# Patient Record
Sex: FEMALE | Race: WHITE | NOT HISPANIC OR LATINO | Employment: FULL TIME | URBAN - METROPOLITAN AREA
[De-identification: names, ages, dates, MRNs, and addresses within clinical notes are randomized per-mention and may not be internally consistent; named-entity substitution may affect disease eponyms.]

---

## 2017-10-30 ENCOUNTER — OFFICE VISIT (OUTPATIENT)
Dept: OBSTETRICS AND GYNECOLOGY | Age: 22
End: 2017-10-30

## 2017-10-30 VITALS
HEIGHT: 63 IN | WEIGHT: 189 LBS | BODY MASS INDEX: 33.49 KG/M2 | SYSTOLIC BLOOD PRESSURE: 122 MMHG | DIASTOLIC BLOOD PRESSURE: 72 MMHG

## 2017-10-30 DIAGNOSIS — Z01.419 WELL WOMAN EXAM WITH ROUTINE GYNECOLOGICAL EXAM: Primary | ICD-10-CM

## 2017-10-30 DIAGNOSIS — D68.51 FACTOR V LEIDEN (HCC): ICD-10-CM

## 2017-10-30 PROCEDURE — 99395 PREV VISIT EST AGE 18-39: CPT | Performed by: PHYSICIAN ASSISTANT

## 2017-10-30 RX ORDER — CETIRIZINE HYDROCHLORIDE 10 MG/1
10 TABLET ORAL DAILY
COMMUNITY

## 2017-10-30 NOTE — PROGRESS NOTES
"Subjective     Chief Complaint   Patient presents with   • Gynecologic Exam     annual exam.       History of Present Illness    Ines Padron is a 22 y.o. No obstetric history on file. who presents for annual exam.      She is doing well.  Thinking about pregnancy and has questions about what to do regarding attempting pregnancy d/t her Factor V leiden deficiency. Discussed that it doesn't change how she attempts pregnancy, but would need to be treated with an anti coagulant d/t her clotting d/o.    She is a pt of Dr Mendez. Has a sister Cinthya Garcia that is a pt of Dr Garber (just delivered her 4th child)    Her menses are regular every 28-30 days, lasting 4-7 days, dysmenorrhea none   Obstetric History:  OB History     No data available         Menstrual History:     Patient's last menstrual period was 10/02/2017 (exact date).         Current contraception: condoms  History of abnormal Pap smear: no  Received Gardasil immunization: yes - x3  Perform regular self breast exam: no  Family history of uterine or ovarian cancer: no  Family History of colon cancer: no  Family history of breast cancer: no    Mammogram: not indicated.  Colonoscopy: not indicated.  DEXA: not indicated.    Exercise: moderately active  Calcium/Vitamin D: adequate intake    The following portions of the patient's history were reviewed and updated as appropriate: allergies, current medications, past family history, past medical history, past social history, past surgical history and problem list.    Review of Systems   All other systems reviewed and are negative.      Review of Systems   Constitutional: Negative for fatigue.   Respiratory: Negative for shortness of breath.    Gastrointestinal: Negative for abdominal pain.   Genitourinary: Negative for dysuria.   Neurological: Negative for headaches.   Psychiatric/Behavioral: Negative for dysphoric mood.         Objective   Physical Exam    /72  Ht 63\" (160 cm)  Wt 189 lb (85.7 kg) "  LMP 10/02/2017 (Exact Date)  Breastfeeding? No  BMI 33.48 kg/m2    General:   alert, appears stated age and cooperative   Neck: no adenopathy and thyroid normal to palpation   Heart: regular rate and rhythm   Lungs: clear to auscultation bilaterally   Abdomen: soft and nontender   Breast: inspection negative, no nipple discharge or bleeding, no masses or nodularity palpable   Vulva: normal   Vagina: normal mucosa, normal discharge   Cervix: no lesions   Uterus: normal size, non-tender   Adnexa: normal adnexa and no mass, fullness, tenderness   Rectal: not indicated     Assessment/Plan   Ines was seen today for gynecologic exam.    Diagnoses and all orders for this visit:    Well woman exam with routine gynecological exam        All questions answered.  Breast self exam technique reviewed and patient encouraged to perform self-exam monthly.  Discussed healthy lifestyle modifications.  Recommended 30 minutes of aerobic exercise five times per week.  Discussed calcium needs to prevent osteoporosis.    Disc pap guidelines, she is utd. Done last year, wnl  Disc preconception, gave samples of PNV.  Advised to call when pregnant as she would need to be on prophylactic anti coagulant  Declines STD tesing, same partner for 3 years

## 2019-01-28 ENCOUNTER — OFFICE VISIT (OUTPATIENT)
Dept: OBSTETRICS AND GYNECOLOGY | Age: 24
End: 2019-01-28

## 2019-01-28 VITALS
DIASTOLIC BLOOD PRESSURE: 68 MMHG | BODY MASS INDEX: 32.6 KG/M2 | WEIGHT: 184 LBS | HEIGHT: 63 IN | SYSTOLIC BLOOD PRESSURE: 104 MMHG

## 2019-01-28 DIAGNOSIS — Z01.419 ENCOUNTER FOR GYNECOLOGICAL EXAMINATION: Primary | ICD-10-CM

## 2019-01-28 DIAGNOSIS — Z86.718 HISTORY OF DVT (DEEP VEIN THROMBOSIS): ICD-10-CM

## 2019-01-28 DIAGNOSIS — Z31.69 ENCOUNTER FOR PRECONCEPTION CONSULTATION: ICD-10-CM

## 2019-01-28 DIAGNOSIS — Z78.9 RUBELLA IMMUNE STATUS NOT KNOWN: ICD-10-CM

## 2019-01-28 DIAGNOSIS — D68.51 FACTOR V LEIDEN (HCC): ICD-10-CM

## 2019-01-28 PROCEDURE — 99395 PREV VISIT EST AGE 18-39: CPT | Performed by: OBSTETRICS & GYNECOLOGY

## 2019-01-28 RX ORDER — ASCORBIC ACID, CALCIUM CITRATE, IRON, VITAMIN D, DL- ALPHA- TOCOPHEROL ACETATE, THIAMINE, RIBOFLAVIN, NIACINAMIDE, PYRIDOXINE HYDROCHLORIDE, FOLIC ACID, IODINE, ZINC, COPPER, DOCUSATE SODIUM, DOCONEXENT AND ICOSAPENT
1 KIT DAILY
Qty: 30 TABLET | Refills: 12 | Status: SHIPPED | OUTPATIENT
Start: 2019-01-28 | End: 2019-06-11

## 2019-01-28 NOTE — PROGRESS NOTES
"Subjective     Chief Complaint   Patient presents with   • Gynecologic Exam     Last Annual exam 10/30/17,  Last pap 14-       History of Present Illness    Ines Padron is a 23 y.o.  who presents for annual exam.  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         Current contraception: coitus interruptus  History of abnormal Pap smear: no  Received Gardasil immunization: yes - all 3  Perform regular self breast exam: yes - occ  Family history of uterine or ovarian cancer: no  Family History of colon cancer: yes - both mat adn pat grandfathers; youngest 50's  Family history of breast cancer: no    Mammogram: not indicated.  Colonoscopy: not indicated.  DEXA: not indicated.    Exercise: moderately active  Calcium/Vitamin D: adequate intake    The following portions of the patient's history were reviewed and updated as appropriate: allergies, current medications, past family history, past medical history, past social history, past surgical history and problem list.    Review of Systems    Review of Systems   Constitutional: Negative for fatigue.   Respiratory: Negative for shortness of breath.    Gastrointestinal: Negative for abdominal pain.   Genitourinary: Negative for dysuria. neg for abnormal bleeding or pelvic pain  Neurological: Negative for headaches.   Psychiatric/Behavioral: Negative for dysphoric mood.         Objective   Physical Exam    /68   Ht 160 cm (63\")   Wt 83.5 kg (184 lb)   LMP 01/10/2019 (Exact Date)   BMI 32.59 kg/m²     General:   alert, appears stated age, cooperative and no distress   Neck: no " asymmetry, masses, or scars and thyroid normal to palpation   Heart: regular rate and rhythm, S1, S2 normal, no murmur, click, rub or gallop   Lungs: clear to auscultation bilaterally   Abdomen: soft, non-tender, without masses or organomegaly   Breast: inspection negative, no nipple discharge or bleeding, no masses or nodularity palpable and no axillary adenopathy   Vulva: normal, Bartholin's, Urethra, Corralitos's normal   Vagina: normal mucosa, normal discharge   Cervix: no lesions   Uterus: normal size, mobile, non-tender, normal shape and consistency   Adnexa: normal adnexa and no mass, fullness, tenderness   Rectal: not indicated     Assessment/Plan   Ines was seen today for gynecologic exam.    Diagnoses and all orders for this visit:    Encounter for gynecological examination  -     IGP, Rfx Aptima HPV ASCU    Factor V Leiden (CMS/HCC)  -     Ambulatory Referral to Hematology    Encounter for preconception consultation  -     Prenat w/o [id number] (CITRANATAL ASSURE) 35-1 & 300 MG tablet; Take 1 tablet by mouth Daily.    Rubella immune status not known  -     Rubella Antibody, IgG    History of DVT (deep vein thrombosis)        All questions answered.  Breast self exam technique reviewed and patient encouraged to perform self-exam monthly.  Discussed healthy lifestyle modifications.  Recommended 30 minutes of aerobic exercise five times per week.    Discussed folic acid prior to pregnancy and recommended to prevent spina bifida, pt with + history of varicella and recommended rubella titer; discussed zika warnings  Plan f/u appt with Dr. Ayoub with [facility] as he did initial testing and diagnosed with factor V leiden: discussed she will likely need prophylactic dose of lovenox but will confirm with Scott REES appt

## 2019-01-29 LAB
CONV .: NORMAL
CYTOLOGIST CVX/VAG CYTO: NORMAL
CYTOLOGY CVX/VAG DOC THIN PREP: NORMAL
DX ICD CODE: NORMAL
HIV 1 & 2 AB SER-IMP: NORMAL
OTHER STN SPEC: NORMAL
PATH REPORT.FINAL DX SPEC: NORMAL
STAT OF ADQ CVX/VAG CYTO-IMP: NORMAL

## 2019-02-11 ENCOUNTER — TELEPHONE (OUTPATIENT)
Dept: OBSTETRICS AND GYNECOLOGY | Age: 24
End: 2019-02-11

## 2019-02-11 DIAGNOSIS — N92.6 MISSED MENSES: Primary | ICD-10-CM

## 2019-02-11 NOTE — TELEPHONE ENCOUNTER
Placed order, have pt come in and advise her to call if she does not receive call with results within day

## 2019-02-11 NOTE — TELEPHONE ENCOUNTER
Pt is 1 week late for period. She took home pregnancy test and it was negative. She would like to have blood pregnancy test.

## 2019-02-12 ENCOUNTER — TELEPHONE (OUTPATIENT)
Dept: OBSTETRICS AND GYNECOLOGY | Age: 24
End: 2019-02-12

## 2019-02-12 LAB — HCG INTACT+B SERPL-ACNC: <1 MIU/ML

## 2019-02-12 NOTE — TELEPHONE ENCOUNTER
----- Message from Becky Mendez MD sent at 2/12/2019  8:14 AM EST -----  Please call Ines, her hcg is negative

## 2019-02-12 NOTE — TELEPHONE ENCOUNTER
Pt was notified that her HCG is negative. She said she should have started last week. She wants to know if she should do anything besides wait and see if her cycle starts.

## 2019-02-13 NOTE — TELEPHONE ENCOUNTER
Please call pt, advise she take urine preg test atleast weekly if she does not get normal menses and recommend she schedule f/u appt if still no menses after 1 month

## 2019-03-05 ENCOUNTER — LAB (OUTPATIENT)
Dept: LAB | Facility: HOSPITAL | Age: 24
End: 2019-03-05

## 2019-03-05 ENCOUNTER — CONSULT (OUTPATIENT)
Dept: ONCOLOGY | Facility: CLINIC | Age: 24
End: 2019-03-05

## 2019-03-05 VITALS
HEART RATE: 89 BPM | TEMPERATURE: 98.1 F | DIASTOLIC BLOOD PRESSURE: 83 MMHG | HEIGHT: 65 IN | BODY MASS INDEX: 31.5 KG/M2 | RESPIRATION RATE: 16 BRPM | OXYGEN SATURATION: 99 % | WEIGHT: 189.1 LBS | SYSTOLIC BLOOD PRESSURE: 131 MMHG

## 2019-03-05 DIAGNOSIS — D68.51 FACTOR V LEIDEN (HCC): Primary | ICD-10-CM

## 2019-03-05 LAB
BASOPHILS # BLD AUTO: 0.04 10*3/MM3 (ref 0–0.2)
BASOPHILS NFR BLD AUTO: 0.6 % (ref 0–1.5)
DEPRECATED RDW RBC AUTO: 37.1 FL (ref 37–54)
EOSINOPHIL # BLD AUTO: 0.23 10*3/MM3 (ref 0–0.4)
EOSINOPHIL NFR BLD AUTO: 3.6 % (ref 0.3–6.2)
ERYTHROCYTE [DISTWIDTH] IN BLOOD BY AUTOMATED COUNT: 11.7 % (ref 12.3–15.4)
HCT VFR BLD AUTO: 41 % (ref 34–46.6)
HGB BLD-MCNC: 14.6 G/DL (ref 12–15.9)
IMM GRANULOCYTES # BLD AUTO: 0.03 10*3/MM3 (ref 0–0.05)
IMM GRANULOCYTES NFR BLD AUTO: 0.5 % (ref 0–0.5)
LYMPHOCYTES # BLD AUTO: 1.38 10*3/MM3 (ref 0.7–3.1)
LYMPHOCYTES NFR BLD AUTO: 21.5 % (ref 19.6–45.3)
MCH RBC QN AUTO: 31.7 PG (ref 26.6–33)
MCHC RBC AUTO-ENTMCNC: 35.6 G/DL (ref 31.5–35.7)
MCV RBC AUTO: 88.9 FL (ref 79–97)
MONOCYTES # BLD AUTO: 0.56 10*3/MM3 (ref 0.1–0.9)
MONOCYTES NFR BLD AUTO: 8.7 % (ref 5–12)
NEUTROPHILS # BLD AUTO: 4.19 10*3/MM3 (ref 1.4–7)
NEUTROPHILS NFR BLD AUTO: 65.1 % (ref 42.7–76)
NRBC BLD AUTO-RTO: 0 /100 WBC (ref 0–0)
PLATELET # BLD AUTO: 267 10*3/MM3 (ref 140–450)
PMV BLD AUTO: 10.1 FL (ref 6–12)
RBC # BLD AUTO: 4.61 10*6/MM3 (ref 3.77–5.28)
WBC NRBC COR # BLD: 6.43 10*3/MM3 (ref 3.4–10.8)

## 2019-03-05 PROCEDURE — 85025 COMPLETE CBC W/AUTO DIFF WBC: CPT

## 2019-03-05 PROCEDURE — 99244 OFF/OP CNSLTJ NEW/EST MOD 40: CPT | Performed by: INTERNAL MEDICINE

## 2019-03-05 PROCEDURE — 36415 COLL VENOUS BLD VENIPUNCTURE: CPT

## 2019-03-05 NOTE — PROGRESS NOTES
REFERRING PROVIDER:    Becky Mendez MD  [address] [place] [address]    REASON FOR CONSULTATION:    1.  History of postoperative left lower extremity DVT following arthroscopic left knee surgery.  Also provoked by oral estrogen contraceptive use.  2.  She is a heterozygote for the factor V Leiden R506Q mutation    HISTORY OF PRESENT ILLNESS:  Ines Padron is a 23 y.o. female who is referred today for further evaluation of the above issues.    She has done well since she was last seen here back in 2014.  No further episodes of venous thromboembolism.  No further orthopedic procedures.    She desires pregnancy and is referred back to discuss the possibility of anticoagulation during pregnancy.      HEMATOLOGIC HISTORY:  She was initially seen back in June 2014.  At that time she was a 19-year-old female.  She was referred with a history of left lower extremity DVT following knee surgery.  She had an arthroscopic left knee procedure in May 2012 and had a subsequent surgery on 9/27/2012 by Dr. Dom Garcia with a cartilage implant in the left knee.  On 10/16/2012 she was diagnosed with a left popliteal and calf vein DVT.  This was an incompletely occlusive DVT.  She was on an estrogen containing oral contraceptive at the time.  She was started on warfarin which was discontinued as of 2/1/2013.  A venous duplex on 2/6/2013 showed an old calf vein thrombosis in the left leg.    She subsequently developed right knee pain.  She had a right knee arthroplasty with excision of a ganglion cyst and repair of the lateral meniscus on 5/12/2014.    She was initially seen here in the office on 6/2/2014 18.  She was prescribed prophylactic Xarelto at 10 mg daily.    She was found to be a heterozygote for the factor V Leiden mutation.  There was no evidence for a prothrombin gene mutation.  She stopped the Xarelto when her leg came out of the brace.           Past Medical History:   Diagnosis Date   •  Asthma     exercised induced   • DVT (deep venous thrombosis) (CMS/[facility])     post op dvt    • Factor V Leiden (CMS/[facility])    • IBS (irritable bowel syndrome)        Past Surgical History:   Procedure Laterality Date   • KNEE ARTHROSCOPY  05/2012   • KNEE SURGERY  09/2012       SOCIAL HISTORY:   reports that  has never smoked. she has never used smokeless tobacco. She reports that she drinks alcohol. Her drug history is not on file.    FAMILY HISTORY:  family history includes Colon cancer in her maternal grandfather and paternal grandfather.  No other family history of venous thromboembolism      ALLERGIES:  No Known Allergies    MEDICATIONS:  The medication list has been reviewed with the patient by the medical assistant, and the list has been updated in the electronic medical record, which I reviewed.  Medication dosages and frequencies were confirmed to be accurate.    Review of Systems   Constitutional: Negative for appetite change, chills, fatigue, fever and unexpected weight change.   HENT: Negative for congestion, dental problem, facial swelling, hearing loss, mouth sores, nosebleeds, rhinorrhea, sore throat, tinnitus, trouble swallowing and voice change.    Eyes: Negative.    Respiratory: Negative for cough, chest tightness, shortness of breath, wheezing and stridor.    Cardiovascular: Negative for chest pain, palpitations and leg swelling.   Gastrointestinal: Negative for abdominal distention, abdominal pain, blood in stool, constipation, diarrhea, nausea and vomiting.   Endocrine: Negative.    Genitourinary: Negative for difficulty urinating, dysuria, flank pain, frequency, hematuria, menstrual problem, pelvic pain, vaginal bleeding and vaginal discharge.   Musculoskeletal: Negative for arthralgias, back pain, joint swelling, myalgias, neck pain and neck stiffness.   Skin: Negative for color change, rash and wound.   Allergic/Immunologic: Negative for environmental allergies.   Neurological: Negative for  dizziness, seizures, syncope, weakness, numbness and headaches.   Hematological: Negative for adenopathy. Does not bruise/bleed easily.   Psychiatric/Behavioral: Negative for agitation, confusion and sleep disturbance. The patient is not nervous/anxious.    All other systems reviewed and are negative.      There were no vitals filed for this visit.    Physical Exam   Constitutional: She is oriented to person, place, and time. She appears well-developed and well-nourished.   HENT:   Head: Normocephalic and atraumatic.   Nose: Nose normal.   Eyes: Conjunctivae and EOM are normal. Pupils are equal, round, and reactive to light.   Neck: Neck supple.   Cardiovascular: Normal rate, regular rhythm, S1 normal, S2 normal and normal heart sounds. Exam reveals no gallop and no friction rub.   No murmur heard.  Pulmonary/Chest: Effort normal and breath sounds normal. No stridor. No respiratory distress. She has no wheezes. She has no rhonchi. She has no rales. She exhibits no tenderness.   Abdominal: Soft. Bowel sounds are normal. She exhibits no distension and no mass. There is no tenderness. There is no rebound and no guarding.   Musculoskeletal: Normal range of motion. She exhibits no edema.   Lymphadenopathy:     She has no cervical adenopathy.     She has no axillary adenopathy.        Right: No inguinal and no supraclavicular adenopathy present.        Left: No inguinal and no supraclavicular adenopathy present.   Neurological: She is alert and oriented to person, place, and time. No cranial nerve deficit or sensory deficit.   Skin: Skin is warm and dry. No rash noted. No erythema.   Psychiatric: She has a normal mood and affect. Her behavior is normal. Judgment and thought content normal.   Vitals reviewed.      DIAGNOSTIC DATA:  Results for orders placed or performed in visit on 02/11/19   HCG, B-subunit, Quantitative   Result Value Ref Range    HCG Quantitative <1 mIU/mL       IMAGING:    None  reviewed    ASSESSMENT:  This is a 23 y.o. female with:  1.  She has a history of left lower extremity DVT in the popliteal and calf vein that was provoked following a left knee arthroscopic procedure as well as concomitant oral estrogen contraceptive use.  In addition, she is a heterozygote for the factor V Leiden mutation.  We discussed the implication of this today.  We did discuss that because her prior episode of DVT was at least partially provoked by estrogen containing oral contraceptives that I believe prophylactic anticoagulation is indicated throughout pregnancy and for 6 weeks thereafter.  She does not need to be on an anticoagulant as she is trying to become pregnant, but I do recommend that once she does confirm pregnancy she should start Lovenox 40 mg once daily with consideration of increasing the dose at some point as her weight increases during pregnancy.  If her OB/GYN is comfortable prescribing this medicine then that is okay with me, and I can see her back a few months into pregnancy.  Otherwise, she can contact my office and we can prescribe the Lovenox.        RECOMMENDATIONS:   1.  Once pregnancy is confirmed, I recommend that she start Lovenox 40 mg once daily.  As pregnancy progresses, we will consider increasing the dose as her weight increases.  We can prescribe this or her OB/GYN can prescribe this.  2.  She does not need an anticoagulant as she is trying to become pregnant.  3.  I gave her some written information regarding the factor V Leiden mutation today.  I did explain that there is a 50% chance that she can pass this mutation on to any offspring.  4.  I have not scheduled a routine follow-up appointment for her at this point but I can see her back at any point if needed.  I would like to at least see her back a few months into pregnancy.

## 2019-03-06 ENCOUNTER — TELEPHONE (OUTPATIENT)
Dept: OBSTETRICS AND GYNECOLOGY | Age: 24
End: 2019-03-06

## 2019-06-10 ENCOUNTER — TELEPHONE (OUTPATIENT)
Dept: OBSTETRICS AND GYNECOLOGY | Age: 24
End: 2019-06-10

## 2019-06-11 ENCOUNTER — OFFICE VISIT (OUTPATIENT)
Dept: OBSTETRICS AND GYNECOLOGY | Age: 24
End: 2019-06-11

## 2019-06-11 VITALS
HEIGHT: 64 IN | BODY MASS INDEX: 32.78 KG/M2 | WEIGHT: 192 LBS | DIASTOLIC BLOOD PRESSURE: 70 MMHG | SYSTOLIC BLOOD PRESSURE: 120 MMHG

## 2019-06-11 DIAGNOSIS — Z01.84 IMMUNITY TO RUBELLA DETERMINED BY SEROLOGIC TEST: ICD-10-CM

## 2019-06-11 DIAGNOSIS — N92.6 IRREGULAR MENSES: Primary | ICD-10-CM

## 2019-06-11 PROCEDURE — 99213 OFFICE O/P EST LOW 20 MIN: CPT | Performed by: OBSTETRICS & GYNECOLOGY

## 2019-06-11 NOTE — PROGRESS NOTES
"Chief complaint:irregular menses    HPI  Ines Padron is a 24 y.o. female comes in for evaluation of irregular menses. She notes her cycles have been 28 days consistently. Starting in Feb, she was 5 days late, then back to normal until May then 7 days late in June. Urine HCG was negative. They are still using condoms for contraception. Weight is stable. She denies change in exercise.         The following portions of the patient's history were reviewed and updated as appropriate: allergies, current medications, past family history, past medical history, past social history, past surgical history and problem list.    Review of Systems  Constitutional: negative  Gastrointestinal: negative  Genitourinary:pos for recent irregular menses  Integument/breast: positive for acne    /70   Ht 162.6 cm (64\")   Wt 87.1 kg (192 lb)   LMP 06/10/2019 (Exact Date)   Breastfeeding? No   BMI 32.96 kg/m²         Physical Exam   Constitutional: She is oriented to person, place, and time. She appears well-developed and well-nourished.   Pulmonary/Chest: Effort normal.   Neurological: She is oriented to person, place, and time.   Psychiatric: Her behavior is normal.           Ines was seen today for gynecologic exam.    Diagnoses and all orders for this visit:    Irregular menses  -     TSH  -     HCG, B-subunit, Quantitative    Immunity to rubella determined by serologic test  -     Rubella Antibody, IgG    several late menses over the last 6 months. Recommend TSH and pelvic u/s. Will also check HCG as chemical preg possible. Pt planning pregnancy in near future so will check rubella titer. This was ordered at last visit but pt did not have lab. She agrees with this as well. Will have u/s at f/u visit          "

## 2019-06-12 LAB
HCG INTACT+B SERPL-ACNC: <0.5 MIU/ML
RUBV IGG SERPL IA-ACNC: <0.9 INDEX
TSH SERPL DL<=0.005 MIU/L-ACNC: 2.44 MIU/ML (ref 0.27–4.2)

## 2019-06-13 ENCOUNTER — TELEPHONE (OUTPATIENT)
Dept: OBSTETRICS AND GYNECOLOGY | Age: 24
End: 2019-06-13

## 2019-06-13 NOTE — TELEPHONE ENCOUNTER
----- Message from Becky Mendez MD sent at 6/12/2019 11:48 AM EDT -----  Please call Ines, her thyroid test is negative and preg test is negative. She is not immune to rubella so recommend she get MMR vaccine and she will need to delay pregnancy for over 3 months after vaccination

## 2019-06-24 ENCOUNTER — OFFICE VISIT (OUTPATIENT)
Dept: OBSTETRICS AND GYNECOLOGY | Age: 24
End: 2019-06-24

## 2019-06-24 ENCOUNTER — PROCEDURE VISIT (OUTPATIENT)
Dept: OBSTETRICS AND GYNECOLOGY | Age: 24
End: 2019-06-24

## 2019-06-24 VITALS
BODY MASS INDEX: 32.61 KG/M2 | HEIGHT: 64 IN | WEIGHT: 191 LBS | SYSTOLIC BLOOD PRESSURE: 100 MMHG | DIASTOLIC BLOOD PRESSURE: 60 MMHG

## 2019-06-24 DIAGNOSIS — R93.89 THICKENED ENDOMETRIUM: ICD-10-CM

## 2019-06-24 DIAGNOSIS — D68.51 FACTOR V LEIDEN (HCC): ICD-10-CM

## 2019-06-24 DIAGNOSIS — N92.6 IRREGULAR MENSES: Primary | ICD-10-CM

## 2019-06-24 PROCEDURE — 76830 TRANSVAGINAL US NON-OB: CPT | Performed by: OBSTETRICS & GYNECOLOGY

## 2019-06-24 PROCEDURE — 99212 OFFICE O/P EST SF 10 MIN: CPT | Performed by: OBSTETRICS & GYNECOLOGY

## 2019-06-24 NOTE — PROGRESS NOTES
"Chief complaint:irregular menses    HPI  Ines Padron is a 24 y.o. female presents for f/u visit. She has had late cycles over the past 6 months. She denies breakthrough bleeding or spotting.         The following portions of the patient's history were reviewed and updated as appropriate: allergies, current medications, past family history, past medical history, past social history, past surgical history and problem list.    Review of Systems  Pertinent items are noted in HPI.    /60   Ht 162.6 cm (64\")   Wt 86.6 kg (191 lb)   LMP 06/10/2019 (Exact Date)   Breastfeeding? No   BMI 32.79 kg/m²         Physical Exam   Constitutional: She is oriented to person, place, and time. She appears well-developed and well-nourished.   Neurological: She is alert and oriented to person, place, and time.     tv u/s: uterus with thickened endometrium at 17 mm; focal lesion noted 8 X 9 mm; ovaries with multiple follicles bilaterally        Ines was seen today for follow-up.    Diagnoses and all orders for this visit:    Irregular menses    suspect PCOS given multiple follicles and thickened endometrium. Focal lesion is suggestive of polyp but would recommend f/u u/s to evaluate further and embx due to thickened lining. Reviewed option of hysteroscopy but pt would prefer re-evaluation. She notes her menses are irregular but denies any breakthrough bleeding or spotting; she is late for menses would suggest oligoovulation. Advised pt abstain prior to u/s and biopsy and she agrees.             "

## 2019-07-19 ENCOUNTER — PROCEDURE VISIT (OUTPATIENT)
Dept: OBSTETRICS AND GYNECOLOGY | Age: 24
End: 2019-07-19

## 2019-07-19 ENCOUNTER — OFFICE VISIT (OUTPATIENT)
Dept: OBSTETRICS AND GYNECOLOGY | Age: 24
End: 2019-07-19

## 2019-07-19 VITALS
SYSTOLIC BLOOD PRESSURE: 116 MMHG | WEIGHT: 195.4 LBS | HEIGHT: 64 IN | DIASTOLIC BLOOD PRESSURE: 70 MMHG | BODY MASS INDEX: 33.36 KG/M2

## 2019-07-19 DIAGNOSIS — R93.89 THICKENED ENDOMETRIUM: Primary | ICD-10-CM

## 2019-07-19 DIAGNOSIS — N92.6 IRREGULAR MENSES: ICD-10-CM

## 2019-07-19 DIAGNOSIS — Z01.812 PRE-PROCEDURE LAB EXAM: Primary | ICD-10-CM

## 2019-07-19 LAB
B-HCG UR QL: NEGATIVE
INTERNAL NEGATIVE CONTROL: NEGATIVE
INTERNAL POSITIVE CONTROL: POSITIVE
Lab: NORMAL

## 2019-07-19 PROCEDURE — 81025 URINE PREGNANCY TEST: CPT | Performed by: OBSTETRICS & GYNECOLOGY

## 2019-07-19 PROCEDURE — 99212 OFFICE O/P EST SF 10 MIN: CPT | Performed by: OBSTETRICS & GYNECOLOGY

## 2019-07-19 PROCEDURE — 76830 TRANSVAGINAL US NON-OB: CPT | Performed by: OBSTETRICS & GYNECOLOGY

## 2019-07-19 NOTE — PROGRESS NOTES
"Chief complaint: irregular menses    HPI  Ines Padron is a 24 y.o. female presents for f/u u/s. She had a normal menses on schedule this past month.         The following portions of the patient's history were reviewed and updated as appropriate: allergies, current medications, past family history, past medical history, past social history, past surgical history and problem list.    Review of Systems  Pertinent items are noted in HPI.    /70   Ht 162.6 cm (64\")   Wt 88.6 kg (195 lb 6.4 oz)   LMP 07/08/2019 (Exact Date)   Breastfeeding? No   BMI 33.54 kg/m²         Physical Exam   Constitutional: She is oriented to person, place, and time. She appears well-developed and well-nourished.   Neurological: She is alert and oriented to person, place, and time.   Psychiatric: She has a normal mood and affect. Her behavior is normal.     tv u/s: normal uterus and endometrium now measuring 6 to 12 mm; no focal lesions noted and 3 layer appearance noted; ovaries are normal, left contains simple follicular cyst 1.8 X 1.8 cm.        Ines was seen today for procedure.    Diagnoses and all orders for this visit:    Pre-procedure lab exam  -     POC Pregnancy, Urine    Irregular menses      Current u/s with normal appearing endometrium. Will hold on embx since appearance appropriate and focal lesion not seen with u/s today and pt agrees. Pt also denies any excessive or breakthrough bleeding but reports she has a late menses a couple of times. Recent menses was regular. Pt considering pregnancy next year. Discussed ovulation tests. Suspect oligoovulation with later flow after failed ovulation. Pt understands, plan observe with f/u for annual or prn          "

## 2019-10-31 ENCOUNTER — TELEPHONE (OUTPATIENT)
Dept: OBSTETRICS AND GYNECOLOGY | Age: 24
End: 2019-10-31

## 2019-10-31 ENCOUNTER — OFFICE VISIT (OUTPATIENT)
Dept: OBSTETRICS AND GYNECOLOGY | Age: 24
End: 2019-10-31

## 2019-10-31 VITALS
BODY MASS INDEX: 34.31 KG/M2 | SYSTOLIC BLOOD PRESSURE: 132 MMHG | WEIGHT: 201 LBS | HEIGHT: 64 IN | DIASTOLIC BLOOD PRESSURE: 84 MMHG

## 2019-10-31 DIAGNOSIS — Z86.718 HISTORY OF DVT (DEEP VEIN THROMBOSIS): ICD-10-CM

## 2019-10-31 DIAGNOSIS — D68.51 FACTOR V LEIDEN (HCC): ICD-10-CM

## 2019-10-31 DIAGNOSIS — Z32.01 POSITIVE PREGNANCY TEST: ICD-10-CM

## 2019-10-31 DIAGNOSIS — N92.6 MISSED MENSES: Primary | ICD-10-CM

## 2019-10-31 LAB
B-HCG UR QL: POSITIVE
INTERNAL NEGATIVE CONTROL: NEGATIVE
INTERNAL POSITIVE CONTROL: POSITIVE
Lab: ABNORMAL

## 2019-10-31 PROCEDURE — 99213 OFFICE O/P EST LOW 20 MIN: CPT | Performed by: PHYSICIAN ASSISTANT

## 2019-10-31 PROCEDURE — 81025 URINE PREGNANCY TEST: CPT | Performed by: PHYSICIAN ASSISTANT

## 2019-10-31 NOTE — PROGRESS NOTES
"Subjective     Chief Complaint   Patient presents with   • Consult     c/o positive pregnancy test, pt has factor v       Ines Padron is a 24 y.o.  whose LMP is Patient's last menstrual period was 10/02/2019 (exact date). presents with new pregnancy  She took a test this morning that was +  This was unplanned and BF doesn't know yet  Pt has h/o DVT s/p knee surgery and was found to have factor v leiden deficiency  She was told that when she was pregnant she needs to be on blood thinner, specifically lovenox  Pt is getting ready to go [facility]  Both flights are short but pt aware of risk of DVT    She has no other issues, no pain or bleeding    Pt of Dr Mendez, new to me with this concern       No Additional Complaints Reported    The following portions of the patient's history were reviewed and updated as appropriate:vital signs, allergies, current medications, past family history, past medical history, past social history, past surgical history and problem list      Review of Systems   Genitourinary:positive for positive pregnancy test       Objective      /84   Ht 162.6 cm (64\")   Wt 91.2 kg (201 lb)   LMP 10/02/2019 (Exact Date)   Breastfeeding? No   BMI 34.50 kg/m²     Physical Exam    General:   alert, comfortable and no distress   Heart: Not performed today   Lungs: Not performed today.   Breast: Not performed today   Neck: na   Abdomen: {Not performed today   CVA: Not performed today   Pelvis: Not performed today   Extremities: Not performed today   Neurologic: negative   Psychiatric: Normal affect, judgement, and mood       Lab Review   Labs: Urine pregnancy test     Imaging   No data reviewed    Assessment/Plan     ASSESSMENT  1. Missed menses    2. Positive pregnancy test    3. Factor V Leiden (CMS/[facility])        PLAN  1.   Orders Placed This Encounter   Procedures   • Urine Culture - , Urine, Random Void   • POC Pregnancy, Urine       2. Medications prescribed this " encounter:        New Medications Ordered This Visit   Medications   • enoxaparin (LOVENOX) 40 MG/0.4ML solution syringe     Sig: Inject 0.4 mL under the skin into the appropriate area as directed Every 12 (Twelve) Hours.     Dispense:  11.2 mL     Refill:  2       3. Instructions given on how to administer lovenox. rx sent in for her.  Plan routine/new ob visit in approx 3 wks or sooner prn. Enc compression hose with flight, good hydration      Follow up: 3 week(s)    GIANNI Valdivia  10/31/2019

## 2019-11-01 ENCOUNTER — TELEPHONE (OUTPATIENT)
Dept: OBSTETRICS AND GYNECOLOGY | Age: 24
End: 2019-11-01

## 2019-11-01 NOTE — TELEPHONE ENCOUNTER
Called pt to advise pt to change lovenox to once daily dosing. Reviewed note from her visit and she was given lovenox Q 12 hours. Her hematologist recommended only once daily. Left message for pt to call back asap.

## 2019-11-02 LAB
BACTERIA UR CULT: ABNORMAL
BACTERIA UR CULT: ABNORMAL

## 2019-11-04 ENCOUNTER — TELEPHONE (OUTPATIENT)
Dept: OBSTETRICS AND GYNECOLOGY | Age: 24
End: 2019-11-04

## 2019-11-04 RX ORDER — AMOXICILLIN 500 MG/1
500 CAPSULE ORAL 2 TIMES DAILY
Qty: 14 CAPSULE | Refills: 0 | Status: SHIPPED | OUTPATIENT
Start: 2019-11-04 | End: 2019-11-11

## 2019-11-04 NOTE — TELEPHONE ENCOUNTER
----- Message from GIANNI Cui sent at 11/4/2019 10:15 AM EST -----  Let the pt know her urine is showing + for infection. I am sending in amoxicillin for the pt to take. It is safe to take in pregnancy. Also, confirm that she has been made aware that her lovenox is to be taken daily. I apologize for the confusion, I advised pt to use it once a day, but inadvertently sent in the rx with instructions to take bid (it was a pre populated instruction)

## 2019-11-05 ENCOUNTER — TELEPHONE (OUTPATIENT)
Dept: OBSTETRICS AND GYNECOLOGY | Age: 24
End: 2019-11-05

## 2019-11-07 NOTE — TELEPHONE ENCOUNTER
I sent patient a letter, asking her to cotact our office for her results.  Multiple phone calls (LMTC), no response from patient @ this time.

## 2019-11-22 ENCOUNTER — INITIAL PRENATAL (OUTPATIENT)
Dept: OBSTETRICS AND GYNECOLOGY | Age: 24
End: 2019-11-22

## 2019-11-22 ENCOUNTER — PROCEDURE VISIT (OUTPATIENT)
Dept: OBSTETRICS AND GYNECOLOGY | Age: 24
End: 2019-11-22

## 2019-11-22 VITALS — DIASTOLIC BLOOD PRESSURE: 66 MMHG | SYSTOLIC BLOOD PRESSURE: 104 MMHG | WEIGHT: 201.8 LBS | BODY MASS INDEX: 34.64 KG/M2

## 2019-11-22 DIAGNOSIS — Z13.29 THYROID DISORDER SCREEN: ICD-10-CM

## 2019-11-22 DIAGNOSIS — B95.1 GROUP B STREPTOCOCCUS URINARY TRACT INFECTION AFFECTING PREGNANCY, ANTEPARTUM: ICD-10-CM

## 2019-11-22 DIAGNOSIS — O36.80X0 ENCOUNTER TO DETERMINE FETAL VIABILITY OF PREGNANCY, SINGLE OR UNSPECIFIED FETUS: ICD-10-CM

## 2019-11-22 DIAGNOSIS — O23.40 GROUP B STREPTOCOCCUS URINARY TRACT INFECTION AFFECTING PREGNANCY, ANTEPARTUM: ICD-10-CM

## 2019-11-22 DIAGNOSIS — D68.51 FACTOR V LEIDEN (HCC): Primary | ICD-10-CM

## 2019-11-22 DIAGNOSIS — Z86.718 HISTORY OF DVT (DEEP VEIN THROMBOSIS): ICD-10-CM

## 2019-11-22 PROBLEM — R63.8 INCREASED BMI: Status: ACTIVE | Noted: 2019-11-22

## 2019-11-22 LAB
EXTERNAL CYSTIC FIBROSIS: NORMAL
EXTERNAL NIPT: NORMAL
GP B STREP RRNA SPEC QL PROBE: NORMAL
VZV IGG SER QL: NORMAL

## 2019-11-22 PROCEDURE — 0501F PRENATAL FLOW SHEET: CPT | Performed by: OBSTETRICS & GYNECOLOGY

## 2019-11-22 PROCEDURE — 76817 TRANSVAGINAL US OBSTETRIC: CPT | Performed by: OBSTETRICS & GYNECOLOGY

## 2019-11-22 RX ORDER — PRENATAL VIT NO.126/IRON/FOLIC 28MG-0.8MG
TABLET ORAL DAILY
COMMUNITY
End: 2021-05-27

## 2019-11-22 RX ORDER — AMOXICILLIN 500 MG/1
500 CAPSULE ORAL 2 TIMES DAILY
Qty: 14 CAPSULE | Refills: 0 | Status: SHIPPED | OUTPATIENT
Start: 2019-11-22 | End: 2019-11-29

## 2019-11-22 NOTE — PROGRESS NOTES
Chief complaint:early pregnancy    HPI  Ines Padron is a 24 y.o. female present for pregnancy u/s. She denies any issues today except that FOB is surprised about her pegnancy but she feels he will be supportive overall. She denies pain or bleeding. She did not take the antibiotics for UTI because she did not have symptoms. She requests medication be sent to new pharmacy.        The following portions of the patient's history were reviewed and updated as appropriate: allergies, current medications, past family history, past medical history, past social history, past surgical history and problem list.    Review of Systems  .Review of Systems - General ROS: negative for - fever  Breast ROS: positive for - tenderness  Respiratory ROS: no cough, shortness of breath, or wheezing  Cardiovascular ROS: no chest pain or dyspnea on exertion  Gastrointestinal ROS: negative for abd pain or n/v  Genito-Urinary ROS: negative for bleeding or pelvic pain  Neurological ROS: positive for one episode of numbness in left hand that resolved quickly  Dermatological ROS: negative for rash    LMP 10/02/2019 (Exact Date)         Physical Exam   General : pt in no distress  HEENT: nc/at  Neck: supple, no thyromegaly  Abd: soft, nontender  Lungs: clear bilaterally  Heart: regular rate and rhythm  Ext: no cords or tenderenss    U/s: viable IUP at 7 week size, normal ovaries          Diagnoses and all orders for this visit:    Factor V Leiden (CMS/[facility])    History of DVT (deep vein thrombosis)    Encounter to determine fetal viability of pregnancy, single or unspecified fetus  -     TSH  -     OB Panel With HIV    Group B Streptococcus urinary tract infection affecting pregnancy, antepartum    Thyroid disorder screen  -     TSH      Reviewed pnguidelines, plan exam at f/u visit. Questions answered.     Continue lovenox 40 mg SC daily, advised pt to schedule f/u with her hematologist as he planned f/u with pregnancy    rx sent for  amoxicillin 500 mg twice daily for 7 days. Counseled regarding GBS in pregnancy and need for prophylaxis at delivery    Pt declines aneuploidy screening or carrier screening    rtc 3 weeks with pelvic exam    Reviewed recommended wt gain and exercise in pregnancy

## 2019-11-23 LAB
ABO GROUP BLD: (no result)
BASOPHILS # BLD AUTO: 0 X10E3/UL (ref 0–0.2)
BASOPHILS NFR BLD AUTO: 0 %
BLD GP AB SCN SERPL QL: NEGATIVE
EOSINOPHIL # BLD AUTO: 0.1 X10E3/UL (ref 0–0.4)
EOSINOPHIL NFR BLD AUTO: 2 %
ERYTHROCYTE [DISTWIDTH] IN BLOOD BY AUTOMATED COUNT: 13.1 % (ref 12.3–15.4)
HBV SURFACE AG SERPL QL IA: NEGATIVE
HCT VFR BLD AUTO: 38.5 % (ref 34–46.6)
HCV AB S/CO SERPL IA: <0.1 S/CO RATIO (ref 0–0.9)
HGB BLD-MCNC: 13.2 G/DL (ref 11.1–15.9)
HIV 1+2 AB+HIV1 P24 AG SERPL QL IA: NON REACTIVE
IMM GRANULOCYTES # BLD AUTO: 0 X10E3/UL (ref 0–0.1)
IMM GRANULOCYTES NFR BLD AUTO: 0 %
LYMPHOCYTES # BLD AUTO: 1.1 X10E3/UL (ref 0.7–3.1)
LYMPHOCYTES NFR BLD AUTO: 17 %
MCH RBC QN AUTO: 31.9 PG (ref 26.6–33)
MCHC RBC AUTO-ENTMCNC: 34.3 G/DL (ref 31.5–35.7)
MCV RBC AUTO: 93 FL (ref 79–97)
MONOCYTES # BLD AUTO: 0.5 X10E3/UL (ref 0.1–0.9)
MONOCYTES NFR BLD AUTO: 7 %
NEUTROPHILS # BLD AUTO: 4.7 X10E3/UL (ref 1.4–7)
NEUTROPHILS NFR BLD AUTO: 74 %
PLATELET # BLD AUTO: 307 X10E3/UL (ref 150–450)
RBC # BLD AUTO: 4.14 X10E6/UL (ref 3.77–5.28)
RH BLD: POSITIVE
RPR SER QL: NON REACTIVE
RUBV IGG SERPL IA-ACNC: 2.23 INDEX
TSH SERPL DL<=0.005 MIU/L-ACNC: 2.02 UIU/ML (ref 0.27–4.2)
WBC # BLD AUTO: 6.4 X10E3/UL (ref 3.4–10.8)

## 2019-11-25 ENCOUNTER — TELEPHONE (OUTPATIENT)
Dept: OBSTETRICS AND GYNECOLOGY | Age: 24
End: 2019-11-25

## 2019-11-25 NOTE — TELEPHONE ENCOUNTER
----- Message from Becky Mendez MD sent at 11/23/2019  3:16 PM EST -----  Call Ines, her routine prenatal blood tests are normal.

## 2019-12-13 ENCOUNTER — APPOINTMENT (OUTPATIENT)
Dept: OTHER | Facility: HOSPITAL | Age: 24
End: 2019-12-13

## 2019-12-13 ENCOUNTER — ROUTINE PRENATAL (OUTPATIENT)
Dept: OBSTETRICS AND GYNECOLOGY | Age: 24
End: 2019-12-13

## 2019-12-13 ENCOUNTER — OFFICE VISIT (OUTPATIENT)
Dept: ONCOLOGY | Facility: CLINIC | Age: 24
End: 2019-12-13

## 2019-12-13 VITALS — WEIGHT: 200 LBS | DIASTOLIC BLOOD PRESSURE: 62 MMHG | SYSTOLIC BLOOD PRESSURE: 120 MMHG | BODY MASS INDEX: 34.33 KG/M2

## 2019-12-13 VITALS
TEMPERATURE: 97 F | SYSTOLIC BLOOD PRESSURE: 124 MMHG | DIASTOLIC BLOOD PRESSURE: 82 MMHG | HEART RATE: 87 BPM | BODY MASS INDEX: 33.82 KG/M2 | RESPIRATION RATE: 16 BRPM | HEIGHT: 65 IN | OXYGEN SATURATION: 100 % | WEIGHT: 203 LBS

## 2019-12-13 DIAGNOSIS — Z11.3 SCREEN FOR STD (SEXUALLY TRANSMITTED DISEASE): ICD-10-CM

## 2019-12-13 DIAGNOSIS — D68.51 FACTOR V LEIDEN (HCC): Primary | ICD-10-CM

## 2019-12-13 DIAGNOSIS — Z86.718 HISTORY OF DVT (DEEP VEIN THROMBOSIS): ICD-10-CM

## 2019-12-13 DIAGNOSIS — D68.51 FACTOR V LEIDEN (HCC): ICD-10-CM

## 2019-12-13 DIAGNOSIS — Z3A.10 10 WEEKS GESTATION OF PREGNANCY: Primary | ICD-10-CM

## 2019-12-13 LAB
BASOPHILS # BLD AUTO: 0.03 10*3/MM3 (ref 0–0.2)
BASOPHILS NFR BLD AUTO: 0.4 % (ref 0–1.5)
CLARITY, POC: CLEAR
COLOR UR: YELLOW
DEPRECATED RDW RBC AUTO: 37.4 FL (ref 37–54)
EOSINOPHIL # BLD AUTO: 0.11 10*3/MM3 (ref 0–0.4)
EOSINOPHIL NFR BLD AUTO: 1.3 % (ref 0.3–6.2)
ERYTHROCYTE [DISTWIDTH] IN BLOOD BY AUTOMATED COUNT: 11.9 % (ref 12.3–15.4)
GLUCOSE UR STRIP-MCNC: NEGATIVE MG/DL
HCT VFR BLD AUTO: 40.2 % (ref 34–46.6)
HGB BLD-MCNC: 14.3 G/DL (ref 12–15.9)
IMM GRANULOCYTES # BLD AUTO: 0.07 10*3/MM3 (ref 0–0.05)
IMM GRANULOCYTES NFR BLD AUTO: 0.8 % (ref 0–0.5)
LYMPHOCYTES # BLD AUTO: 1.45 10*3/MM3 (ref 0.7–3.1)
LYMPHOCYTES NFR BLD AUTO: 17.5 % (ref 19.6–45.3)
MCH RBC QN AUTO: 31.4 PG (ref 26.6–33)
MCHC RBC AUTO-ENTMCNC: 35.6 G/DL (ref 31.5–35.7)
MCV RBC AUTO: 88.2 FL (ref 79–97)
MONOCYTES # BLD AUTO: 0.57 10*3/MM3 (ref 0.1–0.9)
MONOCYTES NFR BLD AUTO: 6.9 % (ref 5–12)
NEUTROPHILS # BLD AUTO: 6.07 10*3/MM3 (ref 1.7–7)
NEUTROPHILS NFR BLD AUTO: 73.1 % (ref 42.7–76)
NRBC BLD AUTO-RTO: 0 /100 WBC (ref 0–0.2)
PLATELET # BLD AUTO: 282 10*3/MM3 (ref 140–450)
PMV BLD AUTO: 10.4 FL (ref 6–12)
PROT UR STRIP-MCNC: NEGATIVE MG/DL
RBC # BLD AUTO: 4.56 10*6/MM3 (ref 3.77–5.28)
WBC NRBC COR # BLD: 8.3 10*3/MM3 (ref 3.4–10.8)

## 2019-12-13 PROCEDURE — 85025 COMPLETE CBC W/AUTO DIFF WBC: CPT | Performed by: INTERNAL MEDICINE

## 2019-12-13 PROCEDURE — 36415 COLL VENOUS BLD VENIPUNCTURE: CPT

## 2019-12-13 PROCEDURE — 81002 URINALYSIS NONAUTO W/O SCOPE: CPT | Performed by: OBSTETRICS & GYNECOLOGY

## 2019-12-13 PROCEDURE — 99214 OFFICE O/P EST MOD 30 MIN: CPT | Performed by: INTERNAL MEDICINE

## 2019-12-13 PROCEDURE — 0502F SUBSEQUENT PRENATAL CARE: CPT | Performed by: OBSTETRICS & GYNECOLOGY

## 2019-12-13 NOTE — PROGRESS NOTES
[facility] GROUP OUTPATIENT FOLLOW UP CLINIC VISIT    REASON FOR FOLLOW-UP:    1.  History of postoperative left lower extremity DVT following arthroscopic left knee surgery.  Also provoked by oral estrogen contraceptive use.  2.  She is a heterozygote for the factor V Leiden [id number] mutation  3.  Intrauterine pregnancy currently at 10 weeks gestation, estimated due date 7/8/2020    HISTORY OF PRESENT ILLNESS:  Ines Padron is a 24 y.o. female who returns today for follow up of the above issue.      She is currently about 10 weeks pregnant.  She has nausea but otherwise is doing well.  She has been started on Lovenox 40 mg once daily by her OB/GYN.  She denies any bleeding and is otherwise doing well.        HEMATOLOGIC HISTORY:  She was initially seen back in June 2014.  At that time she was a 19-year-old female.  She was referred with a history of left lower extremity DVT following knee surgery.  She had an arthroscopic left knee procedure in May 2012 and had a subsequent surgery on 9/27/2012 by Dr. Dom Garcia with a cartilage implant in the left knee.  On 10/16/2012 she was diagnosed with a left popliteal and calf vein DVT.  This was an incompletely occlusive DVT.  She was on an estrogen containing oral contraceptive at the time.  She was started on warfarin which was discontinued as of 2/1/2013.  A venous duplex on 2/6/2013 showed an old calf vein thrombosis in the left leg.     She subsequently developed right knee pain.  She had a right knee arthroplasty with excision of a ganglion cyst and repair of the lateral meniscus on 5/12/2014.     She was initially seen here in the office on 6/2/2014 18.  She was prescribed prophylactic Xarelto at 10 mg daily.     She was found to be a heterozygote for the factor V Leiden mutation.  There was no evidence for a prothrombin gene mutation.  She stopped the Xarelto when her leg came out of the brace.    ALLERGIES:  No Known Allergies    MEDICATIONS:  The  "medication list has been reviewed with the patient by the medical assistant, and the list has been updated in the electronic medical record, which I reviewed.  Medication dosages and frequencies were confirmed to be accurate.    REVIEW OF SYSTEMS:  PAIN:  See Vital Signs below.  GENERAL:  No fevers, chills, night sweats, or unintended weight loss.  SKIN:  No rashes or non-healing lesions.  HEME/LYMPH:  No abnormal bleeding.  No palpable lymphadenopathy.  EYES:  No vision changes or diplopia.  ENT:  No sore throat or difficulty swallowing.  RESPIRATORY:  No cough, shortness of breath, hemoptysis, or wheezing.  CARDIOVASCULAR:  No chest pain, palpitations, orthopnea, or dyspnea on exertion.  GASTROINTESTINAL:    Nausea.  GENITOURINARY:  No dysuria or hematuria.  MUSCULOSKELETAL:  No joint pain, swelling, or erythema.  NEUROLOGIC:  No dizziness, loss of consciousness, or seizures.  PSYCHIATRIC:  No depression, anxiety, or mood changes.    Vitals:    12/13/19 1228   BP: 124/82   Pulse: 87   Resp: 16   Temp: 97 °F (36.1 °C)   TempSrc: Oral   SpO2: 100%   Weight: 92.1 kg (203 lb)   Height: 164 cm (64.57\")   PainSc: 0-No pain  Comment: factor V leiden       PHYSICAL EXAMINATION:  GENERAL:  Well-developed well-nourished female; awake, alert and oriented, in no acute distress.  SKIN:  Warm and dry, without rashes, purpura, or petechiae.  HEAD:  Normocephalic, atraumatic.  EARS:  Hearing intact.  NOSE:  Septum midline.  No excoriations or nasal discharge.  MOUTH:  No stomatitis or ulcers.  Lips are normal.  THROAT:  Oropharynx without lesions or exudates.  LYMPHATICS:  No cervical, supraclavicular, or axillary lymphadenopathy.  CHEST:  Lungs are clear to auscultation bilaterally.  No wheezes, rales, or rhonchi.  HEART:  Regular rate; normal rhythm.  No murmurs, gallops or rubs.  ABDOMEN:  Soft, non-tender, non-distended.  Normal active bowel sounds.  No organomegaly.  EXTREMITIES:  No clubbing, cyanosis, or " edema.  NEUROLOGICAL:  No focal neurologic deficits.    DIAGNOSTIC DATA:  Results for orders placed or performed in visit on 12/13/19   CBC Auto Differential   Result Value Ref Range    WBC 8.30 3.40 - 10.80 10*3/mm3    RBC 4.56 3.77 - 5.28 10*6/mm3    Hemoglobin 14.3 12.0 - 15.9 g/dL    Hematocrit 40.2 34.0 - 46.6 %    MCV 88.2 79.0 - 97.0 fL    MCH 31.4 26.6 - 33.0 pg    MCHC 35.6 31.5 - 35.7 g/dL    RDW 11.9 (L) 12.3 - 15.4 %    RDW-SD 37.4 37.0 - 54.0 fl    MPV 10.4 6.0 - 12.0 fL    Platelets 282 140 - 450 10*3/mm3    Neutrophil % 73.1 42.7 - 76.0 %    Lymphocyte % 17.5 (L) 19.6 - 45.3 %    Monocyte % 6.9 5.0 - 12.0 %    Eosinophil % 1.3 0.3 - 6.2 %    Basophil % 0.4 0.0 - 1.5 %    Immature Grans % 0.8 (H) 0.0 - 0.5 %    Neutrophils, Absolute 6.07 1.70 - 7.00 10*3/mm3    Lymphocytes, Absolute 1.45 0.70 - 3.10 10*3/mm3    Monocytes, Absolute 0.57 0.10 - 0.90 10*3/mm3    Eosinophils, Absolute 0.11 0.00 - 0.40 10*3/mm3    Basophils, Absolute 0.03 0.00 - 0.20 10*3/mm3    Immature Grans, Absolute 0.07 (H) 0.00 - 0.05 10*3/mm3    nRBC 0.0 0.0 - 0.2 /100 WBC       IMAGING:  None reviewed    ASSESSMENT:  This is a 24 y.o. female with:  1. She has a history of left lower extremity DVT in the popliteal and calf vein that was provoked following a left knee arthroscopic procedure as well as concomitant oral estrogen contraceptive use.  In addition, she is a heterozygote for the factor V Leiden mutation.    2.  Intrauterine pregnancy, currently at 10 weeks gestation, estimated due date 7/8/2020.    PLAN:  1.  I agree with prophylactic anticoagulation.  She is currently on Lovenox 40 mg once daily.  I think this is a good dose for her for now.  If her weight goes up during the pregnancy we will consider increasing the dose to more of an intermediate dose at 40 mg twice daily.  2.  I will see her back in the office in 10 to 12 weeks for follow-up.

## 2019-12-13 NOTE — PROGRESS NOTES
Pt presents for routine f/u. She has mild nausea but no other issues. She is on lovenox daily and has appt with Scott REES today.     Reviewed GBS+, will need PCN in labor    Pt declines aneuploidy/carrier screen; f/u 4 weeks    Recommended flu shot and pt declines

## 2019-12-17 LAB
C TRACH RRNA CVX QL NAA+PROBE: NEGATIVE
CONV .: NORMAL
CYTOLOGIST CVX/VAG CYTO: NORMAL
CYTOLOGY CVX/VAG DOC CYTO: NORMAL
CYTOLOGY CVX/VAG DOC THIN PREP: NORMAL
DX ICD CODE: NORMAL
HIV 1 & 2 AB SER-IMP: NORMAL
N GONORRHOEA RRNA CVX QL NAA+PROBE: NEGATIVE
OTHER STN SPEC: NORMAL
STAT OF ADQ CVX/VAG CYTO-IMP: NORMAL

## 2019-12-18 ENCOUNTER — TELEPHONE (OUTPATIENT)
Dept: OBSTETRICS AND GYNECOLOGY | Age: 24
End: 2019-12-18

## 2019-12-18 NOTE — TELEPHONE ENCOUNTER
----- Message from Becky Mendez MD sent at 12/17/2019  6:28 PM EST -----  Call Ines, her pap and cervical tests are negative/normal

## 2020-01-07 ENCOUNTER — APPOINTMENT (OUTPATIENT)
Dept: GENERAL RADIOLOGY | Facility: HOSPITAL | Age: 25
End: 2020-01-07

## 2020-01-07 ENCOUNTER — HOSPITAL ENCOUNTER (EMERGENCY)
Facility: HOSPITAL | Age: 25
Discharge: HOME | End: 2020-01-07
Attending: EMERGENCY MEDICINE | Admitting: EMERGENCY MEDICINE

## 2020-01-07 ENCOUNTER — TELEPHONE (OUTPATIENT)
Dept: OBSTETRICS AND GYNECOLOGY | Age: 25
End: 2020-01-07

## 2020-01-07 VITALS
HEIGHT: 63 IN | BODY MASS INDEX: 35.44 KG/M2 | RESPIRATION RATE: 20 BRPM | HEART RATE: 88 BPM | SYSTOLIC BLOOD PRESSURE: 130 MMHG | TEMPERATURE: 97.1 F | OXYGEN SATURATION: 100 % | DIASTOLIC BLOOD PRESSURE: 70 MMHG | WEIGHT: 200 LBS

## 2020-01-07 DIAGNOSIS — R07.89 CHEST WALL PAIN: Primary | ICD-10-CM

## 2020-01-07 LAB
ALBUMIN SERPL-MCNC: 3.9 G/DL (ref 3.5–5.2)
ALBUMIN/GLOB SERPL: 1.1 G/DL
ALP SERPL-CCNC: 43 U/L (ref 39–117)
ALT SERPL W P-5'-P-CCNC: 20 U/L (ref 1–33)
ANION GAP SERPL CALCULATED.3IONS-SCNC: 11.6 MMOL/L (ref 5–15)
AST SERPL-CCNC: 13 U/L (ref 1–32)
BASOPHILS # BLD AUTO: 0.04 10*3/MM3 (ref 0–0.2)
BASOPHILS NFR BLD AUTO: 0.4 % (ref 0–1.5)
BILIRUB SERPL-MCNC: <0.2 MG/DL (ref 0.2–1.2)
BUN BLD-MCNC: 7 MG/DL (ref 6–20)
BUN/CREAT SERPL: 11.5 (ref 7–25)
CALCIUM SPEC-SCNC: 9.4 MG/DL (ref 8.6–10.5)
CHLORIDE SERPL-SCNC: 100 MMOL/L (ref 98–107)
CO2 SERPL-SCNC: 24.4 MMOL/L (ref 22–29)
CREAT BLD-MCNC: 0.61 MG/DL (ref 0.57–1)
D DIMER PPP FEU-MCNC: 0.7 MCGFEU/ML (ref 0–0.49)
DEPRECATED RDW RBC AUTO: 40.1 FL (ref 37–54)
EOSINOPHIL # BLD AUTO: 0.19 10*3/MM3 (ref 0–0.4)
EOSINOPHIL NFR BLD AUTO: 2.1 % (ref 0.3–6.2)
ERYTHROCYTE [DISTWIDTH] IN BLOOD BY AUTOMATED COUNT: 12.5 % (ref 12.3–15.4)
GFR SERPL CREATININE-BSD FRML MDRD: 119 ML/MIN/1.73
GLOBULIN UR ELPH-MCNC: 3.6 GM/DL
GLUCOSE BLD-MCNC: 91 MG/DL (ref 65–99)
HCT VFR BLD AUTO: 37.1 % (ref 34–46.6)
HGB BLD-MCNC: 13 G/DL (ref 12–15.9)
HOLD SPECIMEN: NORMAL
HOLD SPECIMEN: NORMAL
IMM GRANULOCYTES # BLD AUTO: 0.05 10*3/MM3 (ref 0–0.05)
IMM GRANULOCYTES NFR BLD AUTO: 0.5 % (ref 0–0.5)
LYMPHOCYTES # BLD AUTO: 1.75 10*3/MM3 (ref 0.7–3.1)
LYMPHOCYTES NFR BLD AUTO: 19.2 % (ref 19.6–45.3)
MCH RBC QN AUTO: 31.6 PG (ref 26.6–33)
MCHC RBC AUTO-ENTMCNC: 35 G/DL (ref 31.5–35.7)
MCV RBC AUTO: 90 FL (ref 79–97)
MONOCYTES # BLD AUTO: 0.77 10*3/MM3 (ref 0.1–0.9)
MONOCYTES NFR BLD AUTO: 8.5 % (ref 5–12)
NEUTROPHILS # BLD AUTO: 6.3 10*3/MM3 (ref 1.7–7)
NEUTROPHILS NFR BLD AUTO: 69.3 % (ref 42.7–76)
NRBC BLD AUTO-RTO: 0 /100 WBC (ref 0–0.2)
PLATELET # BLD AUTO: 279 10*3/MM3 (ref 140–450)
PMV BLD AUTO: 10 FL (ref 6–12)
POTASSIUM BLD-SCNC: 3.5 MMOL/L (ref 3.5–5.2)
PROT SERPL-MCNC: 7.5 G/DL (ref 6–8.5)
RBC # BLD AUTO: 4.12 10*6/MM3 (ref 3.77–5.28)
SODIUM BLD-SCNC: 136 MMOL/L (ref 136–145)
WBC NRBC COR # BLD: 9.1 10*3/MM3 (ref 3.4–10.8)
WHOLE BLOOD HOLD SPECIMEN: NORMAL
WHOLE BLOOD HOLD SPECIMEN: NORMAL

## 2020-01-07 PROCEDURE — 93005 ELECTROCARDIOGRAM TRACING: CPT

## 2020-01-07 PROCEDURE — 80053 COMPREHEN METABOLIC PANEL: CPT | Performed by: EMERGENCY MEDICINE

## 2020-01-07 PROCEDURE — 85025 COMPLETE CBC W/AUTO DIFF WBC: CPT

## 2020-01-07 PROCEDURE — 93005 ELECTROCARDIOGRAM TRACING: CPT | Performed by: EMERGENCY MEDICINE

## 2020-01-07 PROCEDURE — 85379 FIBRIN DEGRADATION QUANT: CPT

## 2020-01-07 PROCEDURE — 71046 X-RAY EXAM CHEST 2 VIEWS: CPT

## 2020-01-07 PROCEDURE — 93010 ELECTROCARDIOGRAM REPORT: CPT | Performed by: INTERNAL MEDICINE

## 2020-01-07 PROCEDURE — 99283 EMERGENCY DEPT VISIT LOW MDM: CPT

## 2020-01-07 NOTE — TELEPHONE ENCOUNTER
The patient is 13 weeks 6 days pregnant.  She complains of some left-sided chest pain.  She reports that she did have some shoulder discomfort after sleeping.  At this discomfort has resolved but she now feels like the pain is moved into her chest and she has pain with a deep breath.  No fever chills or cough.  Patient does have factor V Leiden and is on Lovenox 40 mg subcu daily.  She has a history of left lower extremity DVT after a arthroscopic knee surgery.    Patient will go to the emergency room at [facility].

## 2020-01-08 ENCOUNTER — ROUTINE PRENATAL (OUTPATIENT)
Dept: OBSTETRICS AND GYNECOLOGY | Age: 25
End: 2020-01-08

## 2020-01-08 ENCOUNTER — TELEPHONE (OUTPATIENT)
Dept: OBSTETRICS AND GYNECOLOGY | Age: 25
End: 2020-01-08

## 2020-01-08 ENCOUNTER — PROCEDURE VISIT (OUTPATIENT)
Dept: OBSTETRICS AND GYNECOLOGY | Age: 25
End: 2020-01-08

## 2020-01-08 VITALS — SYSTOLIC BLOOD PRESSURE: 112 MMHG | BODY MASS INDEX: 36.31 KG/M2 | WEIGHT: 205 LBS | DIASTOLIC BLOOD PRESSURE: 66 MMHG

## 2020-01-08 DIAGNOSIS — D68.51 FACTOR V LEIDEN (HCC): Primary | ICD-10-CM

## 2020-01-08 DIAGNOSIS — Z3A.14 14 WEEKS GESTATION OF PREGNANCY: Primary | ICD-10-CM

## 2020-01-08 DIAGNOSIS — M54.9 BACK PAIN AFFECTING PREGNANCY, ANTEPARTUM: ICD-10-CM

## 2020-01-08 DIAGNOSIS — O99.891 BACK PAIN AFFECTING PREGNANCY, ANTEPARTUM: ICD-10-CM

## 2020-01-08 DIAGNOSIS — D68.51 FACTOR V LEIDEN (HCC): ICD-10-CM

## 2020-01-08 DIAGNOSIS — M54.9 BACK PAIN IN PREGNANCY: ICD-10-CM

## 2020-01-08 DIAGNOSIS — O99.891 BACK PAIN IN PREGNANCY: ICD-10-CM

## 2020-01-08 DIAGNOSIS — Z13.89 SCREENING FOR BLOOD OR PROTEIN IN URINE: ICD-10-CM

## 2020-01-08 DIAGNOSIS — Z86.718 HISTORY OF DVT (DEEP VEIN THROMBOSIS): ICD-10-CM

## 2020-01-08 LAB
BILIRUB BLD-MCNC: NEGATIVE MG/DL
CLARITY, POC: CLEAR
COLOR UR: YELLOW
GLUCOSE UR STRIP-MCNC: NEGATIVE MG/DL
KETONES UR QL: NEGATIVE
LEUKOCYTE EST, POC: NEGATIVE
NITRITE UR-MCNC: NEGATIVE MG/ML
PH UR: 6.5 [PH] (ref 5–8)
PROT UR STRIP-MCNC: NEGATIVE MG/DL
RBC # UR STRIP: NEGATIVE /UL
SP GR UR: 1.02 (ref 1–1.03)
UROBILINOGEN UR QL: NORMAL

## 2020-01-08 PROCEDURE — 76801 OB US < 14 WKS SINGLE FETUS: CPT | Performed by: OBSTETRICS & GYNECOLOGY

## 2020-01-08 PROCEDURE — 0502F SUBSEQUENT PRENATAL CARE: CPT | Performed by: PHYSICIAN ASSISTANT

## 2020-01-08 PROCEDURE — 81003 URINALYSIS AUTO W/O SCOPE: CPT | Performed by: PHYSICIAN ASSISTANT

## 2020-01-08 PROCEDURE — 76817 TRANSVAGINAL US OBSTETRIC: CPT | Performed by: OBSTETRICS & GYNECOLOGY

## 2020-01-08 NOTE — PROGRESS NOTES
Chief Complaint   Patient presents with   • Pregnancy Problem     c/o back pain       HPI: 24 y.o.  at 14w0d gestation  She was seen in ER last night with CP  Xray was wnl and blood clot/PE was r/o  She has factor v leiden deficiency. On lovenox  She called with back pain today and we scheduled her for a CL  CL is 4.01 cm  Pain is actually in scapula region  Suspect musculoskeletal and recommended tylenol 2 po q4-6 hours and massage/manipulation  Pregnancy is otherwise normal and pt and pt's Mom are both reassured    Vitals:    20 1517   BP: 112/66   Weight: 93 kg (205 lb)       ROS:  GI:  Negative  : NA  Pulmonary: Negative     A/P  1. Intrauterine pregnancy at 14w0d   2. Pregnancy Risk:  COMPLICATED    Ines was seen today for pregnancy problem.    Diagnoses and all orders for this visit:    14 weeks gestation of pregnancy  -     POC Urinalysis Dipstick, Multipro    Screening for blood or protein in urine  -     POC Urinalysis Dipstick, Multipro    Back pain in pregnancy    Factor V Leiden (CMS/[facility])      Will plan to keep f/u appt in 2 days with dr Mendez  -----------------------  PLAN:   Return in about 2 days (around 1/10/2020) for as scheduled with Dr Mendez.      GIANNI Valdivia  2020 3:28 PM

## 2020-01-08 NOTE — ED PROVIDER NOTES
"  EMERGENCY DEPARTMENT ENCOUNTER    CHIEF COMPLAINT  Chief Complaint: chest pain  History given by:patient  History limited by:none  Time Seen:   Room Number:   PMD: Dom Moreira MD  Hematologist: Dr. Ayoub  OB/GYN: Dr. Mendez    HPI:  Pt is a 24 y.o. female with a Hx of Factor V Leiden, who is 14 weeks pregnant ([id number]), who presents with progressively worsening L sided CP that began around 1530 today. The pain is described as \"sharp\" and will radiate directly to her back. She states that when she is laying down the pain is mostly in her back, but upon sitting up it will radiate more the to front of her chest. It is worsened with deep inspiration and with cough. Denies acute SOA. Patient also complains of nausea, but reports that this has been chronic for her due to her pregnancy. Pt confirms a Hx of DVT in her LLE and confirms that she is currently on Lovenox. Denies vomiting. Denies leg swelling and new urinary Sx. Denies fever, chills, and cough.   Past Medical History of DVT, asthma, and IBS.     Duration: about 5 hours  Timing:constant  Location:L sided chest  Radiation:to the back  Quality:\"sharp\"  Intensity/Severity:moderate  Progression:worsening  Associated Symptoms:chronic nausea  Aggravating Factors:movement and cough  Alleviating Factors:none  Previous Episodes:Hx of DVT  Treatment before arrival:none    PAST MEDICAL HISTORY  Active Ambulatory Problems     Diagnosis Date Noted   • Factor V Leiden (CMS/HCC) 10/30/2017   • History of DVT (deep vein thrombosis) 2019   • Group B Streptococcus urinary tract infection affecting pregnancy, antepartum 2019   • Increased BMI 2019     Resolved Ambulatory Problems     Diagnosis Date Noted   • No Resolved Ambulatory Problems     Past Medical History:   Diagnosis Date   • Asthma    • DVT (deep venous thrombosis) (CMS/HCC)    • IBS (irritable bowel syndrome)        PAST SURGICAL HISTORY  Past Surgical History:   Procedure Laterality " "Date   • KNEE ARTHROSCOPY  05/2012   • KNEE SURGERY  09/2012       FAMILY HISTORY  Family History   Problem Relation Age of Onset   • Colon cancer Paternal Grandfather    • Colon cancer Maternal Grandfather    • Hypertension Mother    • Hypertension Father    • Diabetes Father    • Breast cancer Neg Hx    • Ovarian cancer Neg Hx    • Uterine cancer Neg Hx        SOCIAL HISTORY  Social History     Socioeconomic History   • Marital status: Single     Spouse name: Not on file   • Number of children: 0   • Years of education: College   • Highest education level: Not on file   Occupational History   • Occupation:      Comment: [employer]   Tobacco Use   • Smoking status: Never Smoker   • Smokeless tobacco: Never Used   Substance and Sexual Activity   • Alcohol use: Not Currently     Comment: occasional   • Drug use: No   • Sexual activity: Yes     Partners: Male     Birth control/protection: Coitus interruptus         ALLERGIES  Patient has no known allergies.    REVIEW OF SYSTEMS  Review of Systems   Constitutional: Negative for chills and fever.   HENT: Negative for sore throat.    Respiratory: Negative for shortness of breath.    Cardiovascular: Positive for chest pain (L sided, \"sharp\").   Gastrointestinal: Positive for nausea (chronic). Negative for vomiting.   Genitourinary: Negative for dysuria.   Musculoskeletal: Negative for back pain.   Skin: Negative for rash.   Neurological: Negative for dizziness.   Psychiatric/Behavioral: The patient is not nervous/anxious.        PHYSICAL EXAM  ED Triage Vitals   Temp Heart Rate Resp BP SpO2   01/07/20 1909 01/07/20 1909 01/07/20 1909 01/07/20 1941 01/07/20 1909   97.1 °F (36.2 °C) 112 18 144/96 100 %       Physical Exam   Constitutional: She is well-developed, well-nourished, and in no distress.   HENT:   Head: Normocephalic.   Mouth/Throat: Mucous membranes are normal.   Eyes: No scleral icterus.   Neck: Normal range of motion.   Cardiovascular: " Normal rate, regular rhythm and normal heart sounds.   Pulmonary/Chest: Effort normal and breath sounds normal.   Mild reproducible pain under her L breast.    Musculoskeletal: Normal range of motion.   No calf pain or swelling.    Neurological: She is alert.   Skin: Skin is warm and dry.   Psychiatric: Mood and affect normal.   Nursing note and vitals reviewed.      LAB RESULTS  Recent Results (from the past 24 hour(s))   Comprehensive Metabolic Panel    Collection Time: 01/07/20  7:47 PM   Result Value Ref Range    Glucose 91 65 - 99 mg/dL    BUN 7 6 - 20 mg/dL    Creatinine 0.61 0.57 - 1.00 mg/dL    Sodium 136 136 - 145 mmol/L    Potassium 3.5 3.5 - 5.2 mmol/L    Chloride 100 98 - 107 mmol/L    CO2 24.4 22.0 - 29.0 mmol/L    Calcium 9.4 8.6 - 10.5 mg/dL    Total Protein 7.5 6.0 - 8.5 g/dL    Albumin 3.90 3.50 - 5.20 g/dL    ALT (SGPT) 20 1 - 33 U/L    AST (SGOT) 13 1 - 32 U/L    Alkaline Phosphatase 43 39 - 117 U/L    Total Bilirubin <0.2 (L) 0.2 - 1.2 mg/dL    eGFR Non African Amer 119 >60 mL/min/1.73    Globulin 3.6 gm/dL    A/G Ratio 1.1 g/dL    BUN/Creatinine Ratio 11.5 7.0 - 25.0    Anion Gap 11.6 5.0 - 15.0 mmol/L   D-dimer, Quantitative    Collection Time: 01/07/20  7:47 PM   Result Value Ref Range    D-Dimer, Quantitative 0.70 (H) 0.00 - 0.49 MCGFEU/mL   CBC Auto Differential    Collection Time: 01/07/20  7:47 PM   Result Value Ref Range    WBC 9.10 3.40 - 10.80 10*3/mm3    RBC 4.12 3.77 - 5.28 10*6/mm3    Hemoglobin 13.0 12.0 - 15.9 g/dL    Hematocrit 37.1 34.0 - 46.6 %    MCV 90.0 79.0 - 97.0 fL    MCH 31.6 26.6 - 33.0 pg    MCHC 35.0 31.5 - 35.7 g/dL    RDW 12.5 12.3 - 15.4 %    RDW-SD 40.1 37.0 - 54.0 fl    MPV 10.0 6.0 - 12.0 fL    Platelets 279 140 - 450 10*3/mm3    Neutrophil % 69.3 42.7 - 76.0 %    Lymphocyte % 19.2 (L) 19.6 - 45.3 %    Monocyte % 8.5 5.0 - 12.0 %    Eosinophil % 2.1 0.3 - 6.2 %    Basophil % 0.4 0.0 - 1.5 %    Immature Grans % 0.5 0.0 - 0.5 %    Neutrophils, Absolute 6.30 1.70 -  7.00 10*3/mm3    Lymphocytes, Absolute 1.75 0.70 - 3.10 10*3/mm3    Monocytes, Absolute 0.77 0.10 - 0.90 10*3/mm3    Eosinophils, Absolute 0.19 0.00 - 0.40 10*3/mm3    Basophils, Absolute 0.04 0.00 - 0.20 10*3/mm3    Immature Grans, Absolute 0.05 0.00 - 0.05 10*3/mm3    nRBC 0.0 0.0 - 0.2 /100 WBC   Light Blue Top    Collection Time: 01/07/20  7:47 PM   Result Value Ref Range    Extra Tube hold for add-on    Green Top (Gel)    Collection Time: 01/07/20  7:47 PM   Result Value Ref Range    Extra Tube Hold for add-ons.    Lavender Top    Collection Time: 01/07/20  7:47 PM   Result Value Ref Range    Extra Tube hold for add-on    Gold Top - SST    Collection Time: 01/07/20  7:47 PM   Result Value Ref Range    Extra Tube Hold for add-ons.        I ordered the above labs and reviewed the results    RADIOLOGY  Xr Chest 2 View    Result Date: 1/7/2020  TWO-VIEW CHEST  HISTORY: Chest pain.  FINDINGS: The lungs are well-expanded and clear and the heart and hilar structures are normal. There is no acute disease.  This report was finalized on 1/7/2020 8:52 PM by Dr. Randell Michaud M.D.        I ordered the above noted radiological studies. Reviewed by me. See dictation for official radiology interpretation.      EKG    ekg was interpreted by Dr. Irving, see Dr. Irving's note for interpretation.      MEDICAL RECORD REVIEW  Pt currently sees Dr. Ayoub for her Hx of Factor V Leiden. Dr. Mendez is the pt's OB/GYN.         PROGRESS AND CONSULTS     2112- Reviewed pt's history and workup with Dr. Irving.  At bedside evaluation, they agree with the plan of care.  Elevated D-dimer is likely s/t to pt pregnancy. Pt is on lovenox and her Hx is not concerning for PE.   2113- Rechecked pt. Pt is resting comfortably. Pain is improved. Notified pt of her CXR, EKG, and lab results. Discussed the plan to discharge the pt home. I offered the pt an Rx for Flexeril, but the pt declined this, stating that she will just use Tylenol and Ice/Heat for  "her pain. I instructed the pt to f/u with her OB/GYN. RTED instructions given. Pt understands and agrees with the plan, all questions answered.    Reviewed implications of results, diagnosis, meds, responsibility to follow up, warning signs and symptoms of possible worsening, potential complications and reasons to return to ER with patient.  Discussed all results and noted any abnormalities with patient.  Discussed absolute need to recheck abnormalities with their PCP and OB/GYN.     Discussed plan for discharge, as there is no emergent indication for admission.  Pt is agreeable and understands need for follow up and repeat testing.  Pt is aware that discharge does not mean that nothing is wrong but it indicates no emergency is present.  Pt is discharged with instructions to follow up with primary care doctor to have their blood pressure rechecked.       DIAGNOSIS  Final diagnoses:   Chest wall pain       FOLLOW UP   Dom Moreira MD  [address]  [facility] [id number] [phone]          Becky Mendez MD  [address] [facility] [address] [phone]      keep scheduled appointment on Thursday      COURSE & MEDICAL DECISION MAKING  Pertinent Labs and Imaging studies that were ordered and reviewed are noted above.  Results were reviewed/discussed with the patient and they were also made aware of online assess.   Pt also made aware that some labs, such as cultures, will not be resulted during ER visit and follow up with PMD is necessary.     MEDICATIONS GIVEN IN ER  Medications - No data to display    /80   Pulse 90   Temp 97.1 °F (36.2 °C) (Tympanic)   Resp 18   Ht 160 cm (63\")   Wt 90.7 kg (200 lb)   LMP 10/02/2019 (Exact Date)   SpO2 100%   BMI 35.43 kg/m²       I personally reviewed the past medical history, past surgical history, social history, family history, current medications and allergies as they appear in this chart.  The scribe's note accurately reflects " the work and decisions made by me.     Documentation assistance provided by fransisco Bond for GREGORY Branham on 1/7/2020 at 9:20 PM. Information recorded by the caryibe was done at my direction and has been verified and validated by me.     Erik Bond  01/07/20 2124       Chanell Fox, MARYAN  01/07/20 [id number]

## 2020-01-08 NOTE — ED PROVIDER NOTES
"Pt at 14 weeks gestation () presents to the ED complaining of intermittent \"stabbing\" L sided chest pain radiating to back that began at 1530. Pt states pain is exacerbated with movement, deep breathing, and laughing. Pt denies new SOA. Per pt, she has hx of Factor V Leiden's and hx of DVT, affirms she is currently on Lovenox.     On exam, pt is resting comfortably, in no distress, and without focal neurologic deficit. Pt's heart is RRR without murmur, lungs CTAB. Pt has reproducible tenderness to L sub-scapularis region, also reproducible with movement.     I agree with midlevel plan for CXR for further evaluation, with plan to shield the pt's abd.     The RASHEED and I have discussed this patient's history, physical exam, and treatment plan.  I have reviewed the documentation and personally had a face to face interaction with the patient. I affirm the documentation and agree with the treatment and plan.  The attached note describes my personal findings.    Documentation assistance provided by fransisco Mohr for Dr. Irving.  Information recorded by the scribe was done at my direction and has been verified and validated by me.       Chanell Mohr  20 [id number]       Olu Irving MD  20 1525    "

## 2020-01-08 NOTE — DISCHARGE INSTRUCTIONS
Continue current home medications  Tylenol as needed for pain  Heat or ice to chest wall as needed  Gentle stretching  Keep scheduled appointment with OB this week  Return to the ER for fever, chills, chest pain, shortness of breath or any new or worsening symptoms

## 2020-01-08 NOTE — TELEPHONE ENCOUNTER
Patient was seen at [facility] yesterday and was told to call our office today.  She has an appt with Dr Mendez this Friday.  She says that she is having a stabbing pain in her back today.  She wants to know if she can wait until her next appt, Please advise.

## 2020-01-08 NOTE — TELEPHONE ENCOUNTER
Looks like she is 14 wks pregnant and had chest pain and xray was wnl.  Didn't look like it was pregnancy related, but if she would like to be further evaluated, we can offer a problem visit withu/s today to check CL

## 2020-01-08 NOTE — ED TRIAGE NOTES
Pt reports that she is 14 weeks pregnant and is having chest pain in the left side of her chest that radiates into her back. Pt denies SOA. States that the pain is worse with movement and taking a deep breath. Pt states that she has a hx of DVT in her left leg and is on lovenox.

## 2020-01-10 ENCOUNTER — ROUTINE PRENATAL (OUTPATIENT)
Dept: OBSTETRICS AND GYNECOLOGY | Age: 25
End: 2020-01-10

## 2020-01-10 VITALS — BODY MASS INDEX: 36.49 KG/M2 | DIASTOLIC BLOOD PRESSURE: 66 MMHG | SYSTOLIC BLOOD PRESSURE: 110 MMHG | WEIGHT: 206 LBS

## 2020-01-10 DIAGNOSIS — Z3A.14 14 WEEKS GESTATION OF PREGNANCY: Primary | ICD-10-CM

## 2020-01-10 LAB
2ND TRIMESTER 4 SCREEN SERPL-IMP: NORMAL
AFP INTERP SERPL-IMP: NORMAL
CLARITY, POC: CLEAR
COLOR UR: YELLOW
GLUCOSE UR STRIP-MCNC: NEGATIVE MG/DL
PROT UR STRIP-MCNC: NEGATIVE MG/DL

## 2020-01-10 PROCEDURE — 0502F SUBSEQUENT PRENATAL CARE: CPT | Performed by: OBSTETRICS & GYNECOLOGY

## 2020-01-10 PROCEDURE — 81002 URINALYSIS NONAUTO W/O SCOPE: CPT | Performed by: OBSTETRICS & GYNECOLOGY

## 2020-01-10 NOTE — PROGRESS NOTES
Pt comes in for routine f/u appt. She went to ER for chest pain. They did not feel this was consistent with PE at [facility]. They felt she had muscle strain. She reports chest pain has resolved but she has some back pain now. No soa.   O: fh=15 cm, FHT's 148, ext: no cords, edema or tenderness  A/p: hx DVT/Factor V leiden: pt continues lovenox 40 mg SC daily    Chest pain: resolved, pt had ER evaluation that was normal.     rtc 4 weeks, pt declines aneuploidy or AFP screening    Pt declines flu vaccine

## 2020-02-12 ENCOUNTER — PROCEDURE VISIT (OUTPATIENT)
Dept: OBSTETRICS AND GYNECOLOGY | Age: 25
End: 2020-02-12

## 2020-02-12 ENCOUNTER — ROUTINE PRENATAL (OUTPATIENT)
Dept: OBSTETRICS AND GYNECOLOGY | Age: 25
End: 2020-02-12

## 2020-02-12 VITALS — BODY MASS INDEX: 38.05 KG/M2 | SYSTOLIC BLOOD PRESSURE: 108 MMHG | DIASTOLIC BLOOD PRESSURE: 60 MMHG | WEIGHT: 214.8 LBS

## 2020-02-12 DIAGNOSIS — Z36.86 ENCOUNTER FOR SCREENING FOR RISK OF PRE-TERM LABOR: ICD-10-CM

## 2020-02-12 DIAGNOSIS — D68.51 FACTOR V LEIDEN (HCC): ICD-10-CM

## 2020-02-12 DIAGNOSIS — R63.8 INCREASED BMI: ICD-10-CM

## 2020-02-12 DIAGNOSIS — Z36.89 ENCOUNTER FOR FETAL ANATOMIC SURVEY: Primary | ICD-10-CM

## 2020-02-12 DIAGNOSIS — Z13.1 SCREENING FOR DIABETES MELLITUS: ICD-10-CM

## 2020-02-12 DIAGNOSIS — O23.40 GROUP B STREPTOCOCCUS URINARY TRACT INFECTION AFFECTING PREGNANCY, ANTEPARTUM: ICD-10-CM

## 2020-02-12 DIAGNOSIS — Z3A.19 19 WEEKS GESTATION OF PREGNANCY: Primary | ICD-10-CM

## 2020-02-12 DIAGNOSIS — B95.1 GROUP B STREPTOCOCCUS URINARY TRACT INFECTION AFFECTING PREGNANCY, ANTEPARTUM: ICD-10-CM

## 2020-02-12 LAB
CLARITY, POC: CLEAR
COLOR UR: YELLOW
GLUCOSE UR STRIP-MCNC: NEGATIVE MG/DL
PROT UR STRIP-MCNC: NEGATIVE MG/DL

## 2020-02-12 PROCEDURE — 0502F SUBSEQUENT PRENATAL CARE: CPT | Performed by: OBSTETRICS & GYNECOLOGY

## 2020-02-12 PROCEDURE — 76817 TRANSVAGINAL US OBSTETRIC: CPT | Performed by: OBSTETRICS & GYNECOLOGY

## 2020-02-12 PROCEDURE — 76805 OB US >/= 14 WKS SNGL FETUS: CPT | Performed by: OBSTETRICS & GYNECOLOGY

## 2020-02-12 PROCEDURE — 81002 URINALYSIS NONAUTO W/O SCOPE: CPT | Performed by: OBSTETRICS & GYNECOLOGY

## 2020-02-12 NOTE — PROGRESS NOTES
Pt presents for u/s and routine visit. She denies any n/v, vag bleeding or pain. She has not felt fetal movement yet.   O: u/s: normal anatomic survey and cervical length    A/p: anatomy u/s completed    Factor V leiden with hx DVT: pt on lovenox 40 mg SC daily, will continue until approx 35 weeks and change to heparin; Scott REES following    GBS UTI in preg: pt aware she is carrier for preg, will repeat ucx today    Increased BMI: recommend early one hour prior to next visit. Pt cannot do today, order given and pt will return at her convenience    rtc 4 weeks

## 2020-02-14 LAB
BACTERIA UR CULT: NO GROWTH
BACTERIA UR CULT: NORMAL

## 2020-02-17 ENCOUNTER — TELEPHONE (OUTPATIENT)
Dept: OBSTETRICS AND GYNECOLOGY | Age: 25
End: 2020-02-17

## 2020-02-17 NOTE — TELEPHONE ENCOUNTER
----- Message from Becky Mendez MD sent at 2/16/2020 11:26 PM EST -----  Call pt, her urine culture is negative

## 2020-02-18 LAB — GLUCOSE 1H P 50 G GLC PO SERPL-MCNC: 112 MG/DL (ref 65–179)

## 2020-02-19 ENCOUNTER — TELEPHONE (OUTPATIENT)
Dept: OBSTETRICS AND GYNECOLOGY | Age: 25
End: 2020-02-19

## 2020-02-19 NOTE — TELEPHONE ENCOUNTER
----- Message from Becky Mendez MD sent at 2/19/2020  8:21 AM EST -----  Please call Ines, she passes her early glucose

## 2020-03-03 ENCOUNTER — OFFICE VISIT (OUTPATIENT)
Dept: ONCOLOGY | Facility: CLINIC | Age: 25
End: 2020-03-03

## 2020-03-03 ENCOUNTER — LAB (OUTPATIENT)
Dept: LAB | Facility: HOSPITAL | Age: 25
End: 2020-03-03

## 2020-03-03 VITALS
HEIGHT: 65 IN | DIASTOLIC BLOOD PRESSURE: 84 MMHG | SYSTOLIC BLOOD PRESSURE: 132 MMHG | WEIGHT: 218.5 LBS | HEART RATE: 97 BPM | TEMPERATURE: 97.4 F | OXYGEN SATURATION: 98 % | BODY MASS INDEX: 36.4 KG/M2 | RESPIRATION RATE: 16 BRPM

## 2020-03-03 DIAGNOSIS — Z86.718 HISTORY OF DVT (DEEP VEIN THROMBOSIS): Primary | ICD-10-CM

## 2020-03-03 DIAGNOSIS — D68.51 FACTOR V LEIDEN (HCC): Primary | ICD-10-CM

## 2020-03-03 DIAGNOSIS — D68.51 FACTOR V LEIDEN (HCC): ICD-10-CM

## 2020-03-03 LAB
BASOPHILS # BLD AUTO: 0.04 10*3/MM3 (ref 0–0.2)
BASOPHILS NFR BLD AUTO: 0.5 % (ref 0–1.5)
DEPRECATED RDW RBC AUTO: 42.5 FL (ref 37–54)
EOSINOPHIL # BLD AUTO: 0.18 10*3/MM3 (ref 0–0.4)
EOSINOPHIL NFR BLD AUTO: 2.2 % (ref 0.3–6.2)
ERYTHROCYTE [DISTWIDTH] IN BLOOD BY AUTOMATED COUNT: 12.8 % (ref 12.3–15.4)
HCT VFR BLD AUTO: 35.8 % (ref 34–46.6)
HGB BLD-MCNC: 12.3 G/DL (ref 12–15.9)
IMM GRANULOCYTES # BLD AUTO: 0.11 10*3/MM3 (ref 0–0.05)
IMM GRANULOCYTES NFR BLD AUTO: 1.3 % (ref 0–0.5)
LYMPHOCYTES # BLD AUTO: 1.29 10*3/MM3 (ref 0.7–3.1)
LYMPHOCYTES NFR BLD AUTO: 15.6 % (ref 19.6–45.3)
MCH RBC QN AUTO: 31.7 PG (ref 26.6–33)
MCHC RBC AUTO-ENTMCNC: 34.4 G/DL (ref 31.5–35.7)
MCV RBC AUTO: 92.3 FL (ref 79–97)
MONOCYTES # BLD AUTO: 0.53 10*3/MM3 (ref 0.1–0.9)
MONOCYTES NFR BLD AUTO: 6.4 % (ref 5–12)
NEUTROPHILS # BLD AUTO: 6.11 10*3/MM3 (ref 1.7–7)
NEUTROPHILS NFR BLD AUTO: 74 % (ref 42.7–76)
NRBC BLD AUTO-RTO: 0 /100 WBC (ref 0–0.2)
PLATELET # BLD AUTO: 253 10*3/MM3 (ref 140–450)
PMV BLD AUTO: 9.9 FL (ref 6–12)
RBC # BLD AUTO: 3.88 10*6/MM3 (ref 3.77–5.28)
WBC NRBC COR # BLD: 8.26 10*3/MM3 (ref 3.4–10.8)

## 2020-03-03 PROCEDURE — 36415 COLL VENOUS BLD VENIPUNCTURE: CPT

## 2020-03-03 PROCEDURE — 85025 COMPLETE CBC W/AUTO DIFF WBC: CPT

## 2020-03-03 PROCEDURE — 99213 OFFICE O/P EST LOW 20 MIN: CPT | Performed by: INTERNAL MEDICINE

## 2020-03-03 NOTE — PROGRESS NOTES
[facility] GROUP OUTPATIENT FOLLOW UP CLINIC VISIT    REASON FOR FOLLOW-UP:    1.  History of postoperative left lower extremity DVT following arthroscopic left knee surgery.  Also provoked by oral estrogen contraceptive use.  2.  She is a heterozygote for the factor V Leiden R506Q mutation  3.  Intrauterine pregnancy currently at 22 weeks gestation, estimated due date 7/8/2020    HISTORY OF PRESENT ILLNESS:  Ines Padron is a 24 y.o. female who returns today for follow up of the above issue.      She is currently about 22 weeks pregnant.  Continues Lovenox 40 mg daily.  Tolerates this well with mild bruising at the injection sites.  Nausea resolved.  She is having a boy.    HEMATOLOGIC HISTORY:  She was initially seen back in June 2014.  At that time she was a 19-year-old female.  She was referred with a history of left lower extremity DVT following knee surgery.  She had an arthroscopic left knee procedure in May 2012 and had a subsequent surgery on 9/27/2012 by Dr. Dom Garcia with a cartilage implant in the left knee.  On 10/16/2012 she was diagnosed with a left popliteal and calf vein DVT.  This was an incompletely occlusive DVT.  She was on an estrogen containing oral contraceptive at the time.  She was started on warfarin which was discontinued as of 2/1/2013.  A venous duplex on 2/6/2013 showed an old calf vein thrombosis in the left leg.     She subsequently developed right knee pain.  She had a right knee arthroplasty with excision of a ganglion cyst and repair of the lateral meniscus on 5/12/2014.     She was initially seen here in the office on 6/2/2014 18.  She was prescribed prophylactic Xarelto at 10 mg daily.     She was found to be a heterozygote for the factor V Leiden mutation.  There was no evidence for a prothrombin gene mutation.  She stopped the Xarelto when her leg came out of the brace.    ALLERGIES:  No Known Allergies    MEDICATIONS:  The medication list has been reviewed  "with the patient by the medical assistant, and the list has been updated in the electronic medical record, which I reviewed.  Medication dosages and frequencies were confirmed to be accurate.    REVIEW OF SYSTEMS:  PAIN:  See Vital Signs below.  GENERAL:  No fevers, chills, night sweats, or unintended weight loss.  SKIN:  No rashes or non-healing lesions.  HEME/LYMPH:  No abnormal bleeding.  No palpable lymphadenopathy.  EYES:  No vision changes or diplopia.  ENT:  No sore throat or difficulty swallowing.  RESPIRATORY:  No cough, shortness of breath, hemoptysis, or wheezing.  CARDIOVASCULAR:  No chest pain, palpitations, orthopnea, or dyspnea on exertion.  GASTROINTESTINAL:    Nausea has resolved.  GENITOURINARY:  No dysuria or hematuria.  MUSCULOSKELETAL:  No joint pain, swelling, or erythema.  NEUROLOGIC:  No dizziness, loss of consciousness, or seizures.  PSYCHIATRIC:  No depression, anxiety, or mood changes.    Vitals:    03/03/20 0836   BP: 132/84   Pulse: 97   Resp: 16   Temp: 97.4 °F (36.3 °C)   TempSrc: Oral   SpO2: 98%   Weight: 99.1 kg (218 lb 8 oz)   Height: 164 cm (64.57\")   PainSc: 0-No pain  Comment: Factor V Leiden       PHYSICAL EXAMINATION:  GENERAL:  Well-developed well-nourished female; awake, alert and oriented, in no acute distress.  SKIN:  Warm and dry, without rashes, purpura, or petechiae.  HEAD:  Normocephalic, atraumatic.  EARS:  Hearing intact.  NOSE:  Septum midline.  No excoriations or nasal discharge.  MOUTH:  No stomatitis or ulcers.  Lips are normal.  THROAT:  Oropharynx without lesions or exudates.  LYMPHATICS:  No cervical, supraclavicular, or axillary lymphadenopathy.  CHEST:  Lungs are clear to auscultation bilaterally.  No wheezes, rales, or rhonchi.  HEART:  Regular rate; normal rhythm.  No murmurs, gallops or rubs.  ABDOMEN: Mild bruising at the injection sites  EXTREMITIES:  No clubbing, cyanosis, or edema.  NEUROLOGICAL:  No focal neurologic deficits.    DIAGNOSTIC " DATA:  Results for orders placed or performed in visit on 03/03/20   CBC Auto Differential   Result Value Ref Range    WBC 8.26 3.40 - 10.80 10*3/mm3    RBC 3.88 3.77 - 5.28 10*6/mm3    Hemoglobin 12.3 12.0 - 15.9 g/dL    Hematocrit 35.8 34.0 - 46.6 %    MCV 92.3 79.0 - 97.0 fL    MCH 31.7 26.6 - 33.0 pg    MCHC 34.4 31.5 - 35.7 g/dL    RDW 12.8 12.3 - 15.4 %    RDW-SD 42.5 37.0 - 54.0 fl    MPV 9.9 6.0 - 12.0 fL    Platelets 253 140 - 450 10*3/mm3    Neutrophil % 74.0 42.7 - 76.0 %    Lymphocyte % 15.6 (L) 19.6 - 45.3 %    Monocyte % 6.4 5.0 - 12.0 %    Eosinophil % 2.2 0.3 - 6.2 %    Basophil % 0.5 0.0 - 1.5 %    Immature Grans % 1.3 (H) 0.0 - 0.5 %    Neutrophils, Absolute 6.11 1.70 - 7.00 10*3/mm3    Lymphocytes, Absolute 1.29 0.70 - 3.10 10*3/mm3    Monocytes, Absolute 0.53 0.10 - 0.90 10*3/mm3    Eosinophils, Absolute 0.18 0.00 - 0.40 10*3/mm3    Basophils, Absolute 0.04 0.00 - 0.20 10*3/mm3    Immature Grans, Absolute 0.11 (H) 0.00 - 0.05 10*3/mm3    nRBC 0.0 0.0 - 0.2 /100 WBC       IMAGING:  None reviewed    ASSESSMENT:  This is a 24 y.o. female with:  1. She has a history of left lower extremity DVT in the popliteal and calf vein that was provoked following a left knee arthroscopic procedure as well as concomitant oral estrogen contraceptive use.  In addition, she is a heterozygote for the factor V Leiden mutation.      2.  Intrauterine pregnancy, currently at 22 weeks gestation, estimated due date 7/8/2020.    PLAN:  1.  Continue prophylactic anticoagulation with Lovenox at 40 mg once daily for now.  At some point I think is going to be reasonable to increase the dose a little bit to 30 mg twice daily and I will see her back in 6 weeks to further discuss this and likely make that change at that time.  When she gets to about 36 weeks gestation she will be switched to subcutaneous heparin.

## 2020-03-13 ENCOUNTER — ROUTINE PRENATAL (OUTPATIENT)
Dept: OBSTETRICS AND GYNECOLOGY | Age: 25
End: 2020-03-13

## 2020-03-13 VITALS — BODY MASS INDEX: 37.1 KG/M2 | DIASTOLIC BLOOD PRESSURE: 60 MMHG | SYSTOLIC BLOOD PRESSURE: 108 MMHG | WEIGHT: 220 LBS

## 2020-03-13 DIAGNOSIS — D68.51 FACTOR V LEIDEN (HCC): ICD-10-CM

## 2020-03-13 DIAGNOSIS — O23.40 GROUP B STREPTOCOCCUS URINARY TRACT INFECTION AFFECTING PREGNANCY, ANTEPARTUM: ICD-10-CM

## 2020-03-13 DIAGNOSIS — B95.1 GROUP B STREPTOCOCCUS URINARY TRACT INFECTION AFFECTING PREGNANCY, ANTEPARTUM: ICD-10-CM

## 2020-03-13 DIAGNOSIS — Z86.718 HISTORY OF DVT (DEEP VEIN THROMBOSIS): ICD-10-CM

## 2020-03-13 DIAGNOSIS — Z3A.23 23 WEEKS GESTATION OF PREGNANCY: Primary | ICD-10-CM

## 2020-03-13 PROCEDURE — 81002 URINALYSIS NONAUTO W/O SCOPE: CPT | Performed by: OBSTETRICS & GYNECOLOGY

## 2020-03-13 PROCEDURE — 0502F SUBSEQUENT PRENATAL CARE: CPT | Performed by: OBSTETRICS & GYNECOLOGY

## 2020-03-13 NOTE — PROGRESS NOTES
Pt presents for routine visit today. She denies vag bleeding, leaking fluid or regular ctx. She notes active fetal movement    A/p: 23 weeks: reviewed PTL warnings    Increased BMI: encouraged ongoing exercise and watch wt gain, plan repeat one hour at f/u    Factor V leiden/hx DVT: pt on lovenox prophylaxis  Heme MD plans to increase her to 30 mg twice daily in 3rd trimester then change to heparin for the last month

## 2020-04-03 ENCOUNTER — TELEPHONE (OUTPATIENT)
Dept: ONCOLOGY | Facility: CLINIC | Age: 25
End: 2020-04-03

## 2020-04-03 NOTE — TELEPHONE ENCOUNTER
----- Message from Dajuan Ayoub MD sent at 4/1/2020  4:48 PM EDT -----  Regarding: RE: appt   Yes, for a telehealth visit please.  [facility]    ----- Message -----  From: Liu Yessi NORMA  Sent: 4/1/2020   9:22 AM EDT  To: Dajuan Ayoub MD  Subject: appt                                             Pt stated that her lovenox was being checked / her cbc and that you were moving her to two shots a day instead of one. Is it ok to add pt to 4/13/20. Please advise.    Thank you   Yessi

## 2020-04-06 DIAGNOSIS — Z86.718 HISTORY OF DVT (DEEP VEIN THROMBOSIS): Primary | ICD-10-CM

## 2020-04-14 ENCOUNTER — LAB (OUTPATIENT)
Dept: LAB | Facility: HOSPITAL | Age: 25
End: 2020-04-14

## 2020-04-14 ENCOUNTER — TELEMEDICINE (OUTPATIENT)
Dept: ONCOLOGY | Facility: CLINIC | Age: 25
End: 2020-04-14

## 2020-04-14 ENCOUNTER — APPOINTMENT (OUTPATIENT)
Dept: LAB | Facility: HOSPITAL | Age: 25
End: 2020-04-14

## 2020-04-14 ENCOUNTER — DOCUMENTATION (OUTPATIENT)
Dept: ONCOLOGY | Facility: CLINIC | Age: 25
End: 2020-04-14

## 2020-04-14 ENCOUNTER — ROUTINE PRENATAL (OUTPATIENT)
Dept: OBSTETRICS AND GYNECOLOGY | Age: 25
End: 2020-04-14

## 2020-04-14 ENCOUNTER — PROCEDURE VISIT (OUTPATIENT)
Dept: OBSTETRICS AND GYNECOLOGY | Age: 25
End: 2020-04-14

## 2020-04-14 VITALS
HEIGHT: 65 IN | SYSTOLIC BLOOD PRESSURE: 115 MMHG | TEMPERATURE: 98.3 F | BODY MASS INDEX: 38.37 KG/M2 | RESPIRATION RATE: 16 BRPM | HEART RATE: 105 BPM | DIASTOLIC BLOOD PRESSURE: 80 MMHG | WEIGHT: 230.3 LBS | OXYGEN SATURATION: 98 %

## 2020-04-14 VITALS — BODY MASS INDEX: 38.32 KG/M2 | SYSTOLIC BLOOD PRESSURE: 102 MMHG | WEIGHT: 227.2 LBS | DIASTOLIC BLOOD PRESSURE: 62 MMHG

## 2020-04-14 DIAGNOSIS — R63.8 INCREASED BMI: ICD-10-CM

## 2020-04-14 DIAGNOSIS — Z13.0 SCREENING FOR IRON DEFICIENCY ANEMIA: ICD-10-CM

## 2020-04-14 DIAGNOSIS — Z86.718 HISTORY OF DVT (DEEP VEIN THROMBOSIS): Primary | ICD-10-CM

## 2020-04-14 DIAGNOSIS — Z3A.28 28 WEEKS GESTATION OF PREGNANCY: ICD-10-CM

## 2020-04-14 DIAGNOSIS — D68.51 FACTOR V LEIDEN (HCC): Primary | ICD-10-CM

## 2020-04-14 DIAGNOSIS — Z86.718 HISTORY OF DVT (DEEP VEIN THROMBOSIS): ICD-10-CM

## 2020-04-14 DIAGNOSIS — D68.51 FACTOR V LEIDEN (HCC): ICD-10-CM

## 2020-04-14 DIAGNOSIS — O23.40 GROUP B STREPTOCOCCUS URINARY TRACT INFECTION AFFECTING PREGNANCY, ANTEPARTUM: ICD-10-CM

## 2020-04-14 DIAGNOSIS — Z3A.27 27 WEEKS GESTATION OF PREGNANCY: Primary | ICD-10-CM

## 2020-04-14 DIAGNOSIS — B95.1 GROUP B STREPTOCOCCUS URINARY TRACT INFECTION AFFECTING PREGNANCY, ANTEPARTUM: ICD-10-CM

## 2020-04-14 DIAGNOSIS — Z13.1 SCREENING FOR DIABETES MELLITUS: ICD-10-CM

## 2020-04-14 LAB
ALBUMIN SERPL-MCNC: 3.5 G/DL (ref 3.5–5.2)
ALBUMIN/GLOB SERPL: 1 G/DL (ref 1.1–2.4)
ALP SERPL-CCNC: 58 U/L (ref 38–116)
ALT SERPL W P-5'-P-CCNC: 20 U/L (ref 0–33)
ANION GAP SERPL CALCULATED.3IONS-SCNC: 15.8 MMOL/L (ref 5–15)
AST SERPL-CCNC: 16 U/L (ref 0–32)
BASOPHILS # BLD AUTO: 0.02 10*3/MM3 (ref 0–0.2)
BASOPHILS NFR BLD AUTO: 0.2 % (ref 0–1.5)
BILIRUB SERPL-MCNC: 0.2 MG/DL (ref 0.2–1.2)
BUN BLD-MCNC: 8 MG/DL (ref 6–20)
BUN/CREAT SERPL: 14.3 (ref 7.3–30)
CALCIUM SPEC-SCNC: 9.2 MG/DL (ref 8.5–10.2)
CHLORIDE SERPL-SCNC: 99 MMOL/L (ref 98–107)
CLARITY, POC: CLEAR
CO2 SERPL-SCNC: 21.2 MMOL/L (ref 22–29)
COLOR UR: YELLOW
CREAT BLD-MCNC: 0.56 MG/DL (ref 0.6–1.1)
DEPRECATED RDW RBC AUTO: 44.6 FL (ref 37–54)
EOSINOPHIL # BLD AUTO: 0.17 10*3/MM3 (ref 0–0.4)
EOSINOPHIL NFR BLD AUTO: 2.1 % (ref 0.3–6.2)
ERYTHROCYTE [DISTWIDTH] IN BLOOD BY AUTOMATED COUNT: 12.9 % (ref 12.3–15.4)
GFR SERPL CREATININE-BSD FRML MDRD: 131 ML/MIN/1.73
GLOBULIN UR ELPH-MCNC: 3.6 GM/DL (ref 1.8–3.5)
GLUCOSE BLD-MCNC: 97 MG/DL (ref 74–124)
GLUCOSE UR STRIP-MCNC: NEGATIVE MG/DL
HCT VFR BLD AUTO: 36.6 % (ref 34–46.6)
HGB BLD-MCNC: 12.3 G/DL (ref 12–15.9)
IMM GRANULOCYTES # BLD AUTO: 0.09 10*3/MM3 (ref 0–0.05)
IMM GRANULOCYTES NFR BLD AUTO: 1.1 % (ref 0–0.5)
LYMPHOCYTES # BLD AUTO: 1.24 10*3/MM3 (ref 0.7–3.1)
LYMPHOCYTES NFR BLD AUTO: 15 % (ref 19.6–45.3)
MCH RBC QN AUTO: 32.2 PG (ref 26.6–33)
MCHC RBC AUTO-ENTMCNC: 33.6 G/DL (ref 31.5–35.7)
MCV RBC AUTO: 95.8 FL (ref 79–97)
MONOCYTES # BLD AUTO: 0.57 10*3/MM3 (ref 0.1–0.9)
MONOCYTES NFR BLD AUTO: 6.9 % (ref 5–12)
NEUTROPHILS # BLD AUTO: 6.2 10*3/MM3 (ref 1.7–7)
NEUTROPHILS NFR BLD AUTO: 74.7 % (ref 42.7–76)
NRBC BLD AUTO-RTO: 0 /100 WBC (ref 0–0.2)
PLATELET # BLD AUTO: 222 10*3/MM3 (ref 140–450)
PMV BLD AUTO: 9.4 FL (ref 6–12)
POTASSIUM BLD-SCNC: 4 MMOL/L (ref 3.5–4.7)
PROT SERPL-MCNC: 7.1 G/DL (ref 6.3–8)
PROT UR STRIP-MCNC: NEGATIVE MG/DL
RBC # BLD AUTO: 3.82 10*6/MM3 (ref 3.77–5.28)
SODIUM BLD-SCNC: 136 MMOL/L (ref 134–145)
WBC NRBC COR # BLD: 8.29 10*3/MM3 (ref 3.4–10.8)

## 2020-04-14 PROCEDURE — 81002 URINALYSIS NONAUTO W/O SCOPE: CPT | Performed by: OBSTETRICS & GYNECOLOGY

## 2020-04-14 PROCEDURE — 76816 OB US FOLLOW-UP PER FETUS: CPT | Performed by: OBSTETRICS & GYNECOLOGY

## 2020-04-14 PROCEDURE — 85025 COMPLETE CBC W/AUTO DIFF WBC: CPT

## 2020-04-14 PROCEDURE — 99214 OFFICE O/P EST MOD 30 MIN: CPT | Performed by: INTERNAL MEDICINE

## 2020-04-14 PROCEDURE — 80053 COMPREHEN METABOLIC PANEL: CPT

## 2020-04-14 PROCEDURE — 36415 COLL VENOUS BLD VENIPUNCTURE: CPT

## 2020-04-14 PROCEDURE — 0502F SUBSEQUENT PRENATAL CARE: CPT | Performed by: OBSTETRICS & GYNECOLOGY

## 2020-04-14 NOTE — PROGRESS NOTES
[facility] GROUP OUTPATIENT FOLLOW UP CLINIC VISIT    REASON FOR FOLLOW-UP:    1.  History of postoperative left lower extremity DVT following arthroscopic left knee surgery.  Also provoked by oral estrogen contraceptive use.  2.  She is a heterozygote for the factor V Leiden R506Q mutation  3.  Intrauterine pregnancy currently at 28 weeks gestation, estimated due date 7/8/2020    HISTORY OF PRESENT ILLNESS:  Ines Padron is a 24 y.o. female who returns today for follow up of the above issue.      She is currently about 28 weeks pregnant.  Continues Lovenox 40 mg daily.  She tolerates this well with really no  bruising at the injection sites at this point.  No other bleeding.      HEMATOLOGIC HISTORY:  She was initially seen back in June 2014.  At that time she was a 19-year-old female.  She was referred with a history of left lower extremity DVT following knee surgery.  She had an arthroscopic left knee procedure in May 2012 and had a subsequent surgery on 9/27/2012 by Dr. Dom Garcia with a cartilage implant in the left knee.  On 10/16/2012 she was diagnosed with a left popliteal and calf vein DVT.  This was an incompletely occlusive DVT.  She was on an estrogen containing oral contraceptive at the time.  She was started on warfarin which was discontinued as of 2/1/2013.  A venous duplex on 2/6/2013 showed an old calf vein thrombosis in the left leg.     She subsequently developed right knee pain.  She had a right knee arthroplasty with excision of a ganglion cyst and repair of the lateral meniscus on 5/12/2014.     She was initially seen here in the office on 6/2/2014 18.  She was prescribed prophylactic Xarelto at 10 mg daily.     She was found to be a heterozygote for the factor V Leiden mutation.  There was no evidence for a prothrombin gene mutation.  She stopped the Xarelto when her leg came out of the brace.    ALLERGIES:  No Known Allergies    MEDICATIONS:  The medication list has been  "reviewed with the patient by the medical assistant, and the list has been updated in the electronic medical record, which I reviewed.  Medication dosages and frequencies were confirmed to be accurate.    REVIEW OF SYSTEMS:  PAIN:  See Vital Signs below.  GENERAL:  No fevers, chills, night sweats, or unintended weight loss.  SKIN:  No rashes or non-healing lesions.  HEME/LYMPH:  No abnormal bleeding.  No palpable lymphadenopathy.  No bruising at injection sites at this point.    EYES:  No vision changes or diplopia.  ENT:  No sore throat or difficulty swallowing.  RESPIRATORY:  No cough, shortness of breath, hemoptysis, or wheezing.  CARDIOVASCULAR:  No chest pain, palpitations, orthopnea, or dyspnea on exertion.  GASTROINTESTINAL:    No nausea/vomiting/diarrhea  GENITOURINARY:  No dysuria or hematuria.  MUSCULOSKELETAL:  No joint pain, swelling, or erythema.  NEUROLOGIC:  No dizziness, loss of consciousness, or seizures.  PSYCHIATRIC:  No depression, anxiety, or mood changes.    Vitals:    04/14/20 0836   BP: 115/80   Pulse: 105   Resp: 16   Temp: 98.3 °F (36.8 °C)   TempSrc: Oral   SpO2: 98%   Weight: 104 kg (230 lb 4.8 oz)   Height: 164 cm (64.57\")   PainSc: 0-No pain  Comment: Factor V Leiden       PHYSICAL EXAMINATION:  GENERAL:  Well-developed well-nourished female; awake, alert and oriented, in no acute distress.  She was seen with a video visit today and she was sitting in her are waiting for her OB appointment.      DIAGNOSTIC DATA:  Results for orders placed or performed in visit on 04/14/20   Comprehensive Metabolic Panel   Result Value Ref Range    Glucose 97 74 - 124 mg/dL    BUN 8 6 - 20 mg/dL    Creatinine 0.56 (L) 0.60 - 1.10 mg/dL    Sodium 136 134 - 145 mmol/L    Potassium 4.0 3.5 - 4.7 mmol/L    Chloride 99 98 - 107 mmol/L    CO2 21.2 (L) 22.0 - 29.0 mmol/L    Calcium 9.2 8.5 - 10.2 mg/dL    Total Protein 7.1 6.3 - 8.0 g/dL    Albumin 3.50 3.50 - 5.20 g/dL    ALT (SGPT) 20 0 - 33 U/L    AST (SGOT) " 16 0 - 32 U/L    Alkaline Phosphatase 58 38 - 116 U/L    Total Bilirubin 0.2 0.2 - 1.2 mg/dL    eGFR Non African Amer 131 >60 mL/min/1.73    Globulin 3.6 (H) 1.8 - 3.5 gm/dL    A/G Ratio 1.0 (L) 1.1 - 2.4 g/dL    BUN/Creatinine Ratio 14.3 7.3 - 30.0    Anion Gap 15.8 (H) 5.0 - 15.0 mmol/L   CBC Auto Differential   Result Value Ref Range    WBC 8.29 3.40 - 10.80 10*3/mm3    RBC 3.82 3.77 - 5.28 10*6/mm3    Hemoglobin 12.3 12.0 - 15.9 g/dL    Hematocrit 36.6 34.0 - 46.6 %    MCV 95.8 79.0 - 97.0 fL    MCH 32.2 26.6 - 33.0 pg    MCHC 33.6 31.5 - 35.7 g/dL    RDW 12.9 12.3 - 15.4 %    RDW-SD 44.6 37.0 - 54.0 fl    MPV 9.4 6.0 - 12.0 fL    Platelets 222 140 - 450 10*3/mm3    Neutrophil % 74.7 42.7 - 76.0 %    Lymphocyte % 15.0 (L) 19.6 - 45.3 %    Monocyte % 6.9 5.0 - 12.0 %    Eosinophil % 2.1 0.3 - 6.2 %    Basophil % 0.2 0.0 - 1.5 %    Immature Grans % 1.1 (H) 0.0 - 0.5 %    Neutrophils, Absolute 6.20 1.70 - 7.00 10*3/mm3    Lymphocytes, Absolute 1.24 0.70 - 3.10 10*3/mm3    Monocytes, Absolute 0.57 0.10 - 0.90 10*3/mm3    Eosinophils, Absolute 0.17 0.00 - 0.40 10*3/mm3    Basophils, Absolute 0.02 0.00 - 0.20 10*3/mm3    Immature Grans, Absolute 0.09 (H) 0.00 - 0.05 10*3/mm3    nRBC 0.0 0.0 - 0.2 /100 WBC       IMAGING:  None reviewed    ASSESSMENT:  This is a 24 y.o. female with:  1. She has a history of left lower extremity DVT in the popliteal and calf vein that was provoked following a left knee arthroscopic procedure as well as concomitant oral estrogen contraceptive use.  In addition, she is a heterozygote for the factor V Leiden mutation.      2.  Intrauterine pregnancy, currently at 28 weeks gestation, estimated due date 7/8/2020.    PLAN:  1.  Continue prophylactic anticoagulation with enoxaparin, but increase the dose at this point to 30 mg q12h.  Prescription electronically sent to her pharmacy.  I will see her back in early June with plans to transition to SQ heparin at that point in preparation for  delivery.  Dose of SQ heparin to be determined at that time.  I advised her to notify us with any problems prior to that.        You have chosen to receive care through a telehealth visit.  Do you consent to use a video/audio connection for your medical care today? Yes    I spent 25 minutes with this visit today, with 15 minutes of that time speaking with the patient explaining the rationale for increasing the enoxaparin dose and making a transition to SQ heparin at 36 weeks.

## 2020-04-14 NOTE — PROGRESS NOTES
Staff message rec from Dr Ayoub about Enoxaparin for pt. See below    Dajuan Ayoub MD sent to Sil[facility] Mark             Just FYI I sent a new rx for Lovenox 30 bid to her pharmacy.  You shouldn't have to do anything, just wanted you to know in case you hear something.  NHAN     Thanks!  NHAN      I contacted [facility] and spoke to Beulah. I inquired on the Lovenox and she states it is a $75 copay. They have it on order and will get it in tomorrow then can fill the rx.     I have notified Dr Ayoub.

## 2020-04-14 NOTE — PROGRESS NOTES
Pt presents for f/u OB visit. She reports active fetal movement. She denies regular ctx, vag bleeding or leaking fluid. She reports she is exercising daily. She just increased her lovenox to 30 mg twice daily per [facility] recommendations  O: u/s: efw 3 lb 5 oz, ( 83 %), AC at 98 %, natasha 20.75    A/p: LGA noted, pt repeating one hour today and passed early screen. Discussed wt gain. Pt notes she is exercising daily and careful with diet. Will add hgbA1c to lab today and refer to [facility] for further evaluation    Hx DVT/factor V leiden: pt just increased lovenox to 30 mg twice daily per heme MD    Hx GBS UTI so pt will need PCN with labor and counseled    Advised daily fmc's, tdap indicated but office out today.

## 2020-04-15 ENCOUNTER — TELEPHONE (OUTPATIENT)
Dept: OBSTETRICS AND GYNECOLOGY | Age: 25
End: 2020-04-15

## 2020-04-15 DIAGNOSIS — R73.09 ELEVATED GLUCOSE TOLERANCE TEST: Primary | ICD-10-CM

## 2020-04-15 LAB
BASOPHILS # BLD AUTO: 0.02 10*3/MM3 (ref 0–0.2)
BASOPHILS NFR BLD AUTO: 0.2 % (ref 0–1.5)
EOSINOPHIL # BLD AUTO: 0.14 10*3/MM3 (ref 0–0.4)
EOSINOPHIL NFR BLD AUTO: 1.7 % (ref 0.3–6.2)
ERYTHROCYTE [DISTWIDTH] IN BLOOD BY AUTOMATED COUNT: 12.1 % (ref 12.3–15.4)
GLUCOSE 1H P 50 G GLC PO SERPL-MCNC: 146 MG/DL (ref 65–179)
HBA1C MFR BLD: 4.7 % (ref 4.8–5.6)
HCT VFR BLD AUTO: 35.9 % (ref 34–46.6)
HGB BLD-MCNC: 12.2 G/DL (ref 12–15.9)
IMM GRANULOCYTES # BLD AUTO: 0.1 10*3/MM3 (ref 0–0.05)
IMM GRANULOCYTES NFR BLD AUTO: 1.2 % (ref 0–0.5)
LYMPHOCYTES # BLD AUTO: 1.1 10*3/MM3 (ref 0.7–3.1)
LYMPHOCYTES NFR BLD AUTO: 13.4 % (ref 19.6–45.3)
MCH RBC QN AUTO: 31.8 PG (ref 26.6–33)
MCHC RBC AUTO-ENTMCNC: 34 G/DL (ref 31.5–35.7)
MCV RBC AUTO: 93.5 FL (ref 79–97)
MONOCYTES # BLD AUTO: 0.48 10*3/MM3 (ref 0.1–0.9)
MONOCYTES NFR BLD AUTO: 5.8 % (ref 5–12)
NEUTROPHILS # BLD AUTO: 6.37 10*3/MM3 (ref 1.7–7)
NEUTROPHILS NFR BLD AUTO: 77.7 % (ref 42.7–76)
NRBC BLD AUTO-RTO: 0 /100 WBC (ref 0–0.2)
PLATELET # BLD AUTO: 229 10*3/MM3 (ref 140–450)
RBC # BLD AUTO: 3.84 10*6/MM3 (ref 3.77–5.28)
WBC # BLD AUTO: 8.21 10*3/MM3 (ref 3.4–10.8)

## 2020-04-15 NOTE — TELEPHONE ENCOUNTER
Called pt and no answer. Left message to call back office and additional testing needed. Placed order for 3 hour test. Sent message to staff.

## 2020-04-17 LAB
GLUCOSE 1H P 100 G GLC PO SERPL-MCNC: 150 MG/DL (ref 65–179)
GLUCOSE 2H P 100 G GLC PO SERPL-MCNC: 123 MG/DL (ref 65–154)
GLUCOSE 3H P 100 G GLC PO SERPL-MCNC: 104 MG/DL (ref 65–139)
GLUCOSE P FAST SERPL-MCNC: 87 MG/DL (ref 65–94)

## 2020-04-22 ENCOUNTER — HOSPITAL ENCOUNTER (OUTPATIENT)
Dept: ULTRASOUND IMAGING | Facility: HOSPITAL | Age: 25
Discharge: HOME | End: 2020-04-22
Admitting: OBSTETRICS & GYNECOLOGY

## 2020-04-22 PROCEDURE — 76811 OB US DETAILED SNGL FETUS: CPT

## 2020-04-23 ENCOUNTER — DOCUMENTATION (OUTPATIENT)
Dept: OBSTETRICS AND GYNECOLOGY | Facility: HOSPITAL | Age: 25
End: 2020-04-23

## 2020-04-23 NOTE — PROGRESS NOTES
MATERNAL-FETAL MEDICINE CONSULTATION                         Bonifacio Cobian MD MPH FACOG             20        REFERRAL:  Ms. Padron is referred by    for MFM consultation on indication of  Her prenatal course is significant for:   Large for Gestational Age,  Hx DVT,   on 30 of lovenox BID  Factor V Leiden,   Obesity BMI 40  Abnormal glucola, 1 abnl on 3 hr GTT    ROS: constitutional, ENT, respiratory, cardiac,GI, neuro, musculoskeletal symptoms all negative   She complained of no dysuria.  Normal fetal movement, no contractions or cramping, no loss of fluid or bleeding vaginally.      PMH  OB History    Para Term  AB Living   1 0 0 0 0 0   SAB TAB Ectopic Molar Multiple Live Births   0 0 0 0 0 0      # Outcome Date GA Lbr Ta/2nd Weight Sex Delivery Anes PTL Lv   1 Current                Past Medical History:   Diagnosis Date   • Asthma     exercised induced   • DVT (deep venous thrombosis) (CMS/HCC)     post op dvt    • Factor V Leiden (CMS/HCC)    • IBS (irritable bowel syndrome)        No Known Allergies    Past Surgical History:   Procedure Laterality Date   • KNEE ARTHROSCOPY  2012   • KNEE SURGERY  2012       Current Outpatient Medications:   •  cetirizine (zyrTEC) 10 MG tablet, Take 10 mg by mouth Daily., Disp: , Rfl:   •  enoxaparin (LOVENOX) 30 MG/0.3ML solution syringe, Inject 0.3 mL under the skin into the appropriate area as directed Every 12 (Twelve) Hours for 60 days., Disp: 18 mL, Rfl: 1  •  Prenatal Vit-Fe Fumarate-FA (PRENATAL, CLASSIC, VITAMIN) 28-0.8 MG tablet tablet, Take  by mouth Daily., Disp: , Rfl:       Family history is negative for mental retardation, trisomy 21, congenital heart disease, spina bifida, cystic fibrosis, muscular dystrophy, other birth defects, Ca[place]ism ancestry.   The patient has a family history of: none of the above.    Social history  Smoking status:    Smokeless tobacco:   Alcohol use:     Street drug use:  Employment:     Physical exam:  Vitals:  BP , pulse   rsp    temp   ENT: benign  Neck: no thyromegaly  Respiratory: benign  Cardiac: NST, no murmurs  GI Abdomen: soft, nontender  Pelvic: deferred, vaginal sono done  Extremities: no edema  Neuro: DTR 2+    See US report    Impression:  Large for Gestational Age, AC 98th%ile, HC 99th %ile, overall 89th%ile   Obesity BMI 40  Abnormal glucola, 1 abnl on 3 hr GTT  Hx DVT,   on 30 of lovenox BID  Factor V Leiden  LGA - overall 89%ile at 29 0/7 weeks  Reassuring sonography.  OLIVE 19.    Recommendations:  1.  Based on her BMI, finding of LGA and results of 3hr GTT, I recommend management as if her GTT was abnormal, since risk factors and findings suggest maternal hyperglycemia despite the A1c and lack of 2 abnormal values on GTT.  · Nutrition consultation and dietary adjustments.  · Start home glucose monitoring with glucometer - fasting, 1hr post prandials daily.  FBS should be below 95 and 1hr post prandial <140.  ·  If these parameters are not met at least 8-90% of the time, or if fasting is high at any point, then insulin therapy can be planned at a future consultation  · If diet controlled, then antepartum testing can begin twice weekly at 34-36 weeks, delivery considered at 39 weeks  · If Insulin controlled, then antepartum testing can begin twice weekly at 30-32 weeks, delivery considered by 38 weeks, and earlier depending on degree of control of blood sugar.  · Sonography for EFW at 34 and 37 weeks    2. History of DVT plus risk factor plus Obesity BMI>40  · I recommend increased lovenox to 40 mg every 12 hours.  I don't believe she needs adjusted dosages.  ACOG states that optimal therapy has not been determined, but evidence shows higher than usual dosages improve levels of anti-factor 10a in the context of obesity alone. Her co-morbidities reinforce a dosage higher than the 30 mg that she is on.      Thank you for this referral for consultation:  Bonifacio Cobian MD MPH FACOG,    4/22/20

## 2020-05-01 ENCOUNTER — ROUTINE PRENATAL (OUTPATIENT)
Dept: OBSTETRICS AND GYNECOLOGY | Age: 25
End: 2020-05-01

## 2020-05-01 VITALS — DIASTOLIC BLOOD PRESSURE: 68 MMHG | SYSTOLIC BLOOD PRESSURE: 118 MMHG | BODY MASS INDEX: 38.62 KG/M2 | WEIGHT: 229 LBS

## 2020-05-01 DIAGNOSIS — O23.40 GROUP B STREPTOCOCCUS URINARY TRACT INFECTION AFFECTING PREGNANCY, ANTEPARTUM: ICD-10-CM

## 2020-05-01 DIAGNOSIS — O99.810 GLUCOSE INTOLERANCE OF PREGNANCY: ICD-10-CM

## 2020-05-01 DIAGNOSIS — B95.1 GROUP B STREPTOCOCCUS URINARY TRACT INFECTION AFFECTING PREGNANCY, ANTEPARTUM: ICD-10-CM

## 2020-05-01 DIAGNOSIS — Z23 NEED FOR DTAP VACCINE: Primary | ICD-10-CM

## 2020-05-01 DIAGNOSIS — Z13.89 SCREENING FOR BLOOD OR PROTEIN IN URINE: ICD-10-CM

## 2020-05-01 DIAGNOSIS — D68.51 FACTOR V LEIDEN (HCC): ICD-10-CM

## 2020-05-01 DIAGNOSIS — Z86.718 HISTORY OF DVT (DEEP VEIN THROMBOSIS): ICD-10-CM

## 2020-05-01 PROBLEM — O24.410 DIET CONTROLLED GESTATIONAL DIABETES MELLITUS (GDM), ANTEPARTUM: Status: ACTIVE | Noted: 2020-05-01

## 2020-05-01 PROBLEM — R73.09 ELEVATED GLUCOSE TOLERANCE TEST: Status: RESOLVED | Noted: 2020-04-15 | Resolved: 2020-05-01

## 2020-05-01 LAB
BILIRUB BLD-MCNC: NEGATIVE MG/DL
CLARITY, POC: CLEAR
COLOR UR: YELLOW
GLUCOSE UR STRIP-MCNC: NEGATIVE MG/DL
KETONES UR QL: NEGATIVE
LEUKOCYTE EST, POC: ABNORMAL
NITRITE UR-MCNC: NEGATIVE MG/ML
PH UR: 8.5 [PH] (ref 5–8)
PROT UR STRIP-MCNC: NEGATIVE MG/DL
RBC # UR STRIP: NEGATIVE /UL
SP GR UR: 1.02 (ref 1–1.03)
UROBILINOGEN UR QL: NORMAL

## 2020-05-01 PROCEDURE — 90471 IMMUNIZATION ADMIN: CPT | Performed by: OBSTETRICS & GYNECOLOGY

## 2020-05-01 PROCEDURE — 0502F SUBSEQUENT PRENATAL CARE: CPT | Performed by: OBSTETRICS & GYNECOLOGY

## 2020-05-01 PROCEDURE — 90715 TDAP VACCINE 7 YRS/> IM: CPT | Performed by: OBSTETRICS & GYNECOLOGY

## 2020-05-01 PROCEDURE — 81003 URINALYSIS AUTO W/O SCOPE: CPT | Performed by: OBSTETRICS & GYNECOLOGY

## 2020-05-01 RX ORDER — BLOOD-GLUCOSE METER
1 KIT MISCELLANEOUS AS NEEDED
Qty: 1 EACH | Refills: 0 | Status: SHIPPED | OUTPATIENT
Start: 2020-05-01 | End: 2021-05-27

## 2020-05-01 NOTE — PROGRESS NOTES
CC: ob visit  HPI: pt presents for routine care. She denies any issues today. She notes active fetal movement. She denies ctx/bleeding. She saw MFM and u/s was normal but accelerated growth noted there. MFM recommends managing as if she has GDM.     A/P: will begin management for GDM: pt verbally counseled and sent rx for glucometer and test strips. Placed order for diabetic counseling. Plan f/u 1 week and advised pt to bring glucose log. Advised her to call if she does not receive contact about diabetic counseling by next Monday.    Factor V leiden/hx DVT: pt seeing hematologist and taking lovenox 30 mg twice daily at this time. MFM recommended increasing to 40 mg twice daily. Discussed with pt. She prefers to continue current dose recommended by hematologist

## 2020-05-04 ENCOUNTER — TELEPHONE (OUTPATIENT)
Dept: OBSTETRICS AND GYNECOLOGY | Age: 25
End: 2020-05-04

## 2020-05-04 RX ORDER — LANCETS
1 EACH MISCELLANEOUS 4 TIMES DAILY
Qty: 120 EACH | Refills: 6 | Status: SHIPPED | OUTPATIENT
Start: 2020-05-04 | End: 2021-05-27

## 2020-05-04 NOTE — TELEPHONE ENCOUNTER
(Andrea pt) Pt was called in a prescription for glucometer and test strips but the pharmacy is missing the prescription for lancets and they are the fast click lancets. Pharmacy Is verified.     [phone]

## 2020-05-11 ENCOUNTER — ROUTINE PRENATAL (OUTPATIENT)
Dept: OBSTETRICS AND GYNECOLOGY | Age: 25
End: 2020-05-11

## 2020-05-11 VITALS — SYSTOLIC BLOOD PRESSURE: 120 MMHG | BODY MASS INDEX: 39.4 KG/M2 | WEIGHT: 233.6 LBS | DIASTOLIC BLOOD PRESSURE: 70 MMHG

## 2020-05-11 DIAGNOSIS — R63.8 INCREASED BMI: ICD-10-CM

## 2020-05-11 DIAGNOSIS — D68.51 FACTOR V LEIDEN (HCC): ICD-10-CM

## 2020-05-11 DIAGNOSIS — Z86.718 HISTORY OF DVT (DEEP VEIN THROMBOSIS): ICD-10-CM

## 2020-05-11 DIAGNOSIS — Z3A.31 31 WEEKS GESTATION OF PREGNANCY: Primary | ICD-10-CM

## 2020-05-11 PROCEDURE — 0502F SUBSEQUENT PRENATAL CARE: CPT | Performed by: OBSTETRICS & GYNECOLOGY

## 2020-05-11 NOTE — PROGRESS NOTES
Pt presents for routine f/u. She saw saw dietician and feels comfortable with testing and dietary recommendations. She denies regular ctx/vag bleeding or leaking fluid. She notes active fetal movement  O: glucose log: fasting 89 to 97. Values last 4 days all 95 or under. PP values all normal except 1 value at 121.    A/p: LGA with elevated one hour and increased BMI: MFM advised treating as if GDM. Glucose values are good overall. Minimally increased fasting values have improved last several days. Plan continue diet and monitoring. Pt has f/u growth u/s later this week. Will follow. M also advised BPP 34+ week     Factor V leiden: pt on lovenox 30 mg SC twice daily. She will change to heparin near term and re-start lovenox following delivery through atleast 6 weeks postpartum. Pt is followed by Hematologist as well.

## 2020-05-15 ENCOUNTER — ROUTINE PRENATAL (OUTPATIENT)
Dept: OBSTETRICS AND GYNECOLOGY | Age: 25
End: 2020-05-15

## 2020-05-15 ENCOUNTER — PROCEDURE VISIT (OUTPATIENT)
Dept: OBSTETRICS AND GYNECOLOGY | Age: 25
End: 2020-05-15

## 2020-05-15 ENCOUNTER — LAB (OUTPATIENT)
Dept: LAB | Facility: HOSPITAL | Age: 25
End: 2020-05-15

## 2020-05-15 ENCOUNTER — TELEMEDICINE (OUTPATIENT)
Dept: ONCOLOGY | Facility: CLINIC | Age: 25
End: 2020-05-15

## 2020-05-15 VITALS — WEIGHT: 233.8 LBS | SYSTOLIC BLOOD PRESSURE: 110 MMHG | BODY MASS INDEX: 39.43 KG/M2 | DIASTOLIC BLOOD PRESSURE: 72 MMHG

## 2020-05-15 DIAGNOSIS — R63.8 INCREASED BMI: ICD-10-CM

## 2020-05-15 DIAGNOSIS — Z36.89 ENCOUNTER FOR ULTRASOUND TO ASSESS INTERVAL GROWTH OF FETUS: ICD-10-CM

## 2020-05-15 DIAGNOSIS — D68.51 FACTOR V LEIDEN (HCC): Primary | ICD-10-CM

## 2020-05-15 DIAGNOSIS — D68.51 FACTOR V LEIDEN (HCC): ICD-10-CM

## 2020-05-15 DIAGNOSIS — Z3A.32 32 WEEKS GESTATION OF PREGNANCY: Primary | ICD-10-CM

## 2020-05-15 DIAGNOSIS — Z86.718 HISTORY OF DVT (DEEP VEIN THROMBOSIS): ICD-10-CM

## 2020-05-15 DIAGNOSIS — O24.410 DIET CONTROLLED GESTATIONAL DIABETES MELLITUS (GDM), ANTEPARTUM: ICD-10-CM

## 2020-05-15 DIAGNOSIS — Z86.718 HISTORY OF DVT (DEEP VEIN THROMBOSIS): Primary | ICD-10-CM

## 2020-05-15 LAB
ALBUMIN SERPL-MCNC: 3.5 G/DL (ref 3.5–5.2)
ALBUMIN/GLOB SERPL: 1.1 G/DL (ref 1.1–2.4)
ALP SERPL-CCNC: 73 U/L (ref 38–116)
ALT SERPL W P-5'-P-CCNC: 27 U/L (ref 0–33)
ANION GAP SERPL CALCULATED.3IONS-SCNC: 12.7 MMOL/L (ref 5–15)
AST SERPL-CCNC: 17 U/L (ref 0–32)
BASOPHILS # BLD AUTO: 0.02 10*3/MM3 (ref 0–0.2)
BASOPHILS NFR BLD AUTO: 0.3 % (ref 0–1.5)
BILIRUB SERPL-MCNC: 0.2 MG/DL (ref 0.2–1.2)
BUN BLD-MCNC: 9 MG/DL (ref 6–20)
BUN/CREAT SERPL: 15.3 (ref 7.3–30)
CALCIUM SPEC-SCNC: 9.4 MG/DL (ref 8.5–10.2)
CHLORIDE SERPL-SCNC: 102 MMOL/L (ref 98–107)
CLARITY, POC: CLEAR
CO2 SERPL-SCNC: 20.3 MMOL/L (ref 22–29)
COLOR UR: YELLOW
CREAT BLD-MCNC: 0.59 MG/DL (ref 0.6–1.1)
DEPRECATED RDW RBC AUTO: 42.5 FL (ref 37–54)
EOSINOPHIL # BLD AUTO: 0.11 10*3/MM3 (ref 0–0.4)
EOSINOPHIL NFR BLD AUTO: 1.4 % (ref 0.3–6.2)
ERYTHROCYTE [DISTWIDTH] IN BLOOD BY AUTOMATED COUNT: 12.7 % (ref 12.3–15.4)
GFR SERPL CREATININE-BSD FRML MDRD: 123 ML/MIN/1.73
GLOBULIN UR ELPH-MCNC: 3.3 GM/DL (ref 1.8–3.5)
GLUCOSE BLD-MCNC: 106 MG/DL (ref 74–124)
GLUCOSE UR STRIP-MCNC: NEGATIVE MG/DL
HCT VFR BLD AUTO: 36.6 % (ref 34–46.6)
HGB BLD-MCNC: 12.4 G/DL (ref 12–15.9)
IMM GRANULOCYTES # BLD AUTO: 0.08 10*3/MM3 (ref 0–0.05)
IMM GRANULOCYTES NFR BLD AUTO: 1 % (ref 0–0.5)
LYMPHOCYTES # BLD AUTO: 1.08 10*3/MM3 (ref 0.7–3.1)
LYMPHOCYTES NFR BLD AUTO: 14 % (ref 19.6–45.3)
MCH RBC QN AUTO: 31.5 PG (ref 26.6–33)
MCHC RBC AUTO-ENTMCNC: 33.9 G/DL (ref 31.5–35.7)
MCV RBC AUTO: 92.9 FL (ref 79–97)
MONOCYTES # BLD AUTO: 0.58 10*3/MM3 (ref 0.1–0.9)
MONOCYTES NFR BLD AUTO: 7.5 % (ref 5–12)
NEUTROPHILS # BLD AUTO: 5.84 10*3/MM3 (ref 1.7–7)
NEUTROPHILS NFR BLD AUTO: 75.8 % (ref 42.7–76)
NRBC BLD AUTO-RTO: 0 /100 WBC (ref 0–0.2)
PLATELET # BLD AUTO: 207 10*3/MM3 (ref 140–450)
PMV BLD AUTO: 9.7 FL (ref 6–12)
POTASSIUM BLD-SCNC: 4 MMOL/L (ref 3.5–4.7)
PROT SERPL-MCNC: 6.8 G/DL (ref 6.3–8)
PROT UR STRIP-MCNC: NEGATIVE MG/DL
RBC # BLD AUTO: 3.94 10*6/MM3 (ref 3.77–5.28)
SODIUM BLD-SCNC: 135 MMOL/L (ref 134–145)
WBC NRBC COR # BLD: 7.71 10*3/MM3 (ref 3.4–10.8)

## 2020-05-15 PROCEDURE — 76816 OB US FOLLOW-UP PER FETUS: CPT | Performed by: OBSTETRICS & GYNECOLOGY

## 2020-05-15 PROCEDURE — 36415 COLL VENOUS BLD VENIPUNCTURE: CPT

## 2020-05-15 PROCEDURE — 99213 OFFICE O/P EST LOW 20 MIN: CPT | Performed by: INTERNAL MEDICINE

## 2020-05-15 PROCEDURE — 0502F SUBSEQUENT PRENATAL CARE: CPT | Performed by: OBSTETRICS & GYNECOLOGY

## 2020-05-15 PROCEDURE — 80053 COMPREHEN METABOLIC PANEL: CPT

## 2020-05-15 PROCEDURE — 85025 COMPLETE CBC W/AUTO DIFF WBC: CPT

## 2020-05-15 NOTE — PROGRESS NOTES
Pt presents for u/s appt. She denies any issues today. She denies ctx/bleeding/leaking fluid. She notes active fetal movement  Glucose log reviewed : fasting values 90 to 100, only 2 values over 95 in last 8 days. PP values overall normal with 2 over 120 only minimally  U/s: efw 6 lb ( 89 %), ac at 99 %, BPP 8/8    LGA: glucose log reviewed and values are overall normal. Encouraged pt to follow diet and to continue regular exercise, plan rtc 1 to 2 weeks for u/s then weekly u/s through delivery. Discussed possible need for c/s due to macrosomia if accelerated growth continues and pt understands    Factor V leiden/hx DVT: pt on lovenox and will continue current dose twice daily through term then change to heparin

## 2020-05-15 NOTE — PROGRESS NOTES
[facility] GROUP OUTPATIENT FOLLOW UP CLINIC VISIT    REASON FOR FOLLOW-UP:    1.  History of postoperative left lower extremity DVT following arthroscopic left knee surgery.  Also provoked by oral estrogen contraceptive use.  2.  She is a heterozygote for the factor V Leiden R506Q mutation  3.  Intrauterine pregnancy currently at 28 weeks gestation, estimated due date 7/8/2020    HISTORY OF PRESENT ILLNESS:  Ines Padron is a 24 y.o. female who returns today for follow up of the above issue.      Pregnancy currently at 32 weeks and 2 days.  She is on Lovenox 30 mg twice daily.  She tolerates this very well with very minimal bruising at the injection sites at times.  No other bleeding.  Energy level is adequate.  Macrosomia has been noted and she is going to have weekly ultrasounds.    HEMATOLOGIC HISTORY:  She was initially seen back in June 2014.  At that time she was a 19-year-old female.  She was referred with a history of left lower extremity DVT following knee surgery.  She had an arthroscopic left knee procedure in May 2012 and had a subsequent surgery on 9/27/2012 by Dr. Dom Garcia with a cartilage implant in the left knee.  On 10/16/2012 she was diagnosed with a left popliteal and calf vein DVT.  This was an incompletely occlusive DVT.  She was on an estrogen containing oral contraceptive at the time.  She was started on warfarin which was discontinued as of 2/1/2013.  A venous duplex on 2/6/2013 showed an old calf vein thrombosis in the left leg.     She subsequently developed right knee pain.  She had a right knee arthroplasty with excision of a ganglion cyst and repair of the lateral meniscus on 5/12/2014.     She was initially seen here in the office on 6/2/2014 18.  She was prescribed prophylactic Xarelto at 10 mg daily.     She was found to be a heterozygote for the factor V Leiden mutation.  There was no evidence for a prothrombin gene mutation.  She stopped the Xarelto when her leg  came out of the brace.    ALLERGIES:  No Known Allergies    MEDICATIONS:  The medication list has been reviewed with the patient by the medical assistant, and the list has been updated in the electronic medical record, which I reviewed.  Medication dosages and frequencies were confirmed to be accurate.    REVIEW OF SYSTEMS:  PAIN:  See Vital Signs below.  GENERAL:  No fevers, chills, night sweats, or unintended weight loss.  SKIN:  No rashes or non-healing lesions.  HEME/LYMPH:  No abnormal bleeding.  No palpable lymphadenopathy.  Minimal bruising at injection sites at this point.    EYES:  No vision changes or diplopia.  ENT:  No sore throat or difficulty swallowing.  RESPIRATORY:  No cough, shortness of breath, hemoptysis, or wheezing.  CARDIOVASCULAR:  No chest pain, palpitations, orthopnea, or dyspnea on exertion.  GASTROINTESTINAL:    No nausea/vomiting/diarrhea  GENITOURINARY:  No dysuria or hematuria.  MUSCULOSKELETAL:  No joint pain, swelling, or erythema.  NEUROLOGIC:  No dizziness, loss of consciousness, or seizures.  PSYCHIATRIC:  No depression, anxiety, or mood changes.    There were no vitals filed for this visit.    PHYSICAL EXAMINATION:  GENERAL:  Well-developed well-nourished female; awake, alert and oriented, in no acute distress.  She was seen with a video visit today.    DIAGNOSTIC DATA:  Results for orders placed or performed in visit on 05/15/20   POC Urinalysis Dipstick   Result Value Ref Range    Color Yellow Yellow, Straw, Dark Yellow, Ambika    Clarity, UA Clear Clear    Glucose, UA Negative Negative, 1000 mg/dL (3+) mg/dL    Protein, POC Negative Negative mg/dL       IMAGING:  None reviewed    ASSESSMENT:  This is a 24 y.o. female with:  1. She has a history of left lower extremity DVT in the popliteal and calf vein that was provoked following a left knee arthroscopic procedure as well as concomitant oral estrogen contraceptive use.  In addition, she is a heterozygote for the factor V Leiden  mutation.      2.  Intrauterine pregnancy, currently at 32+2 weeks gestation, estimated due date 7/8/2020.    PLAN:  1.  Continue prophylactic anticoagulation with enoxaparin at 30 mg twice daily.  I would typically continue this dose until about 36 weeks which will be about 4 weeks from now.  She will go ahead and have her Lovenox refilled as she only has 4 days left of injections.  Typically at around 36 weeks I would transition to subcutaneous heparin with a dose of 10,000 units every 12 hours.  If there is some concern that she may need to deliver early due to fetal macrosomia we can certainly make this transition sooner.  I will communicate with Dr. Mendez.  Otherwise I will plan to see her back in about 4 weeks.

## 2020-05-26 ENCOUNTER — ROUTINE PRENATAL (OUTPATIENT)
Dept: OBSTETRICS AND GYNECOLOGY | Age: 25
End: 2020-05-26

## 2020-05-26 ENCOUNTER — PROCEDURE VISIT (OUTPATIENT)
Dept: OBSTETRICS AND GYNECOLOGY | Age: 25
End: 2020-05-26

## 2020-05-26 VITALS — WEIGHT: 236.6 LBS | SYSTOLIC BLOOD PRESSURE: 110 MMHG | BODY MASS INDEX: 39.9 KG/M2 | DIASTOLIC BLOOD PRESSURE: 70 MMHG

## 2020-05-26 DIAGNOSIS — Z3A.33 33 WEEKS GESTATION OF PREGNANCY: Primary | ICD-10-CM

## 2020-05-26 DIAGNOSIS — D68.51 FACTOR V LEIDEN (HCC): Primary | ICD-10-CM

## 2020-05-26 DIAGNOSIS — R63.8 INCREASED BMI: ICD-10-CM

## 2020-05-26 DIAGNOSIS — O24.410 DIET CONTROLLED GESTATIONAL DIABETES MELLITUS (GDM), ANTEPARTUM: ICD-10-CM

## 2020-05-26 PROCEDURE — 0502F SUBSEQUENT PRENATAL CARE: CPT | Performed by: OBSTETRICS & GYNECOLOGY

## 2020-05-26 PROCEDURE — 76819 FETAL BIOPHYS PROFIL W/O NST: CPT | Performed by: OBSTETRICS & GYNECOLOGY

## 2020-05-26 NOTE — PROGRESS NOTES
Pt comes in for routine visit. She denies any issues. She notes active fetal movement. Her glucose log is reviewed and values are good overall. Fasting values have been 88 to 97 but most less than 95. PP values have been overall very good. Pt had an increased level to 139 after a birthday treat but rest 100's to low 120's    BPP 8/8, natasha 14  A/p: glucose intolerance/LGA: glucose values overall normal with diet/monitoring, will continue. Plan weekly BPP and daily [facility]    Factor V leiden/hx DVT: pt on lovenox 30 mg sc twice daily. Hematologist plans to change her to heparin 10,000 units twice daily around 36 weeks. Advised pt to hold anticoagulant with labor symptoms and she understands

## 2020-05-27 ENCOUNTER — TELEPHONE (OUTPATIENT)
Dept: ONCOLOGY | Facility: HOSPITAL | Age: 25
End: 2020-05-27

## 2020-05-27 DIAGNOSIS — D68.51 FACTOR V LEIDEN (HCC): Primary | ICD-10-CM

## 2020-05-27 RX ORDER — SYRINGE W-NEEDLE,DISPOSAB,3 ML 25GX5/8"
1 SYRINGE, EMPTY DISPOSABLE MISCELLANEOUS EVERY 12 HOURS
Qty: 50 EACH | Refills: 1 | Status: SHIPPED | OUTPATIENT
Start: 2020-05-27 | End: 2021-05-27

## 2020-05-27 RX ORDER — HEPARIN SODIUM 10000 [USP'U]/ML
10000 INJECTION, SOLUTION INTRAVENOUS; SUBCUTANEOUS EVERY 12 HOURS
Qty: 30 ML | Refills: 2 | Status: SHIPPED | OUTPATIENT
Start: 2020-05-27 | End: 2020-06-28 | Stop reason: HOSPADM

## 2020-05-27 NOTE — TELEPHONE ENCOUNTER
Heparin and syringes escribed. Called pt to let her know, no answer, lvm.    ----- Message from Dajuan Ayoub MD sent at 5/27/2020  9:40 AM EDT -----  Regarding: FW: Prescription Question  Contact: [phone]  Yes.  Nothing in the notes and in my communications with Dr. Mendez indicate that we need to make the transition earlier than we had planned, but it's OK to go ahead and do it.  I have sent a message back to the patient as well.  Heparin 10,000 units SQ every 12 hours.  Thanks!  [facility]    ----- Message -----  From: Josi Candelaria RN  Sent: 5/27/2020   9:25 AM EDT  To: Dajuan Ayoub MD  Subject: FW: Prescription Question                        Okay to send in for Heparin? Please advise as to dosing and any other pertinent info. Thanks.    ----- Message -----  From: Ines Padron  Sent: 5/27/2020   9:18 AM EDT  To: [facility]  Subject: Prescription Question                            Good morning, Dr. Ayoub  I was just messaging you as I followed up with Dr. Mendez yesterday and she has advised that she would like me to be on Heparin before 35 weeks. I reach the 35 week bj next Wednesday so I was wondering if there was any way you could send the prescription in for me to get filled to begin using that instead of the Lovenox in case I go into early labor. I don't return for blood work and to have a video visit with you until 6/5 and 6/8 so I would be past that 35 week bj if we waited until an appointment.  Thanks, Ines.

## 2020-06-05 ENCOUNTER — PROCEDURE VISIT (OUTPATIENT)
Dept: OBSTETRICS AND GYNECOLOGY | Age: 25
End: 2020-06-05

## 2020-06-05 ENCOUNTER — ROUTINE PRENATAL (OUTPATIENT)
Dept: OBSTETRICS AND GYNECOLOGY | Age: 25
End: 2020-06-05

## 2020-06-05 ENCOUNTER — LAB (OUTPATIENT)
Dept: LAB | Facility: HOSPITAL | Age: 25
End: 2020-06-05

## 2020-06-05 ENCOUNTER — RESULTS ENCOUNTER (OUTPATIENT)
Dept: OBSTETRICS AND GYNECOLOGY | Age: 25
End: 2020-06-05

## 2020-06-05 VITALS — BODY MASS INDEX: 40.44 KG/M2 | SYSTOLIC BLOOD PRESSURE: 120 MMHG | WEIGHT: 239.8 LBS | DIASTOLIC BLOOD PRESSURE: 70 MMHG

## 2020-06-05 DIAGNOSIS — Z3A.35 35 WEEKS GESTATION OF PREGNANCY: ICD-10-CM

## 2020-06-05 DIAGNOSIS — Z3A.35 35 WEEKS GESTATION OF PREGNANCY: Primary | ICD-10-CM

## 2020-06-05 DIAGNOSIS — D68.51 FACTOR V LEIDEN (HCC): ICD-10-CM

## 2020-06-05 DIAGNOSIS — O23.40 GROUP B STREPTOCOCCUS URINARY TRACT INFECTION AFFECTING PREGNANCY, ANTEPARTUM: ICD-10-CM

## 2020-06-05 DIAGNOSIS — R63.8 INCREASED BMI: ICD-10-CM

## 2020-06-05 DIAGNOSIS — O12.00 SWELLING OF LOWER EXTREMITY DURING PREGNANCY, ANTEPARTUM: ICD-10-CM

## 2020-06-05 DIAGNOSIS — Z86.718 HISTORY OF DVT (DEEP VEIN THROMBOSIS): ICD-10-CM

## 2020-06-05 DIAGNOSIS — B95.1 GROUP B STREPTOCOCCUS URINARY TRACT INFECTION AFFECTING PREGNANCY, ANTEPARTUM: ICD-10-CM

## 2020-06-05 DIAGNOSIS — O24.410 DIET CONTROLLED GESTATIONAL DIABETES MELLITUS (GDM), ANTEPARTUM: ICD-10-CM

## 2020-06-05 LAB
ALBUMIN SERPL-MCNC: 3.6 G/DL (ref 3.5–5.2)
ALBUMIN/GLOB SERPL: 1 G/DL (ref 1.1–2.4)
ALP SERPL-CCNC: 88 U/L (ref 38–116)
ALT SERPL W P-5'-P-CCNC: 18 U/L (ref 0–33)
ANION GAP SERPL CALCULATED.3IONS-SCNC: 15.5 MMOL/L (ref 5–15)
AST SERPL-CCNC: 16 U/L (ref 0–32)
BASOPHILS # BLD AUTO: 0.02 10*3/MM3 (ref 0–0.2)
BASOPHILS NFR BLD AUTO: 0.3 % (ref 0–1.5)
BILIRUB SERPL-MCNC: 0.2 MG/DL (ref 0.2–1.2)
BUN BLD-MCNC: 8 MG/DL (ref 6–20)
BUN/CREAT SERPL: 13.8 (ref 7.3–30)
CALCIUM SPEC-SCNC: 9.6 MG/DL (ref 8.5–10.2)
CHLORIDE SERPL-SCNC: 101 MMOL/L (ref 98–107)
CLARITY, POC: CLEAR
CO2 SERPL-SCNC: 19.5 MMOL/L (ref 22–29)
COLOR UR: YELLOW
CREAT BLD-MCNC: 0.58 MG/DL (ref 0.6–1.1)
DEPRECATED RDW RBC AUTO: 44.4 FL (ref 37–54)
EOSINOPHIL # BLD AUTO: 0.09 10*3/MM3 (ref 0–0.4)
EOSINOPHIL NFR BLD AUTO: 1.2 % (ref 0.3–6.2)
ERYTHROCYTE [DISTWIDTH] IN BLOOD BY AUTOMATED COUNT: 13.2 % (ref 12.3–15.4)
GFR SERPL CREATININE-BSD FRML MDRD: 125 ML/MIN/1.73
GLOBULIN UR ELPH-MCNC: 3.6 GM/DL (ref 1.8–3.5)
GLUCOSE BLD-MCNC: 98 MG/DL (ref 74–124)
GLUCOSE UR STRIP-MCNC: NEGATIVE MG/DL
HCT VFR BLD AUTO: 37.6 % (ref 34–46.6)
HGB BLD-MCNC: 12.8 G/DL (ref 12–15.9)
IMM GRANULOCYTES # BLD AUTO: 0.15 10*3/MM3 (ref 0–0.05)
IMM GRANULOCYTES NFR BLD AUTO: 2 % (ref 0–0.5)
LYMPHOCYTES # BLD AUTO: 1.2 10*3/MM3 (ref 0.7–3.1)
LYMPHOCYTES NFR BLD AUTO: 15.7 % (ref 19.6–45.3)
MCH RBC QN AUTO: 31.8 PG (ref 26.6–33)
MCHC RBC AUTO-ENTMCNC: 34 G/DL (ref 31.5–35.7)
MCV RBC AUTO: 93.3 FL (ref 79–97)
MONOCYTES # BLD AUTO: 0.81 10*3/MM3 (ref 0.1–0.9)
MONOCYTES NFR BLD AUTO: 10.6 % (ref 5–12)
NEUTROPHILS # BLD AUTO: 5.37 10*3/MM3 (ref 1.7–7)
NEUTROPHILS NFR BLD AUTO: 70.2 % (ref 42.7–76)
NRBC BLD AUTO-RTO: 0 /100 WBC (ref 0–0.2)
PLATELET # BLD AUTO: 211 10*3/MM3 (ref 140–450)
PMV BLD AUTO: 9.9 FL (ref 6–12)
POTASSIUM BLD-SCNC: 4 MMOL/L (ref 3.5–4.7)
PROT SERPL-MCNC: 7.2 G/DL (ref 6.3–8)
PROT UR STRIP-MCNC: ABNORMAL MG/DL
RBC # BLD AUTO: 4.03 10*6/MM3 (ref 3.77–5.28)
SODIUM BLD-SCNC: 136 MMOL/L (ref 134–145)
WBC NRBC COR # BLD: 7.64 10*3/MM3 (ref 3.4–10.8)

## 2020-06-05 PROCEDURE — 0502F SUBSEQUENT PRENATAL CARE: CPT | Performed by: OBSTETRICS & GYNECOLOGY

## 2020-06-05 PROCEDURE — 76819 FETAL BIOPHYS PROFIL W/O NST: CPT | Performed by: OBSTETRICS & GYNECOLOGY

## 2020-06-05 PROCEDURE — 80053 COMPREHEN METABOLIC PANEL: CPT

## 2020-06-05 PROCEDURE — 85025 COMPLETE CBC W/AUTO DIFF WBC: CPT

## 2020-06-05 PROCEDURE — 76816 OB US FOLLOW-UP PER FETUS: CPT | Performed by: OBSTETRICS & GYNECOLOGY

## 2020-06-05 PROCEDURE — 36415 COLL VENOUS BLD VENIPUNCTURE: CPT

## 2020-06-05 NOTE — PROGRESS NOTES
Pt presents for routine visit. She denies any headaches, vision changes or abdominal pain. She notes lower extremity swelling. She notes active fetal movement. She reports she changed to heparin 10,000 units twice daily this week and stopped lovenox  Glucose log reviewed: fasting values 88-97, pp [id number] to 138, only 2 values over 120 in last week  U/S: efw 7 lb 1 oz, AC > 99 %, natasha 13.8, BPP 8/8  Ext: 1+ edema, 2+ DTR's  A/p: LGA: glucose values are overall good. Discussed delivery options and reviewed risks of shoulder dystocia with LGA and glucose intolerance. Pt understands risks include permanent neurologic injury and death. Also discussed risks of  which include hemorrhage, transfusion, infection, damage to internal organs, anesthetic complications, DVT/PE, need for future c/s and death. Patient will consider. Discussed that trial of labor would be reasonable    Edema noted: BP is stable. MD repeated X 2 and values were 120/75 and 120/80. Reviewed preeclamptic symptoms and advised pt to go to L&D if noted. Also advised her to start monitoring BP daily. Her sister is an RN and can monitor for pt. Recommend pt return for BP check in 4 days. Will check CBC,CMP today and start 24 hour urine for pt to turn in early next week

## 2020-06-06 LAB
ALBUMIN SERPL-MCNC: 3.8 G/DL (ref 3.5–5.2)
ALBUMIN/GLOB SERPL: 1.1 G/DL
ALP SERPL-CCNC: 94 U/L (ref 39–117)
ALT SERPL-CCNC: 20 U/L (ref 1–33)
AST SERPL-CCNC: 15 U/L (ref 1–32)
BASOPHILS # BLD AUTO: 0.03 10*3/MM3 (ref 0–0.2)
BASOPHILS NFR BLD AUTO: 0.4 % (ref 0–1.5)
BILIRUB SERPL-MCNC: 0.2 MG/DL (ref 0.2–1.2)
BUN SERPL-MCNC: 8 MG/DL (ref 6–20)
BUN/CREAT SERPL: 11.8 (ref 7–25)
CALCIUM SERPL-MCNC: 9.8 MG/DL (ref 8.6–10.5)
CHLORIDE SERPL-SCNC: 101 MMOL/L (ref 98–107)
CO2 SERPL-SCNC: 24.3 MMOL/L (ref 22–29)
CREAT SERPL-MCNC: 0.68 MG/DL (ref 0.57–1)
EOSINOPHIL # BLD AUTO: 0.09 10*3/MM3 (ref 0–0.4)
EOSINOPHIL NFR BLD AUTO: 1.3 % (ref 0.3–6.2)
ERYTHROCYTE [DISTWIDTH] IN BLOOD BY AUTOMATED COUNT: 13 % (ref 12.3–15.4)
GLOBULIN SER CALC-MCNC: 3.4 GM/DL
GLUCOSE SERPL-MCNC: 91 MG/DL (ref 65–99)
HCT VFR BLD AUTO: 37.8 % (ref 34–46.6)
HGB BLD-MCNC: 13.1 G/DL (ref 12–15.9)
IMM GRANULOCYTES # BLD AUTO: 0.11 10*3/MM3 (ref 0–0.05)
IMM GRANULOCYTES NFR BLD AUTO: 1.6 % (ref 0–0.5)
LYMPHOCYTES # BLD AUTO: 1.09 10*3/MM3 (ref 0.7–3.1)
LYMPHOCYTES NFR BLD AUTO: 15.5 % (ref 19.6–45.3)
MCH RBC QN AUTO: 32.1 PG (ref 26.6–33)
MCHC RBC AUTO-ENTMCNC: 34.7 G/DL (ref 31.5–35.7)
MCV RBC AUTO: 92.6 FL (ref 79–97)
MONOCYTES # BLD AUTO: 0.75 10*3/MM3 (ref 0.1–0.9)
MONOCYTES NFR BLD AUTO: 10.7 % (ref 5–12)
NEUTROPHILS # BLD AUTO: 4.94 10*3/MM3 (ref 1.7–7)
NEUTROPHILS NFR BLD AUTO: 70.5 % (ref 42.7–76)
NRBC BLD AUTO-RTO: 0 /100 WBC (ref 0–0.2)
PLATELET # BLD AUTO: 229 10*3/MM3 (ref 140–450)
POTASSIUM SERPL-SCNC: 4.4 MMOL/L (ref 3.5–5.2)
PROT SERPL-MCNC: 7.2 G/DL (ref 6–8.5)
RBC # BLD AUTO: 4.08 10*6/MM3 (ref 3.77–5.28)
SODIUM SERPL-SCNC: 135 MMOL/L (ref 136–145)
WBC # BLD AUTO: 7.01 10*3/MM3 (ref 3.4–10.8)

## 2020-06-08 ENCOUNTER — TELEMEDICINE (OUTPATIENT)
Dept: ONCOLOGY | Facility: CLINIC | Age: 25
End: 2020-06-08

## 2020-06-08 ENCOUNTER — TELEPHONE (OUTPATIENT)
Dept: OBSTETRICS AND GYNECOLOGY | Age: 25
End: 2020-06-08

## 2020-06-08 DIAGNOSIS — D68.51 FACTOR V LEIDEN (HCC): Primary | ICD-10-CM

## 2020-06-08 DIAGNOSIS — O12.00 SWELLING OF LOWER EXTREMITY DURING PREGNANCY, ANTEPARTUM: Primary | ICD-10-CM

## 2020-06-08 DIAGNOSIS — Z86.718 HISTORY OF DVT (DEEP VEIN THROMBOSIS): ICD-10-CM

## 2020-06-08 PROCEDURE — 99213 OFFICE O/P EST LOW 20 MIN: CPT | Performed by: INTERNAL MEDICINE

## 2020-06-08 NOTE — PROGRESS NOTES
[facility] GROUP OUTPATIENT FOLLOW UP CLINIC VISIT    REASON FOR FOLLOW-UP:    1.  History of postoperative left lower extremity DVT following arthroscopic left knee surgery.  Also provoked by oral estrogen contraceptive use.  2.  She is a heterozygote for the factor V Leiden R506Q mutation  3.  Intrauterine pregnancy currently at nearly 36 weeks gestation, estimated due date 7/8/2020    HISTORY OF PRESENT ILLNESS:  Ines Padron is a 25 y.o. female who returns today for follow up of the above issue.      At around 35 weeks she made the transition to subcutaneous heparin from Lovenox.  She will be 36 weeks gestation on Wednesday.  She notes a little bit more pain with the heparin injections compared to the Lovenox injections.  She denies any bleeding.  Otherwise she is doing well.  She follows up with OB/GYN weekly.  No definite delivery date is scheduled at this point.    HEMATOLOGIC HISTORY:  She was initially seen back in June 2014.  At that time she was a 19-year-old female.  She was referred with a history of left lower extremity DVT following knee surgery.  She had an arthroscopic left knee procedure in May 2012 and had a subsequent surgery on 9/27/2012 by Dr. Dom Garcia with a cartilage implant in the left knee.  On 10/16/2012 she was diagnosed with a left popliteal and calf vein DVT.  This was an incompletely occlusive DVT.  She was on an estrogen containing oral contraceptive at the time.  She was started on warfarin which was discontinued as of 2/1/2013.  A venous duplex on 2/6/2013 showed an old calf vein thrombosis in the left leg.     She subsequently developed right knee pain.  She had a right knee arthroplasty with excision of a ganglion cyst and repair of the lateral meniscus on 5/12/2014.     She was initially seen here in the office on 6/2/2014 18.  She was prescribed prophylactic Xarelto at 10 mg daily.     She was found to be a heterozygote for the factor V Leiden mutation.  There  was no evidence for a prothrombin gene mutation.  She stopped the Xarelto when her leg came out of the brace.    ALLERGIES:  No Known Allergies    MEDICATIONS:  The medication list has been reviewed with the patient by the medical assistant, and the list has been updated in the electronic medical record, which I reviewed.  Medication dosages and frequencies were confirmed to be accurate.    REVIEW OF SYSTEMS:  PAIN:  See Vital Signs below.  GENERAL:  No fevers, chills, night sweats, or unintended weight loss.  Mild pain with injections  SKIN:  No rashes or non-healing lesions.  HEME/LYMPH:  No abnormal bleeding.  No palpable lymphadenopathy.  Minimal bruising at injection sites at this point.    EYES:  No vision changes or diplopia.  ENT:  No sore throat or difficulty swallowing.  RESPIRATORY:  No cough, shortness of breath, hemoptysis, or wheezing.  CARDIOVASCULAR:  No chest pain, palpitations, orthopnea, or dyspnea on exertion.  GASTROINTESTINAL:    No nausea/vomiting/diarrhea  GENITOURINARY:  No dysuria or hematuria.  MUSCULOSKELETAL:  No joint pain, swelling, or erythema.  NEUROLOGIC:  No dizziness, loss of consciousness, or seizures.  PSYCHIATRIC:  No depression, anxiety, or mood changes.    There were no vitals filed for this visit.    PHYSICAL EXAMINATION:  GENERAL:  Well-developed well-nourished female; awake, alert and oriented, in no acute distress.  She was seen with a video visit today.    DIAGNOSTIC DATA:  Results for orders placed or performed in visit on 06/05/20   Comprehensive Metabolic Panel   Result Value Ref Range    Glucose 98 74 - 124 mg/dL    BUN 8 6 - 20 mg/dL    Creatinine 0.58 (L) 0.60 - 1.10 mg/dL    Sodium 136 134 - 145 mmol/L    Potassium 4.0 3.5 - 4.7 mmol/L    Chloride 101 98 - 107 mmol/L    CO2 19.5 (L) 22.0 - 29.0 mmol/L    Calcium 9.6 8.5 - 10.2 mg/dL    Total Protein 7.2 6.3 - 8.0 g/dL    Albumin 3.60 3.50 - 5.20 g/dL    ALT (SGPT) 18 0 - 33 U/L    AST (SGOT) 16 0 - 32 U/L     Alkaline Phosphatase 88 38 - 116 U/L    Total Bilirubin 0.2 0.2 - 1.2 mg/dL    eGFR Non African Amer 125 >60 mL/min/1.73    Globulin 3.6 (H) 1.8 - 3.5 gm/dL    A/G Ratio 1.0 (L) 1.1 - 2.4 g/dL    BUN/Creatinine Ratio 13.8 7.3 - 30.0    Anion Gap 15.5 (H) 5.0 - 15.0 mmol/L   CBC Auto Differential   Result Value Ref Range    WBC 7.64 3.40 - 10.80 10*3/mm3    RBC 4.03 3.77 - 5.28 10*6/mm3    Hemoglobin 12.8 12.0 - 15.9 g/dL    Hematocrit 37.6 34.0 - 46.6 %    MCV 93.3 79.0 - 97.0 fL    MCH 31.8 26.6 - 33.0 pg    MCHC 34.0 31.5 - 35.7 g/dL    RDW 13.2 12.3 - 15.4 %    RDW-SD 44.4 37.0 - 54.0 fl    MPV 9.9 6.0 - 12.0 fL    Platelets 211 140 - 450 10*3/mm3    Neutrophil % 70.2 42.7 - 76.0 %    Lymphocyte % 15.7 (L) 19.6 - 45.3 %    Monocyte % 10.6 5.0 - 12.0 %    Eosinophil % 1.2 0.3 - 6.2 %    Basophil % 0.3 0.0 - 1.5 %    Immature Grans % 2.0 (H) 0.0 - 0.5 %    Neutrophils, Absolute 5.37 1.70 - 7.00 10*3/mm3    Lymphocytes, Absolute 1.20 0.70 - 3.10 10*3/mm3    Monocytes, Absolute 0.81 0.10 - 0.90 10*3/mm3    Eosinophils, Absolute 0.09 0.00 - 0.40 10*3/mm3    Basophils, Absolute 0.02 0.00 - 0.20 10*3/mm3    Immature Grans, Absolute 0.15 (H) 0.00 - 0.05 10*3/mm3    nRBC 0.0 0.0 - 0.2 /100 WBC       IMAGING:  None reviewed    ASSESSMENT:  This is a 25 y.o. female with:  1. She has a history of left lower extremity DVT in the popliteal and calf vein that was provoked following a left knee arthroscopic procedure as well as concomitant oral estrogen contraceptive use.  In addition, she is a heterozygote for the factor V Leiden mutation.      2.  Intrauterine pregnancy, currently at 32+2 weeks gestation, estimated due date 7/8/2020.    PLAN:  1.  Prior to 35 weeks we did make a transition to subcutaneous heparin from subcutaneous Lovenox based on what she understood advised from OB/GYN was.  She now continues subcutaneous heparin injections 10,000 units every 12 hours.  She is doing well with no bleeding.  She does report a  little bit of worsening pain at the injection sites.  She will continue this until around the time of delivery.  She knows to hold heparin at any signs of labor.  After delivery, whenever appropriate, she will resume subcutaneous Lovenox injections and continue these for a couple of months after delivery.  I will not schedule any routine appointments with us prior to delivery at this point.  She knows how to reach us and we remain available if appropriate.  We can see her postpartum if necessary.  Otherwise, I will schedule a follow-up video visit appointment about a month after she delivers.

## 2020-06-08 NOTE — TELEPHONE ENCOUNTER
----- Message from Becky Mendez MD sent at 6/8/2020  7:57 AM EDT -----  Please call Ines, her AST/ALT and platelets were normal

## 2020-06-09 ENCOUNTER — ROUTINE PRENATAL (OUTPATIENT)
Dept: OBSTETRICS AND GYNECOLOGY | Age: 25
End: 2020-06-09

## 2020-06-09 VITALS — SYSTOLIC BLOOD PRESSURE: 110 MMHG | BODY MASS INDEX: 40.27 KG/M2 | DIASTOLIC BLOOD PRESSURE: 60 MMHG | WEIGHT: 238.8 LBS

## 2020-06-09 DIAGNOSIS — Z3A.35 35 WEEKS GESTATION OF PREGNANCY: Primary | ICD-10-CM

## 2020-06-09 LAB
CLARITY, POC: CLEAR
COLOR UR: YELLOW
GLUCOSE UR STRIP-MCNC: NEGATIVE MG/DL
PROT 24H UR-MRATE: 192 MG/24HOURS (ref 0–150)
PROT UR STRIP-MCNC: NEGATIVE MG/DL
PROT UR-MCNC: 12 MG/DL

## 2020-06-09 PROCEDURE — 0502F SUBSEQUENT PRENATAL CARE: CPT | Performed by: OBSTETRICS & GYNECOLOGY

## 2020-06-09 NOTE — PROGRESS NOTES
Pt presents for routine visit. She denies headaches, vision changes or abdominal pain. Her sister has been monitoring her BP's and her values have been normal 110's over 70's.     24 hour urine: 192 mg of protein  Ext: trace edema    A/p: BP's remain stable, labs and BP values do not support preeclampsia. Will continue home BP monitoring and preeclamptic precautions. Continue daily fmc's. F/u 3 days for routine visit and BPP    Factor V leiden/hx DVT: pt on heparin 10,000 units twice daily    Glucose intolerance: pt verbally reports normal values over weekend

## 2020-06-12 ENCOUNTER — ROUTINE PRENATAL (OUTPATIENT)
Dept: OBSTETRICS AND GYNECOLOGY | Age: 25
End: 2020-06-12

## 2020-06-12 ENCOUNTER — PROCEDURE VISIT (OUTPATIENT)
Dept: OBSTETRICS AND GYNECOLOGY | Age: 25
End: 2020-06-12

## 2020-06-12 VITALS — SYSTOLIC BLOOD PRESSURE: 110 MMHG | WEIGHT: 238 LBS | BODY MASS INDEX: 40.14 KG/M2 | DIASTOLIC BLOOD PRESSURE: 70 MMHG

## 2020-06-12 DIAGNOSIS — D68.51 FACTOR V LEIDEN (HCC): ICD-10-CM

## 2020-06-12 DIAGNOSIS — Z86.718 HISTORY OF DVT (DEEP VEIN THROMBOSIS): ICD-10-CM

## 2020-06-12 DIAGNOSIS — O23.40 GROUP B STREPTOCOCCUS URINARY TRACT INFECTION AFFECTING PREGNANCY, ANTEPARTUM: ICD-10-CM

## 2020-06-12 DIAGNOSIS — Z3A.36 36 WEEKS GESTATION OF PREGNANCY: Primary | ICD-10-CM

## 2020-06-12 DIAGNOSIS — D68.51 FACTOR V LEIDEN (HCC): Primary | ICD-10-CM

## 2020-06-12 DIAGNOSIS — O24.410 DIET CONTROLLED GESTATIONAL DIABETES MELLITUS (GDM), ANTEPARTUM: ICD-10-CM

## 2020-06-12 DIAGNOSIS — B95.1 GROUP B STREPTOCOCCUS URINARY TRACT INFECTION AFFECTING PREGNANCY, ANTEPARTUM: ICD-10-CM

## 2020-06-12 DIAGNOSIS — R63.8 INCREASED BMI: ICD-10-CM

## 2020-06-12 PROCEDURE — 0502F SUBSEQUENT PRENATAL CARE: CPT | Performed by: OBSTETRICS & GYNECOLOGY

## 2020-06-12 PROCEDURE — 76819 FETAL BIOPHYS PROFIL W/O NST: CPT | Performed by: OBSTETRICS & GYNECOLOGY

## 2020-06-12 NOTE — PROGRESS NOTES
Pt presents for routine f/u. She notes active fetal movement and denies any issues. She is monitoring BP and reports all values normal. She denies headache, vision changes or abdominal pain. She denies regular ctx, vag bleeding or leaking fluid    O: u/s: BPP 8/8, natasha normal at 14.6  Ext: 1+ edema, 2+ DTR's    A/p: Factor V leiden/hx DVT: pt is now taking heparin 10,000 units twice daily; she is counseled to hold medication for labor symptoms    Edema: BP remains stable, continue home monitoring and preeclamptic warnings    Glucose intolerance: pt is monitoring values at home. Fasting values are 87-97, pp values 88 to 129. She has only rarely elevated values    LGA: continue weekly BPP's. Plan repeat growth next week. Pt desires vaginal delivery and understands risks of shoulder dystocia and CPD with LGA infant.    Discussed delivery timing. Recommended IOL at 38 weeks due to GDM and factor V leiden on anticoagulant therapy. Pt agrees. She understands risks of prematurity. Discussed cervidil and pt agrees to this. Ordered IOL pm on 6/23/20

## 2020-06-19 ENCOUNTER — ROUTINE PRENATAL (OUTPATIENT)
Dept: OBSTETRICS AND GYNECOLOGY | Age: 25
End: 2020-06-19

## 2020-06-19 ENCOUNTER — PROCEDURE VISIT (OUTPATIENT)
Dept: OBSTETRICS AND GYNECOLOGY | Age: 25
End: 2020-06-19

## 2020-06-19 VITALS — BODY MASS INDEX: 39.97 KG/M2 | WEIGHT: 237 LBS | DIASTOLIC BLOOD PRESSURE: 72 MMHG | SYSTOLIC BLOOD PRESSURE: 104 MMHG

## 2020-06-19 DIAGNOSIS — B95.1 GROUP B STREPTOCOCCUS URINARY TRACT INFECTION AFFECTING PREGNANCY, ANTEPARTUM: ICD-10-CM

## 2020-06-19 DIAGNOSIS — O24.410 DIET CONTROLLED GESTATIONAL DIABETES MELLITUS (GDM), ANTEPARTUM: ICD-10-CM

## 2020-06-19 DIAGNOSIS — Z3A.37 37 WEEKS GESTATION OF PREGNANCY: ICD-10-CM

## 2020-06-19 DIAGNOSIS — Z34.03 ENCOUNTER FOR SUPERVISION OF NORMAL FIRST PREGNANCY IN THIRD TRIMESTER: Primary | ICD-10-CM

## 2020-06-19 DIAGNOSIS — O23.40 GROUP B STREPTOCOCCUS URINARY TRACT INFECTION AFFECTING PREGNANCY, ANTEPARTUM: ICD-10-CM

## 2020-06-19 DIAGNOSIS — R63.8 INCREASED BMI: ICD-10-CM

## 2020-06-19 DIAGNOSIS — D68.51 FACTOR V LEIDEN (HCC): ICD-10-CM

## 2020-06-19 DIAGNOSIS — D68.51 FACTOR V LEIDEN (HCC): Primary | ICD-10-CM

## 2020-06-19 DIAGNOSIS — Z13.89 SCREENING FOR HEMATURIA OR PROTEINURIA: ICD-10-CM

## 2020-06-19 LAB
GLUCOSE UR STRIP-MCNC: NEGATIVE MG/DL
PROT UR STRIP-MCNC: NEGATIVE MG/DL

## 2020-06-19 PROCEDURE — 0502F SUBSEQUENT PRENATAL CARE: CPT | Performed by: OBSTETRICS & GYNECOLOGY

## 2020-06-19 PROCEDURE — 76819 FETAL BIOPHYS PROFIL W/O NST: CPT | Performed by: OBSTETRICS & GYNECOLOGY

## 2020-06-19 NOTE — PROGRESS NOTES
Pt presents for routine visit. She denies any regular ctx, vag bleeding or leaking fluid. She notes active fetal movement.  Glucose log: fasting 92 to 98, pp values 82 to 149. Only 1 pp was elevated.  O: u/s: efw 7 lb 10 oz ( 76 %), natasha is adequate  BPP 8/8  Ext: trace edema. No cords or tenderness  A/p: Factor V leiden: pt on heparin 10,000 units twice daily. Discussed options and pt would like to proceed with IOL at 38 weeks and is scheduled.     GDM: pt passed 3 hour but MFM felt diagnosis was likely. She has had good control with diet management.     Increased BMI: BPP today 8/8, continue daily fmc's through delivery    Edema: BP remains normal. Discussed preeclamptic warnings and pt is monitoring BP's at home. They have been normal.    LGA: current efw is appropriate. Pt was offered primary c/s to avoid shoulder dystocia and permanent injury to infant. She considered and declines primary c/s at this time. She desires labor induction.

## 2020-06-23 ENCOUNTER — PREP FOR SURGERY (OUTPATIENT)
Dept: OTHER | Facility: HOSPITAL | Age: 25
End: 2020-06-23

## 2020-06-23 ENCOUNTER — ANESTHESIA EVENT (OUTPATIENT)
Dept: LABOR AND DELIVERY | Facility: HOSPITAL | Age: 25
End: 2020-06-23

## 2020-06-23 ENCOUNTER — HOSPITAL ENCOUNTER (OUTPATIENT)
Dept: LABOR AND DELIVERY | Facility: HOSPITAL | Age: 25
Discharge: HOME | End: 2020-06-23

## 2020-06-23 ENCOUNTER — HOSPITAL ENCOUNTER (INPATIENT)
Facility: HOSPITAL | Age: 25
LOS: 5 days | Discharge: HOME | End: 2020-06-28
Attending: OBSTETRICS & GYNECOLOGY | Admitting: OBSTETRICS & GYNECOLOGY

## 2020-06-23 ENCOUNTER — ANESTHESIA (OUTPATIENT)
Dept: LABOR AND DELIVERY | Facility: HOSPITAL | Age: 25
End: 2020-06-23

## 2020-06-23 DIAGNOSIS — Z86.718 HISTORY OF DVT (DEEP VEIN THROMBOSIS): Primary | ICD-10-CM

## 2020-06-23 DIAGNOSIS — O24.410 DIET CONTROLLED GESTATIONAL DIABETES MELLITUS (GDM), ANTEPARTUM: ICD-10-CM

## 2020-06-23 DIAGNOSIS — O23.40 GROUP B STREPTOCOCCUS URINARY TRACT INFECTION AFFECTING PREGNANCY, ANTEPARTUM: ICD-10-CM

## 2020-06-23 DIAGNOSIS — B95.1 GROUP B STREPTOCOCCUS URINARY TRACT INFECTION AFFECTING PREGNANCY, ANTEPARTUM: ICD-10-CM

## 2020-06-23 DIAGNOSIS — D68.51 FACTOR V LEIDEN (HCC): ICD-10-CM

## 2020-06-23 DIAGNOSIS — R63.8 INCREASED BMI: ICD-10-CM

## 2020-06-23 DIAGNOSIS — D68.51 FACTOR V LEIDEN CARRIER (HCC): ICD-10-CM

## 2020-06-23 DIAGNOSIS — O24.410 DIET CONTROLLED GESTATIONAL DIABETES MELLITUS (GDM), ANTEPARTUM: Primary | ICD-10-CM

## 2020-06-23 LAB
ABO GROUP BLD: NORMAL
ALBUMIN SERPL-MCNC: 4 G/DL (ref 3.5–5.2)
ALBUMIN/GLOB SERPL: 1.1 G/DL
ALP SERPL-CCNC: 107 U/L (ref 39–117)
ALT SERPL W P-5'-P-CCNC: 13 U/L (ref 1–33)
ANION GAP SERPL CALCULATED.3IONS-SCNC: 15.1 MMOL/L (ref 5–15)
APTT PPP: 27.4 SECONDS (ref 22.7–35.4)
AST SERPL-CCNC: 13 U/L (ref 1–32)
BILIRUB SERPL-MCNC: 0.2 MG/DL (ref 0.2–1.2)
BLD GP AB SCN SERPL QL: NEGATIVE
BUN BLD-MCNC: 13 MG/DL (ref 6–20)
BUN/CREAT SERPL: 15.5 (ref 7–25)
CALCIUM SPEC-SCNC: 10 MG/DL (ref 8.6–10.5)
CHLORIDE SERPL-SCNC: 98 MMOL/L (ref 98–107)
CO2 SERPL-SCNC: 17.9 MMOL/L (ref 22–29)
CREAT BLD-MCNC: 0.84 MG/DL (ref 0.57–1)
DEPRECATED RDW RBC AUTO: 44.4 FL (ref 37–54)
ERYTHROCYTE [DISTWIDTH] IN BLOOD BY AUTOMATED COUNT: 13.3 % (ref 12.3–15.4)
GFR SERPL CREATININE-BSD FRML MDRD: 81 ML/MIN/1.73
GLOBULIN UR ELPH-MCNC: 3.5 GM/DL
GLUCOSE BLD-MCNC: 99 MG/DL (ref 65–99)
GLUCOSE BLDC GLUCOMTR-MCNC: 94 MG/DL (ref 70–130)
HCT VFR BLD AUTO: 37 % (ref 34–46.6)
HGB BLD-MCNC: 12.9 G/DL (ref 12–15.9)
INR PPP: 0.9 (ref 0.9–1.1)
MCH RBC QN AUTO: 31.9 PG (ref 26.6–33)
MCHC RBC AUTO-ENTMCNC: 34.9 G/DL (ref 31.5–35.7)
MCV RBC AUTO: 91.6 FL (ref 79–97)
PLATELET # BLD AUTO: 222 10*3/MM3 (ref 140–450)
PMV BLD AUTO: 10.5 FL (ref 6–12)
POTASSIUM BLD-SCNC: 3.8 MMOL/L (ref 3.5–5.2)
PROT SERPL-MCNC: 7.5 G/DL (ref 6–8.5)
PROTHROMBIN TIME: 11.8 SECONDS (ref 11.7–14.2)
RBC # BLD AUTO: 4.04 10*6/MM3 (ref 3.77–5.28)
RH BLD: POSITIVE
SARS-COV-2 RDRP RESP QL NAA+PROBE: NOT DETECTED
SODIUM BLD-SCNC: 131 MMOL/L (ref 136–145)
T&S EXPIRATION DATE: NORMAL
WBC NRBC COR # BLD: 8.1 10*3/MM3 (ref 3.4–10.8)

## 2020-06-23 PROCEDURE — 85730 THROMBOPLASTIN TIME PARTIAL: CPT | Performed by: OBSTETRICS & GYNECOLOGY

## 2020-06-23 PROCEDURE — 86901 BLOOD TYPING SEROLOGIC RH(D): CPT | Performed by: OBSTETRICS & GYNECOLOGY

## 2020-06-23 PROCEDURE — 86850 RBC ANTIBODY SCREEN: CPT | Performed by: OBSTETRICS & GYNECOLOGY

## 2020-06-23 PROCEDURE — 85027 COMPLETE CBC AUTOMATED: CPT | Performed by: OBSTETRICS & GYNECOLOGY

## 2020-06-23 PROCEDURE — 3E033VJ INTRODUCTION OF OTHER HORMONE INTO PERIPHERAL VEIN, PERCUTANEOUS APPROACH: ICD-10-PCS | Performed by: OBSTETRICS & GYNECOLOGY

## 2020-06-23 PROCEDURE — 80053 COMPREHEN METABOLIC PANEL: CPT | Performed by: OBSTETRICS & GYNECOLOGY

## 2020-06-23 PROCEDURE — 82962 GLUCOSE BLOOD TEST: CPT

## 2020-06-23 PROCEDURE — 85610 PROTHROMBIN TIME: CPT | Performed by: OBSTETRICS & GYNECOLOGY

## 2020-06-23 PROCEDURE — 87635 SARS-COV-2 COVID-19 AMP PRB: CPT | Performed by: OBSTETRICS & GYNECOLOGY

## 2020-06-23 PROCEDURE — 86900 BLOOD TYPING SEROLOGIC ABO: CPT | Performed by: OBSTETRICS & GYNECOLOGY

## 2020-06-23 RX ORDER — CARBOPROST TROMETHAMINE 250 UG/ML
250 INJECTION, SOLUTION INTRAMUSCULAR AS NEEDED
Status: CANCELLED | OUTPATIENT
Start: 2020-06-23

## 2020-06-23 RX ORDER — SODIUM CHLORIDE 0.9 % (FLUSH) 0.9 %
10 SYRINGE (ML) INJECTION AS NEEDED
Status: CANCELLED | OUTPATIENT
Start: 2020-06-23

## 2020-06-23 RX ORDER — PENICILLIN G 3000000 [IU]/50ML
3 INJECTION, SOLUTION INTRAVENOUS EVERY 4 HOURS
Status: CANCELLED | OUTPATIENT
Start: 2020-06-23 | End: 2020-06-26

## 2020-06-23 RX ORDER — METHYLERGONOVINE MALEATE 0.2 MG/ML
200 INJECTION INTRAVENOUS ONCE AS NEEDED
Status: CANCELLED | OUTPATIENT
Start: 2020-06-23

## 2020-06-23 RX ORDER — OXYTOCIN-SODIUM CHLORIDE 0.9% IV SOLN 30 UNIT/500ML 30-0.9/5 UT/ML-%
125 SOLUTION INTRAVENOUS CONTINUOUS PRN
Status: CANCELLED | OUTPATIENT
Start: 2020-06-23

## 2020-06-23 RX ORDER — EPHEDRINE SULFATE 50 MG/ML
5 INJECTION, SOLUTION INTRAVENOUS AS NEEDED
Status: DISCONTINUED | OUTPATIENT
Start: 2020-06-23 | End: 2020-06-25

## 2020-06-23 RX ORDER — OXYTOCIN-SODIUM CHLORIDE 0.9% IV SOLN 30 UNIT/500ML 30-0.9/5 UT/ML-%
999 SOLUTION INTRAVENOUS ONCE
Status: CANCELLED | OUTPATIENT
Start: 2020-06-23

## 2020-06-23 RX ORDER — DIPHENHYDRAMINE HYDROCHLORIDE 50 MG/ML
12.5 INJECTION INTRAMUSCULAR; INTRAVENOUS EVERY 8 HOURS PRN
Status: DISCONTINUED | OUTPATIENT
Start: 2020-06-23 | End: 2020-06-25

## 2020-06-23 RX ORDER — PENICILLIN G 3000000 [IU]/50ML
3 INJECTION, SOLUTION INTRAVENOUS EVERY 4 HOURS
Status: DISCONTINUED | OUTPATIENT
Start: 2020-06-23 | End: 2020-06-24

## 2020-06-23 RX ORDER — OXYTOCIN-SODIUM CHLORIDE 0.9% IV SOLN 30 UNIT/500ML 30-0.9/5 UT/ML-%
250 SOLUTION INTRAVENOUS CONTINUOUS
Status: CANCELLED | OUTPATIENT
Start: 2020-06-23 | End: 2020-06-23

## 2020-06-23 RX ORDER — MAGNESIUM CARB/ALUMINUM HYDROX 105-160MG
30 TABLET,CHEWABLE ORAL ONCE
Status: CANCELLED | OUTPATIENT
Start: 2020-06-23 | End: 2020-06-23

## 2020-06-23 RX ORDER — MAGNESIUM CARB/ALUMINUM HYDROX 105-160MG
30 TABLET,CHEWABLE ORAL ONCE
Status: DISCONTINUED | OUTPATIENT
Start: 2020-06-23 | End: 2020-06-25

## 2020-06-23 RX ORDER — SODIUM CHLORIDE 0.9 % (FLUSH) 0.9 %
10 SYRINGE (ML) INJECTION AS NEEDED
Status: DISCONTINUED | OUTPATIENT
Start: 2020-06-23 | End: 2020-06-25

## 2020-06-23 RX ORDER — MISOPROSTOL 200 UG/1
800 TABLET ORAL AS NEEDED
Status: CANCELLED | OUTPATIENT
Start: 2020-06-23

## 2020-06-23 RX ORDER — SODIUM CHLORIDE 0.9 % (FLUSH) 0.9 %
3 SYRINGE (ML) INJECTION EVERY 12 HOURS SCHEDULED
Status: CANCELLED | OUTPATIENT
Start: 2020-06-23

## 2020-06-23 RX ORDER — SODIUM CHLORIDE 0.9 % (FLUSH) 0.9 %
3 SYRINGE (ML) INJECTION EVERY 12 HOURS SCHEDULED
Status: DISCONTINUED | OUTPATIENT
Start: 2020-06-23 | End: 2020-06-25

## 2020-06-23 RX ORDER — ONDANSETRON 2 MG/ML
4 INJECTION INTRAMUSCULAR; INTRAVENOUS ONCE AS NEEDED
Status: DISCONTINUED | OUTPATIENT
Start: 2020-06-23 | End: 2020-06-25

## 2020-06-23 RX ORDER — SODIUM CHLORIDE, SODIUM LACTATE, POTASSIUM CHLORIDE, CALCIUM CHLORIDE 600; 310; 30; 20 MG/100ML; MG/100ML; MG/100ML; MG/100ML
125 INJECTION, SOLUTION INTRAVENOUS CONTINUOUS
Status: DISCONTINUED | OUTPATIENT
Start: 2020-06-23 | End: 2020-06-25

## 2020-06-23 RX ORDER — FAMOTIDINE 10 MG/ML
20 INJECTION, SOLUTION INTRAVENOUS ONCE AS NEEDED
Status: DISCONTINUED | OUTPATIENT
Start: 2020-06-23 | End: 2020-06-24

## 2020-06-23 RX ADMIN — SODIUM CHLORIDE, POTASSIUM CHLORIDE, SODIUM LACTATE AND CALCIUM CHLORIDE 125 ML/HR: 600; 310; 30; 20 INJECTION, SOLUTION INTRAVENOUS at 19:08

## 2020-06-23 RX ADMIN — DINOPROSTONE 10 MG: 10 INSERT VAGINAL at 19:32

## 2020-06-24 LAB
GLUCOSE BLDC GLUCOMTR-MCNC: 79 MG/DL (ref 70–130)
GLUCOSE BLDC GLUCOMTR-MCNC: 81 MG/DL (ref 70–130)
GLUCOSE BLDC GLUCOMTR-MCNC: 81 MG/DL (ref 70–130)
GLUCOSE BLDC GLUCOMTR-MCNC: 87 MG/DL (ref 70–130)
GLUCOSE BLDC GLUCOMTR-MCNC: 95 MG/DL (ref 70–130)
GLUCOSE BLDC GLUCOMTR-MCNC: 96 MG/DL (ref 70–130)
NITRAZINE EXPIRATION DATE: ABNORMAL
NITRAZINE LOT NUMBER: ABNORMAL
PH FLD: 7.5 [PH]

## 2020-06-24 PROCEDURE — 25010000002 ROPIVACAINE PER 1 MG: Performed by: ANESTHESIOLOGY

## 2020-06-24 PROCEDURE — 25010000002 PENICILLIN G POTASSIUM PER 600000 UNITS: Performed by: OBSTETRICS & GYNECOLOGY

## 2020-06-24 PROCEDURE — C1755 CATHETER, INTRASPINAL: HCPCS | Performed by: ANESTHESIOLOGY

## 2020-06-24 PROCEDURE — S0260 H&P FOR SURGERY: HCPCS | Performed by: OBSTETRICS & GYNECOLOGY

## 2020-06-24 PROCEDURE — 82962 GLUCOSE BLOOD TEST: CPT

## 2020-06-24 RX ORDER — ACETAMINOPHEN 500 MG
1000 TABLET ORAL ONCE
Status: COMPLETED | OUTPATIENT
Start: 2020-06-25 | End: 2020-06-24

## 2020-06-24 RX ORDER — METHYLERGONOVINE MALEATE 0.2 MG/ML
200 INJECTION INTRAVENOUS ONCE AS NEEDED
Status: DISCONTINUED | OUTPATIENT
Start: 2020-06-24 | End: 2020-06-25

## 2020-06-24 RX ORDER — OXYTOCIN-SODIUM CHLORIDE 0.9% IV SOLN 30 UNIT/500ML 30-0.9/5 UT/ML-%
2-20 SOLUTION INTRAVENOUS
Status: DISCONTINUED | OUTPATIENT
Start: 2020-06-24 | End: 2020-06-24

## 2020-06-24 RX ORDER — FAMOTIDINE 10 MG/ML
20 INJECTION, SOLUTION INTRAVENOUS ONCE AS NEEDED
Status: COMPLETED | OUTPATIENT
Start: 2020-06-24 | End: 2020-06-24

## 2020-06-24 RX ORDER — MISOPROSTOL 200 UG/1
800 TABLET ORAL AS NEEDED
Status: DISCONTINUED | OUTPATIENT
Start: 2020-06-24 | End: 2020-06-25

## 2020-06-24 RX ORDER — ACETAMINOPHEN 500 MG
TABLET ORAL
Status: COMPLETED
Start: 2020-06-24 | End: 2020-06-24

## 2020-06-24 RX ORDER — PENICILLIN G 3000000 [IU]/50ML
3 INJECTION, SOLUTION INTRAVENOUS EVERY 4 HOURS
Status: DISCONTINUED | OUTPATIENT
Start: 2020-06-24 | End: 2020-06-24

## 2020-06-24 RX ORDER — OXYTOCIN-SODIUM CHLORIDE 0.9% IV SOLN 30 UNIT/500ML 30-0.9/5 UT/ML-%
250 SOLUTION INTRAVENOUS CONTINUOUS
Status: ACTIVE | OUTPATIENT
Start: 2020-06-25 | End: 2020-06-25

## 2020-06-24 RX ORDER — LIDOCAINE HYDROCHLORIDE AND EPINEPHRINE 20; 5 MG/ML; UG/ML
INJECTION, SOLUTION EPIDURAL; INFILTRATION; INTRACAUDAL; PERINEURAL AS NEEDED
Status: DISCONTINUED | OUTPATIENT
Start: 2020-06-24 | End: 2020-06-25 | Stop reason: SURG

## 2020-06-24 RX ORDER — ERYTHROMYCIN 5 MG/G
OINTMENT OPHTHALMIC
Status: DISPENSED
Start: 2020-06-24 | End: 2020-06-25

## 2020-06-24 RX ORDER — OXYTOCIN-SODIUM CHLORIDE 0.9% IV SOLN 30 UNIT/500ML 30-0.9/5 UT/ML-%
999 SOLUTION INTRAVENOUS ONCE
Status: DISCONTINUED | OUTPATIENT
Start: 2020-06-25 | End: 2020-06-28 | Stop reason: HOSPADM

## 2020-06-24 RX ORDER — OXYTOCIN-SODIUM CHLORIDE 0.9% IV SOLN 30 UNIT/500ML 30-0.9/5 UT/ML-%
125 SOLUTION INTRAVENOUS CONTINUOUS PRN
Status: COMPLETED | OUTPATIENT
Start: 2020-06-25 | End: 2020-06-25

## 2020-06-24 RX ORDER — OXYTOCIN-SODIUM CHLORIDE 0.9% IV SOLN 30 UNIT/500ML 30-0.9/5 UT/ML-%
2-30 SOLUTION INTRAVENOUS
Status: DISCONTINUED | OUTPATIENT
Start: 2020-06-24 | End: 2020-06-25

## 2020-06-24 RX ORDER — PHYTONADIONE 1 MG/.5ML
INJECTION, EMULSION INTRAMUSCULAR; INTRAVENOUS; SUBCUTANEOUS
Status: DISPENSED
Start: 2020-06-24 | End: 2020-06-25

## 2020-06-24 RX ORDER — ONDANSETRON 2 MG/ML
4 INJECTION INTRAMUSCULAR; INTRAVENOUS ONCE
Status: COMPLETED | OUTPATIENT
Start: 2020-06-25 | End: 2020-06-24

## 2020-06-24 RX ORDER — LIDOCAINE HYDROCHLORIDE AND EPINEPHRINE 15; 5 MG/ML; UG/ML
INJECTION, SOLUTION EPIDURAL AS NEEDED
Status: DISCONTINUED | OUTPATIENT
Start: 2020-06-24 | End: 2020-06-25 | Stop reason: SURG

## 2020-06-24 RX ORDER — CEFAZOLIN SODIUM 2 G/100ML
2 INJECTION, SOLUTION INTRAVENOUS ONCE
Status: COMPLETED | OUTPATIENT
Start: 2020-06-25 | End: 2020-06-25

## 2020-06-24 RX ORDER — CARBOPROST TROMETHAMINE 250 UG/ML
250 INJECTION, SOLUTION INTRAMUSCULAR AS NEEDED
Status: DISCONTINUED | OUTPATIENT
Start: 2020-06-24 | End: 2020-06-25

## 2020-06-24 RX ORDER — EPHEDRINE SULFATE 50 MG/ML
25 INJECTION, SOLUTION INTRAVENOUS ONCE
Status: COMPLETED | OUTPATIENT
Start: 2020-06-24 | End: 2020-06-24

## 2020-06-24 RX ORDER — ROPIVACAINE HYDROCHLORIDE 2 MG/ML
INJECTION, SOLUTION EPIDURAL; INFILTRATION; PERINEURAL AS NEEDED
Status: DISCONTINUED | OUTPATIENT
Start: 2020-06-24 | End: 2020-06-25 | Stop reason: SURG

## 2020-06-24 RX ADMIN — ACETAMINOPHEN 1000 MG: 500 TABLET, FILM COATED ORAL at 23:43

## 2020-06-24 RX ADMIN — FAMOTIDINE 20 MG: 10 INJECTION INTRAVENOUS at 23:43

## 2020-06-24 RX ADMIN — CEFAZOLIN SODIUM 2 G: 2 INJECTION, SOLUTION INTRAVENOUS at 23:39

## 2020-06-24 RX ADMIN — ROPIVACAINE HYDROCHLORIDE 10 ML: 2 INJECTION, SOLUTION EPIDURAL; INFILTRATION at 15:57

## 2020-06-24 RX ADMIN — Medication 1000 MG: at 23:43

## 2020-06-24 RX ADMIN — EPHEDRINE SULFATE 5 MG: 50 INJECTION INTRAVENOUS at 17:40

## 2020-06-24 RX ADMIN — SODIUM CHLORIDE, POTASSIUM CHLORIDE, SODIUM LACTATE AND CALCIUM CHLORIDE 125 ML/HR: 600; 310; 30; 20 INJECTION, SOLUTION INTRAVENOUS at 16:13

## 2020-06-24 RX ADMIN — PENICILLIN G 3 MILLION UNITS: 3000000 INJECTION, SOLUTION INTRAVENOUS at 14:30

## 2020-06-24 RX ADMIN — SODIUM CHLORIDE, POTASSIUM CHLORIDE, SODIUM LACTATE AND CALCIUM CHLORIDE 125 ML/HR: 600; 310; 30; 20 INJECTION, SOLUTION INTRAVENOUS at 19:11

## 2020-06-24 RX ADMIN — SODIUM CHLORIDE, POTASSIUM CHLORIDE, SODIUM LACTATE AND CALCIUM CHLORIDE 125 ML/HR: 600; 310; 30; 20 INJECTION, SOLUTION INTRAVENOUS at 02:42

## 2020-06-24 RX ADMIN — EPHEDRINE SULFATE 5 MG: 50 INJECTION INTRAVENOUS at 17:51

## 2020-06-24 RX ADMIN — PENICILLIN G 3 MILLION UNITS: 3000000 INJECTION, SOLUTION INTRAVENOUS at 22:27

## 2020-06-24 RX ADMIN — ONDANSETRON 4 MG: 2 INJECTION INTRAMUSCULAR; INTRAVENOUS at 23:42

## 2020-06-24 RX ADMIN — OXYTOCIN 2 MILLI-UNITS/MIN: 10 INJECTION, SOLUTION INTRAMUSCULAR; INTRAVENOUS at 10:28

## 2020-06-24 RX ADMIN — Medication 10 ML/HR: at 16:02

## 2020-06-24 RX ADMIN — AZITHROMYCIN MONOHYDRATE 500 MG: 500 INJECTION, POWDER, LYOPHILIZED, FOR SOLUTION INTRAVENOUS at 23:39

## 2020-06-24 RX ADMIN — EPHEDRINE SULFATE 25 MG: 50 INJECTION INTRAVENOUS at 19:49

## 2020-06-24 RX ADMIN — LIDOCAINE HYDROCHLORIDE,EPINEPHRINE BITARTRATE 10 ML: 20; .005 INJECTION, SOLUTION EPIDURAL; INFILTRATION; INTRACAUDAL; PERINEURAL at 23:55

## 2020-06-24 RX ADMIN — LIDOCAINE HYDROCHLORIDE AND EPINEPHRINE 4 ML: 15; 5 INJECTION, SOLUTION EPIDURAL at 15:55

## 2020-06-24 RX ADMIN — SODIUM CHLORIDE 5 MILLION UNITS: 900 INJECTION INTRAVENOUS at 10:29

## 2020-06-24 RX ADMIN — PENICILLIN G 3 MILLION UNITS: 3000000 INJECTION, SOLUTION INTRAVENOUS at 18:31

## 2020-06-24 RX ADMIN — SODIUM CHLORIDE, POTASSIUM CHLORIDE, SODIUM LACTATE AND CALCIUM CHLORIDE 125 ML/HR: 600; 310; 30; 20 INJECTION, SOLUTION INTRAVENOUS at 10:28

## 2020-06-24 NOTE — ANESTHESIA PREPROCEDURE EVALUATION
Anesthesia Evaluation     Patient summary reviewed and Nursing notes reviewed   no history of anesthetic complications:               Airway   Mallampati: II  TM distance: >3 FB  Neck ROM: full  no difficulty expected  Dental - normal exam     Pulmonary     breath sounds clear to auscultation  (+) asthma,  (-) shortness of breath, sleep apnea, decreased breath sounds, wheezes  Cardiovascular - normal exam  Exercise tolerance: good (4-7 METS)    Rhythm: regular  Rate: normal    (+) DVT,   (-) past MI, angina, CHF, orthopnea, PND, [place], PVD      Neuro/Psych- negative ROS  (-) seizures, neuromuscular disease, TIA, CVA, dizziness/light headedness, weakness, numbness  GI/Hepatic/Renal/Endo    (+) morbid obesity,  diabetes mellitus gestational,   (-) liver disease    Musculoskeletal (-) negative ROS    Abdominal  - normal exam   Substance History - negative use  (-) alcohol use, drug use     OB/GYN    (+) Pregnant,         Other   blood dyscrasia (factor V),                     Anesthesia Plan    ASA 3     epidural   (  38w0d  )    Anesthetic plan, all risks, benefits, and alternatives have been provided, discussed and informed consent has been obtained with: patient and spouse.

## 2020-06-24 NOTE — ANESTHESIA PROCEDURE NOTES
Labor Epidural      Patient location during procedure: OB  Performed By  Anesthesiologist: Barrett Dominguez MD  Preanesthetic Checklist  Completed: patient identified, pre-op evaluation, timeout performed and risks and benefits discussed  Additional Notes  38 week gestation  Prep:  Pt Position:sitting  Sterile Tech:cap, gloves, mask and sterile barrier  Prep:chlorhexidine gluconate and isopropyl alcohol  Monitoring:blood pressure monitoring, continuous pulse oximetry and EKG  Epidural Block Procedure:  Approach:midline  Guidance:landmark technique  Location:L3-L4  Needle Type:Tuohy  Needle Gauge:17  Loss of Resistance Medium: air  Loss of Resistance: 8cm  Cath Depth at skin:13 cm  Paresthesia: none  Aspiration:negative  Test Dose:negative  Number of Attempts: 1  Post Assessment:  Dressing:occlusive dressing applied and secured with tape  Pt Tolerance:patient tolerated the procedure well with no apparent complications  Complications:no

## 2020-06-25 LAB
ATMOSPHERIC PRESS: 748.7 MMHG
ATMOSPHERIC PRESS: 750.4 MMHG
BASE EXCESS BLDCOA CALC-SCNC: -2.2 MMOL/L
BASE EXCESS BLDCOV CALC-SCNC: -0.3 MMOL/L (ref -30–30)
BASOPHILS # BLD AUTO: 0.02 10*3/MM3 (ref 0–0.2)
BASOPHILS NFR BLD AUTO: 0.2 % (ref 0–1.5)
BDY SITE: ABNORMAL
BDY SITE: ABNORMAL
COLLECT TME SMN: ABNORMAL
DEPRECATED RDW RBC AUTO: 44.2 FL (ref 37–54)
EOSINOPHIL # BLD AUTO: 0 10*3/MM3 (ref 0–0.4)
EOSINOPHIL NFR BLD AUTO: 0 % (ref 0.3–6.2)
ERYTHROCYTE [DISTWIDTH] IN BLOOD BY AUTOMATED COUNT: 13.2 % (ref 12.3–15.4)
GAS FLOW AIRWAY: 2 LPM
GAS FLOW AIRWAY: 2 LPM
HCO3 BLDCOA-SCNC: 23.5 MMOL/L (ref 22–28)
HCO3 BLDCOV-SCNC: 25.1 MMOL/L
HCT VFR BLD AUTO: 31.6 % (ref 34–46.6)
HGB BLD-MCNC: 11 G/DL (ref 12–15.9)
IMM GRANULOCYTES # BLD AUTO: 0.07 10*3/MM3 (ref 0–0.05)
IMM GRANULOCYTES NFR BLD AUTO: 0.6 % (ref 0–0.5)
LYMPHOCYTES # BLD AUTO: 0.63 10*3/MM3 (ref 0.7–3.1)
LYMPHOCYTES NFR BLD AUTO: 5.4 % (ref 19.6–45.3)
MCH RBC QN AUTO: 32.1 PG (ref 26.6–33)
MCHC RBC AUTO-ENTMCNC: 34.8 G/DL (ref 31.5–35.7)
MCV RBC AUTO: 92.1 FL (ref 79–97)
MODALITY: ABNORMAL
MODALITY: ABNORMAL
MONOCYTES # BLD AUTO: 1.11 10*3/MM3 (ref 0.1–0.9)
MONOCYTES NFR BLD AUTO: 9.5 % (ref 5–12)
NEUTROPHILS # BLD AUTO: 9.81 10*3/MM3 (ref 1.7–7)
NEUTROPHILS NFR BLD AUTO: 84.3 % (ref 42.7–76)
NOTE: ABNORMAL
NOTE: ABNORMAL
NRBC BLD AUTO-RTO: 0 /100 WBC (ref 0–0.2)
PCO2 BLDCOA: 42.7 MMHG (ref 43–63)
PCO2 BLDCOV: 42.6 MM HG (ref 35–51.3)
PH BLDCOA: 7.35 PH UNITS (ref 7.18–7.34)
PH BLDCOV: 7.38 PH UNITS (ref 7.26–7.4)
PLATELET # BLD AUTO: 161 10*3/MM3 (ref 140–450)
PMV BLD AUTO: 10.7 FL (ref 6–12)
PO2 BLDCOA: 19.7 MMHG (ref 12–26)
PO2 BLDCOV: 25.6 MM HG (ref 19–39)
RBC # BLD AUTO: 3.43 10*6/MM3 (ref 3.77–5.28)
SAO2 % BLDCOA: 28.5 % (ref 92–99)
SAO2 % BLDCOA: 45.3 % (ref 92–99)
SAO2 % BLDCOV: ABNORMAL %
WBC NRBC COR # BLD: 11.64 10*3/MM3 (ref 3.4–10.8)

## 2020-06-25 PROCEDURE — 25010000002 ENOXAPARIN PER 10 MG: Performed by: OBSTETRICS & GYNECOLOGY

## 2020-06-25 PROCEDURE — 59510 CESAREAN DELIVERY: CPT | Performed by: OBSTETRICS & GYNECOLOGY

## 2020-06-25 PROCEDURE — 85025 COMPLETE CBC W/AUTO DIFF WBC: CPT | Performed by: OBSTETRICS & GYNECOLOGY

## 2020-06-25 PROCEDURE — 25010000002 PROMETHAZINE PER 50 MG: Performed by: OBSTETRICS & GYNECOLOGY

## 2020-06-25 PROCEDURE — 88307 TISSUE EXAM BY PATHOLOGIST: CPT

## 2020-06-25 PROCEDURE — 25010000002 MORPHINE PER 10 MG: Performed by: ANESTHESIOLOGY

## 2020-06-25 PROCEDURE — 25010000002 AZITHROMYCIN PER 500 MG: Performed by: OBSTETRICS & GYNECOLOGY

## 2020-06-25 PROCEDURE — 25010000002 ONDANSETRON PER 1 MG: Performed by: ANESTHESIOLOGY

## 2020-06-25 PROCEDURE — 82803 BLOOD GASES ANY COMBINATION: CPT

## 2020-06-25 PROCEDURE — 25010000003 CEFAZOLIN IN DEXTROSE 2-4 GM/100ML-% SOLUTION: Performed by: OBSTETRICS & GYNECOLOGY

## 2020-06-25 PROCEDURE — 25010000002 PHENYLEPHRINE PER 1 ML: Performed by: ANESTHESIOLOGY

## 2020-06-25 RX ORDER — DIPHENHYDRAMINE HYDROCHLORIDE 50 MG/ML
25 INJECTION INTRAMUSCULAR; INTRAVENOUS EVERY 4 HOURS PRN
Status: DISCONTINUED | OUTPATIENT
Start: 2020-06-25 | End: 2020-06-28 | Stop reason: HOSPADM

## 2020-06-25 RX ORDER — LANOLIN
CREAM (ML) TOPICAL
Status: DISCONTINUED | OUTPATIENT
Start: 2020-06-25 | End: 2020-06-28 | Stop reason: HOSPADM

## 2020-06-25 RX ORDER — SIMETHICONE 80 MG
80 TABLET,CHEWABLE ORAL 4 TIMES DAILY PRN
Status: DISCONTINUED | OUTPATIENT
Start: 2020-06-25 | End: 2020-06-28 | Stop reason: HOSPADM

## 2020-06-25 RX ORDER — SCOLOPAMINE TRANSDERMAL SYSTEM 1 MG/1
1 PATCH, EXTENDED RELEASE TRANSDERMAL
Status: DISCONTINUED | OUTPATIENT
Start: 2020-06-25 | End: 2020-06-28 | Stop reason: HOSPADM

## 2020-06-25 RX ORDER — PROMETHAZINE HYDROCHLORIDE 25 MG/ML
12.5 INJECTION, SOLUTION INTRAMUSCULAR; INTRAVENOUS EVERY 6 HOURS PRN
Status: DISCONTINUED | OUTPATIENT
Start: 2020-06-25 | End: 2020-06-28 | Stop reason: HOSPADM

## 2020-06-25 RX ORDER — OXYCODONE HYDROCHLORIDE AND ACETAMINOPHEN 5; 325 MG/1; MG/1
1 TABLET ORAL EVERY 4 HOURS PRN
Status: DISCONTINUED | OUTPATIENT
Start: 2020-06-25 | End: 2020-06-28 | Stop reason: HOSPADM

## 2020-06-25 RX ORDER — ONDANSETRON 2 MG/ML
4 INJECTION INTRAMUSCULAR; INTRAVENOUS EVERY 6 HOURS PRN
Status: DISCONTINUED | OUTPATIENT
Start: 2020-06-25 | End: 2020-06-28 | Stop reason: HOSPADM

## 2020-06-25 RX ORDER — MORPHINE SULFATE 2 MG/ML
2 INJECTION, SOLUTION INTRAMUSCULAR; INTRAVENOUS
Status: ACTIVE | OUTPATIENT
Start: 2020-06-25 | End: 2020-06-26

## 2020-06-25 RX ORDER — OXYCODONE HYDROCHLORIDE AND ACETAMINOPHEN 5; 325 MG/1; MG/1
2 TABLET ORAL EVERY 4 HOURS PRN
Status: DISCONTINUED | OUTPATIENT
Start: 2020-06-25 | End: 2020-06-28 | Stop reason: HOSPADM

## 2020-06-25 RX ORDER — MORPHINE SULFATE 1 MG/ML
INJECTION, SOLUTION EPIDURAL; INTRATHECAL; INTRAVENOUS AS NEEDED
Status: DISCONTINUED | OUTPATIENT
Start: 2020-06-25 | End: 2020-06-25 | Stop reason: SURG

## 2020-06-25 RX ORDER — IBUPROFEN 600 MG/1
600 TABLET ORAL EVERY 8 HOURS PRN
Status: CANCELLED | OUTPATIENT
Start: 2020-06-25

## 2020-06-25 RX ORDER — ONDANSETRON 2 MG/ML
4 INJECTION INTRAMUSCULAR; INTRAVENOUS ONCE AS NEEDED
Status: COMPLETED | OUTPATIENT
Start: 2020-06-25 | End: 2020-06-25

## 2020-06-25 RX ORDER — MISOPROSTOL 200 UG/1
600 TABLET ORAL ONCE AS NEEDED
Status: DISCONTINUED | OUTPATIENT
Start: 2020-06-25 | End: 2020-06-28 | Stop reason: HOSPADM

## 2020-06-25 RX ORDER — DOCUSATE SODIUM 100 MG/1
100 CAPSULE, LIQUID FILLED ORAL 2 TIMES DAILY
Status: DISCONTINUED | OUTPATIENT
Start: 2020-06-25 | End: 2020-06-28 | Stop reason: HOSPADM

## 2020-06-25 RX ORDER — SODIUM CHLORIDE, SODIUM LACTATE, POTASSIUM CHLORIDE, CALCIUM CHLORIDE 600; 310; 30; 20 MG/100ML; MG/100ML; MG/100ML; MG/100ML
125 INJECTION, SOLUTION INTRAVENOUS CONTINUOUS
Status: DISCONTINUED | OUTPATIENT
Start: 2020-06-25 | End: 2020-06-28 | Stop reason: HOSPADM

## 2020-06-25 RX ORDER — METHYLERGONOVINE MALEATE 0.2 MG/ML
200 INJECTION INTRAVENOUS ONCE AS NEEDED
Status: DISCONTINUED | OUTPATIENT
Start: 2020-06-25 | End: 2020-06-28 | Stop reason: HOSPADM

## 2020-06-25 RX ORDER — HYDROMORPHONE HYDROCHLORIDE 1 MG/ML
0.25 INJECTION, SOLUTION INTRAMUSCULAR; INTRAVENOUS; SUBCUTANEOUS
Status: ACTIVE | OUTPATIENT
Start: 2020-06-25 | End: 2020-06-26

## 2020-06-25 RX ORDER — DIPHENHYDRAMINE HCL 25 MG
25 CAPSULE ORAL EVERY 4 HOURS PRN
Status: DISCONTINUED | OUTPATIENT
Start: 2020-06-25 | End: 2020-06-28 | Stop reason: HOSPADM

## 2020-06-25 RX ADMIN — SCOPALAMINE 1 PATCH: 1 PATCH, EXTENDED RELEASE TRANSDERMAL at 09:33

## 2020-06-25 RX ADMIN — PHENYLEPHRINE HYDROCHLORIDE 100 MCG: 10 INJECTION INTRAVENOUS at 00:16

## 2020-06-25 RX ADMIN — EPHEDRINE SULFATE 5 MG: 50 INJECTION INTRAVENOUS at 00:55

## 2020-06-25 RX ADMIN — PHENYLEPHRINE HYDROCHLORIDE 200 MCG: 10 INJECTION INTRAVENOUS at 00:29

## 2020-06-25 RX ADMIN — PHENYLEPHRINE HYDROCHLORIDE 100 MCG: 10 INJECTION INTRAVENOUS at 00:50

## 2020-06-25 RX ADMIN — LIDOCAINE HYDROCHLORIDE,EPINEPHRINE BITARTRATE 1 ML: 20; .005 INJECTION, SOLUTION EPIDURAL; INFILTRATION; INTRACAUDAL; PERINEURAL at 01:00

## 2020-06-25 RX ADMIN — ONDANSETRON 4 MG: 2 INJECTION INTRAMUSCULAR; INTRAVENOUS at 03:14

## 2020-06-25 RX ADMIN — MORPHINE SULFATE 2.5 MG: 1 INJECTION, SOLUTION EPIDURAL; INTRATHECAL; INTRAVENOUS at 00:59

## 2020-06-25 RX ADMIN — PHENYLEPHRINE HYDROCHLORIDE 200 MCG: 10 INJECTION INTRAVENOUS at 00:45

## 2020-06-25 RX ADMIN — SODIUM CHLORIDE, POTASSIUM CHLORIDE, SODIUM LACTATE AND CALCIUM CHLORIDE 125 ML/HR: 600; 310; 30; 20 INJECTION, SOLUTION INTRAVENOUS at 06:09

## 2020-06-25 RX ADMIN — ENOXAPARIN SODIUM 30 MG: 30 INJECTION SUBCUTANEOUS at 16:55

## 2020-06-25 RX ADMIN — PROMETHAZINE HYDROCHLORIDE 12.5 MG: 25 INJECTION INTRAMUSCULAR; INTRAVENOUS at 04:34

## 2020-06-25 RX ADMIN — DOCUSATE SODIUM 100 MG: 100 CAPSULE, LIQUID FILLED ORAL at 21:27

## 2020-06-25 RX ADMIN — LIDOCAINE HYDROCHLORIDE,EPINEPHRINE BITARTRATE 5 ML: 20; .005 INJECTION, SOLUTION EPIDURAL; INFILTRATION; INTRACAUDAL; PERINEURAL at 00:05

## 2020-06-25 RX ADMIN — OXYTOCIN 125 ML/HR: 10 INJECTION INTRAVENOUS at 01:59

## 2020-06-25 RX ADMIN — PHENYLEPHRINE HYDROCHLORIDE 100 MCG: 10 INJECTION INTRAVENOUS at 00:55

## 2020-06-25 NOTE — ANESTHESIA POSTPROCEDURE EVALUATION
"Patient: Ines Padron    Procedure Summary     Date:  20 Room / Location:   [facility] /  [facility]    Anesthesia Start:  1542 Anesthesia Stop:  20    Procedure:   SECTION PRIMARY (N/A Abdomen) Diagnosis:  (FETAL INTOLERANCE TO LABOR/ FAILLURE TO PROGRESS)    Surgeon:  Becky Mendez MD Provider:  Tian Younger MD    Anesthesia Type:  epidural ASA Status:  3          Anesthesia Type: epidural    Vitals  Vitals Value Taken Time   /59 2020  2:19 AM   Temp 36.6 °C (97.9 °F) 2020  1:15 AM   Pulse 100 2020  2:20 AM   Resp 16 2020  2:15 AM   SpO2 99 % 2020  2:20 AM   Vitals shown include unvalidated device data.        Post Anesthesia Care and Evaluation    Patient location during evaluation: bedside  Patient participation: complete - patient participated  Level of consciousness: awake and alert  Pain management: adequate  Airway patency: patent  Anesthetic complications: No anesthetic complications    Cardiovascular status: acceptable  Respiratory status: acceptable  Hydration status: acceptable    Comments: /59   Pulse 98   Temp 36.6 °C (97.9 °F) (Oral)   Resp 16   Ht 160 cm (63\")   Wt 108 kg (238 lb 9.6 oz)   LMP 10/02/2019 (Exact Date)   SpO2 99%   Breastfeeding Yes   BMI 42.27 kg/m²           "

## 2020-06-26 LAB
BASOPHILS # BLD AUTO: 0.02 10*3/MM3 (ref 0–0.2)
BASOPHILS NFR BLD AUTO: 0.2 % (ref 0–1.5)
DEPRECATED RDW RBC AUTO: 44.7 FL (ref 37–54)
EOSINOPHIL # BLD AUTO: 0.05 10*3/MM3 (ref 0–0.4)
EOSINOPHIL NFR BLD AUTO: 0.5 % (ref 0.3–6.2)
ERYTHROCYTE [DISTWIDTH] IN BLOOD BY AUTOMATED COUNT: 13.2 % (ref 12.3–15.4)
HCT VFR BLD AUTO: 30.6 % (ref 34–46.6)
HGB BLD-MCNC: 10.8 G/DL (ref 12–15.9)
IMM GRANULOCYTES # BLD AUTO: 0.09 10*3/MM3 (ref 0–0.05)
IMM GRANULOCYTES NFR BLD AUTO: 0.9 % (ref 0–0.5)
LYMPHOCYTES # BLD AUTO: 1.17 10*3/MM3 (ref 0.7–3.1)
LYMPHOCYTES NFR BLD AUTO: 11.6 % (ref 19.6–45.3)
MCH RBC QN AUTO: 32.7 PG (ref 26.6–33)
MCHC RBC AUTO-ENTMCNC: 35.3 G/DL (ref 31.5–35.7)
MCV RBC AUTO: 92.7 FL (ref 79–97)
MONOCYTES # BLD AUTO: 1.08 10*3/MM3 (ref 0.1–0.9)
MONOCYTES NFR BLD AUTO: 10.7 % (ref 5–12)
NEUTROPHILS # BLD AUTO: 7.64 10*3/MM3 (ref 1.7–7)
NEUTROPHILS NFR BLD AUTO: 76.1 % (ref 42.7–76)
NRBC BLD AUTO-RTO: 0 /100 WBC (ref 0–0.2)
PLATELET # BLD AUTO: 148 10*3/MM3 (ref 140–450)
PMV BLD AUTO: 10.5 FL (ref 6–12)
RBC # BLD AUTO: 3.3 10*6/MM3 (ref 3.77–5.28)
WBC NRBC COR # BLD: 10.05 10*3/MM3 (ref 3.4–10.8)

## 2020-06-26 PROCEDURE — 85025 COMPLETE CBC W/AUTO DIFF WBC: CPT | Performed by: OBSTETRICS & GYNECOLOGY

## 2020-06-26 PROCEDURE — 25010000002 ENOXAPARIN PER 10 MG: Performed by: OBSTETRICS & GYNECOLOGY

## 2020-06-26 RX ADMIN — OXYCODONE HYDROCHLORIDE AND ACETAMINOPHEN 1 TABLET: 5; 325 TABLET ORAL at 07:55

## 2020-06-26 RX ADMIN — Medication 80 MG: at 21:50

## 2020-06-26 RX ADMIN — OXYCODONE HYDROCHLORIDE AND ACETAMINOPHEN 1 TABLET: 5; 325 TABLET ORAL at 12:51

## 2020-06-26 RX ADMIN — DOCUSATE SODIUM 100 MG: 100 CAPSULE, LIQUID FILLED ORAL at 09:26

## 2020-06-26 RX ADMIN — OXYCODONE HYDROCHLORIDE AND ACETAMINOPHEN 2 TABLET: 5; 325 TABLET ORAL at 21:12

## 2020-06-26 RX ADMIN — ENOXAPARIN SODIUM 30 MG: 30 INJECTION SUBCUTANEOUS at 16:58

## 2020-06-26 RX ADMIN — DOCUSATE SODIUM 100 MG: 100 CAPSULE, LIQUID FILLED ORAL at 20:22

## 2020-06-26 RX ADMIN — OXYCODONE HYDROCHLORIDE AND ACETAMINOPHEN 1 TABLET: 5; 325 TABLET ORAL at 02:04

## 2020-06-26 RX ADMIN — OXYCODONE HYDROCHLORIDE AND ACETAMINOPHEN 1 TABLET: 5; 325 TABLET ORAL at 17:08

## 2020-06-26 RX ADMIN — ENOXAPARIN SODIUM 30 MG: 30 INJECTION SUBCUTANEOUS at 05:08

## 2020-06-27 PROCEDURE — 25010000002 ENOXAPARIN PER 10 MG: Performed by: OBSTETRICS & GYNECOLOGY

## 2020-06-27 RX ADMIN — DOCUSATE SODIUM 100 MG: 100 CAPSULE, LIQUID FILLED ORAL at 09:47

## 2020-06-27 RX ADMIN — Medication 80 MG: at 20:32

## 2020-06-27 RX ADMIN — OXYCODONE HYDROCHLORIDE AND ACETAMINOPHEN 1 TABLET: 5; 325 TABLET ORAL at 16:21

## 2020-06-27 RX ADMIN — OXYCODONE HYDROCHLORIDE AND ACETAMINOPHEN 2 TABLET: 5; 325 TABLET ORAL at 20:27

## 2020-06-27 RX ADMIN — OXYCODONE HYDROCHLORIDE AND ACETAMINOPHEN 2 TABLET: 5; 325 TABLET ORAL at 09:47

## 2020-06-27 RX ADMIN — ENOXAPARIN SODIUM 30 MG: 30 INJECTION SUBCUTANEOUS at 16:20

## 2020-06-27 RX ADMIN — OXYCODONE HYDROCHLORIDE AND ACETAMINOPHEN 2 TABLET: 5; 325 TABLET ORAL at 05:24

## 2020-06-27 RX ADMIN — OXYCODONE HYDROCHLORIDE AND ACETAMINOPHEN 2 TABLET: 5; 325 TABLET ORAL at 01:41

## 2020-06-27 RX ADMIN — ENOXAPARIN SODIUM 30 MG: 30 INJECTION SUBCUTANEOUS at 05:24

## 2020-06-27 RX ADMIN — DOCUSATE SODIUM 100 MG: 100 CAPSULE, LIQUID FILLED ORAL at 20:27

## 2020-06-28 VITALS
BODY MASS INDEX: 42.28 KG/M2 | OXYGEN SATURATION: 98 % | WEIGHT: 238.6 LBS | TEMPERATURE: 98 F | SYSTOLIC BLOOD PRESSURE: 110 MMHG | RESPIRATION RATE: 18 BRPM | HEIGHT: 63 IN | DIASTOLIC BLOOD PRESSURE: 76 MMHG | HEART RATE: 90 BPM

## 2020-06-28 PROCEDURE — 25010000002 ENOXAPARIN PER 10 MG: Performed by: OBSTETRICS & GYNECOLOGY

## 2020-06-28 PROCEDURE — 99024 POSTOP FOLLOW-UP VISIT: CPT | Performed by: OBSTETRICS & GYNECOLOGY

## 2020-06-28 RX ORDER — OXYCODONE HYDROCHLORIDE AND ACETAMINOPHEN 5; 325 MG/1; MG/1
1 TABLET ORAL EVERY 4 HOURS PRN
Qty: 14 TABLET | Refills: 0 | Status: SHIPPED | OUTPATIENT
Start: 2020-06-28 | End: 2020-07-05

## 2020-06-28 RX ADMIN — OXYCODONE HYDROCHLORIDE AND ACETAMINOPHEN 1 TABLET: 5; 325 TABLET ORAL at 05:10

## 2020-06-28 RX ADMIN — OXYCODONE HYDROCHLORIDE AND ACETAMINOPHEN 1 TABLET: 5; 325 TABLET ORAL at 00:41

## 2020-06-28 RX ADMIN — DOCUSATE SODIUM 100 MG: 100 CAPSULE, LIQUID FILLED ORAL at 09:30

## 2020-06-28 RX ADMIN — Medication 80 MG: at 09:30

## 2020-06-28 RX ADMIN — ENOXAPARIN SODIUM 30 MG: 30 INJECTION SUBCUTANEOUS at 05:11

## 2020-06-29 ENCOUNTER — TELEPHONE (OUTPATIENT)
Dept: OBSTETRICS AND GYNECOLOGY | Age: 25
End: 2020-06-29

## 2020-06-29 NOTE — TELEPHONE ENCOUNTER
Patient called, wanted to set up her 2 week postpartum check up. I over looked your schedule to see if you had anything in two weeks and it looks like your pretty booked up. Would you like for this patient to see a np/pa instead? Please advise

## 2020-07-09 ENCOUNTER — TELEPHONE (OUTPATIENT)
Dept: LACTATION | Facility: HOSPITAL | Age: 25
End: 2020-07-09

## 2020-07-09 NOTE — PROGRESS NOTES
Chief complaint:postop check    HPI  Ines Padron is a 25 y.o. female presents for incision check. She denies heavy bleeding or fevers. She feels well. She is still on lovenox 30 mg SC twice daily. She denies any leg pain or swelling.         The following portions of the patient's history were reviewed and updated as appropriate: allergies, current medications, past family history, past medical history, past social history, past surgical history and problem list.    Review of Systems  Pertinent items are noted in HPI.    /62   Wt 96.2 kg (212 lb)   LMP 10/02/2019 (Exact Date)   Breastfeeding Yes   BMI 37.55 kg/m²         Physical Exam   Constitutional: She appears well-developed and well-nourished.   Pulmonary/Chest: Effort normal.   Abdominal: Soft. There is no tenderness. There is no guarding.   Incision clean/dry/intact; no surrounding redness or induration   Psychiatric: She has a normal mood and affect. Her behavior is normal.           Ines was seen today for postpartum care.    Diagnoses and all orders for this visit:    Postpartum care and examination      Pt is doing well. She denies any mood issues. Her son is doing well. She continues to breastfeed without issues. Reviewed ongoing incision and pp care/restrictions. Plan f/u 4 weeks. Pt will continue lovenox 30 mg SC twice daily until she sees her hematologist in August.     Discussed contraception. She notes her fiance plans a vasectomy

## 2020-07-10 ENCOUNTER — POSTPARTUM VISIT (OUTPATIENT)
Dept: OBSTETRICS AND GYNECOLOGY | Age: 25
End: 2020-07-10

## 2020-07-10 VITALS — BODY MASS INDEX: 37.55 KG/M2 | DIASTOLIC BLOOD PRESSURE: 62 MMHG | SYSTOLIC BLOOD PRESSURE: 102 MMHG | WEIGHT: 212 LBS

## 2020-07-10 PROBLEM — O24.410 DIET CONTROLLED GESTATIONAL DIABETES MELLITUS (GDM), ANTEPARTUM: Status: RESOLVED | Noted: 2020-05-01 | Resolved: 2020-07-10

## 2020-07-10 PROBLEM — O23.40 GROUP B STREPTOCOCCUS URINARY TRACT INFECTION AFFECTING PREGNANCY, ANTEPARTUM: Status: RESOLVED | Noted: 2019-11-22 | Resolved: 2020-07-10

## 2020-07-10 PROBLEM — B95.1 GROUP B STREPTOCOCCUS URINARY TRACT INFECTION AFFECTING PREGNANCY, ANTEPARTUM: Status: RESOLVED | Noted: 2019-11-22 | Resolved: 2020-07-10

## 2020-07-10 PROBLEM — D68.51 FACTOR V LEIDEN (HCC): Status: RESOLVED | Noted: 2017-10-30 | Resolved: 2020-07-10

## 2020-07-10 PROCEDURE — 0503F POSTPARTUM CARE VISIT: CPT | Performed by: OBSTETRICS & GYNECOLOGY

## 2020-07-17 ENCOUNTER — TELEPHONE (OUTPATIENT)
Dept: PHARMACY | Facility: HOSPITAL | Age: 25
End: 2020-07-17

## 2020-07-17 NOTE — TELEPHONE ENCOUNTER
"Unable to e-scribe her lovenox refills due to the \"sig\" line containing too many characters for accepting pharmacy. Called in 2 refills for her Lovenox to her [facility] pharmacy.  "

## 2020-07-17 NOTE — TELEPHONE ENCOUNTER
CAROLYN from [facility] called to [facility]olog[facility] script that Serena called in. Was called in as same SIG as before. That pharmacy never filled it. So clarified SIG as 30 MG SQ Q 12 Hrs with 1 refill.

## 2020-08-09 NOTE — PROGRESS NOTES
[facility] OUTPATIENT FOLLOW UP CLINIC VISIT    REASON FOR FOLLOW-UP:    1.  History of postoperative left lower extremity DVT following arthroscopic left knee surgery.  Also provoked by oral estrogen contraceptive use.  2.  She is a heterozygote for the factor V Leiden [id number] mutation  3.  Prophylactic Lovenox and heparin during pregnancy  4.  Low transverse  section 2020 done for fetal intolerance to labor, failure to progress.    HISTORY OF PRESENT ILLNESS:  Ines Padron is a 25 y.o. female who returns today for follow up of the above issue.      Video visit today.     section 2020 due to failure to progress in labor.    Had some vaginal bleeding for about 2 weeks.  Otherwise no bleeding.  She continues prophylactic Lovenox.  She has about 11 injections left.  She is doing very well with these.  She and her infant son are doing well otherwise.    HEMATOLOGIC HISTORY:  She was initially seen back in 2014.  At that time she was a 19-year-old female.  She was referred with a history of left lower extremity DVT following knee surgery.  She had an arthroscopic left knee procedure in May 2012 and had a subsequent surgery on 2012 by Dr. Dom Garcia with a cartilage implant in the left knee.  On 10/16/2012 she was diagnosed with a left popliteal and calf vein DVT.  This was an incompletely occlusive DVT.  She was on an estrogen containing oral contraceptive at the time.  She was started on warfarin which was discontinued as of 2013.  A venous duplex on 2013 showed an old calf vein thrombosis in the left leg.     She subsequently developed right knee pain.  She had a right knee arthroplasty with excision of a ganglion cyst and repair of the lateral meniscus on 2014.     She was initially seen here in the office on 2014 18.  She was prescribed prophylactic Xarelto at 10 mg daily.     She was found to be a heterozygote for the factor V Leiden  mutation.  There was no evidence for a prothrombin gene mutation.  She stopped the Xarelto when her leg came out of the brace.    ALLERGIES:  No Known Allergies    MEDICATIONS:  The medication list has been reviewed with the patient by the medical assistant, and the list has been updated in the electronic medical record, which I reviewed.  Medication dosages and frequencies were confirmed to be accurate.    REVIEW OF SYSTEMS:  PAIN:  See Vital Signs below.  GENERAL:  No fevers, chills, night sweats, or unintended weight loss.  Mild pain with injections.  SKIN:  No rashes or non-healing lesions.  HEME/LYMPH:  No abnormal bleeding.   EYES:  No vision changes or diplopia.  ENT:  No sore throat or difficulty swallowing.  RESPIRATORY:  No cough, shortness of breath, hemoptysis, or wheezing.  CARDIOVASCULAR:  No chest pain, palpitations, orthopnea, or dyspnea on exertion.  GASTROINTESTINAL:    No nausea/vomiting/diarrhea  GENITOURINARY:  No dysuria or hematuria.  MUSCULOSKELETAL:  No joint pain, swelling, or erythema.  NEUROLOGIC:  No dizziness, loss of consciousness, or seizures.  PSYCHIATRIC:  No depression, anxiety, or mood changes.  Reviewed 8/10/2020    There were no vitals filed for this visit.    PHYSICAL EXAMINATION:  GENERAL:  Well-developed well-nourished female; awake, alert and oriented, in no acute distress.  She was seen with a video visit today.  Reviewed 8/10/2020    DIAGNOSTIC DATA:  Results for orders placed or performed during the hospital encounter of 06/23/20   COVID-19, ABBOTT IN-HOUSE,NP Swab (NO TRANSPORT MEDIA) 2 HR TAT - Swab, Nasopharynx   Result Value Ref Range    COVID19 Not Detected Not Detected - Ref. Range   CBC (No Diff)   Result Value Ref Range    WBC 8.10 3.40 - 10.80 10*3/mm3    RBC 4.04 3.77 - 5.28 10*6/mm3    Hemoglobin 12.9 12.0 - 15.9 g/dL    Hematocrit 37.0 34.0 - 46.6 %    MCV 91.6 79.0 - 97.0 fL    MCH 31.9 26.6 - 33.0 pg    MCHC 34.9 31.5 - 35.7 g/dL    RDW 13.3 12.3 - 15.4 %     RDW-SD 44.4 37.0 - 54.0 fl    MPV 10.5 6.0 - 12.0 fL    Platelets 222 140 - 450 10*3/mm3   aPTT   Result Value Ref Range    PTT 27.4 22.7 - 35.4 seconds   Comprehensive Metabolic Panel   Result Value Ref Range    Glucose 99 65 - 99 mg/dL    BUN 13 6 - 20 mg/dL    Creatinine 0.84 0.57 - 1.00 mg/dL    Sodium 131 (L) 136 - 145 mmol/L    Potassium 3.8 3.5 - 5.2 mmol/L    Chloride 98 98 - 107 mmol/L    CO2 17.9 (L) 22.0 - 29.0 mmol/L    Calcium 10.0 8.6 - 10.5 mg/dL    Total Protein 7.5 6.0 - 8.5 g/dL    Albumin 4.00 3.50 - 5.20 g/dL    ALT (SGPT) 13 1 - 33 U/L    AST (SGOT) 13 1 - 32 U/L    Alkaline Phosphatase 107 39 - 117 U/L    Total Bilirubin 0.2 0.2 - 1.2 mg/dL    eGFR Non African Amer 81 >60 mL/min/1.73    Globulin 3.5 gm/dL    A/G Ratio 1.1 g/dL    BUN/Creatinine Ratio 15.5 7.0 - 25.0    Anion Gap 15.1 (H) 5.0 - 15.0 mmol/L   Protime-INR   Result Value Ref Range    Protime 11.8 11.7 - 14.2 Seconds    INR 0.90 0.90 - 1.10   CBC Auto Differential   Result Value Ref Range    WBC 11.64 (H) 3.40 - 10.80 10*3/mm3    RBC 3.43 (L) 3.77 - 5.28 10*6/mm3    Hemoglobin 11.0 (L) 12.0 - 15.9 g/dL    Hematocrit 31.6 (L) 34.0 - 46.6 %    MCV 92.1 79.0 - 97.0 fL    MCH 32.1 26.6 - 33.0 pg    MCHC 34.8 31.5 - 35.7 g/dL    RDW 13.2 12.3 - 15.4 %    RDW-SD 44.2 37.0 - 54.0 fl    MPV 10.7 6.0 - 12.0 fL    Platelets 161 140 - 450 10*3/mm3    Neutrophil % 84.3 (H) 42.7 - 76.0 %    Lymphocyte % 5.4 (L) 19.6 - 45.3 %    Monocyte % 9.5 5.0 - 12.0 %    Eosinophil % 0.0 (L) 0.3 - 6.2 %    Basophil % 0.2 0.0 - 1.5 %    Immature Grans % 0.6 (H) 0.0 - 0.5 %    Neutrophils, Absolute 9.81 (H) 1.70 - 7.00 10*3/mm3    Lymphocytes, Absolute 0.63 (L) 0.70 - 3.10 10*3/mm3    Monocytes, Absolute 1.11 (H) 0.10 - 0.90 10*3/mm3    Eosinophils, Absolute 0.00 0.00 - 0.40 10*3/mm3    Basophils, Absolute 0.02 0.00 - 0.20 10*3/mm3    Immature Grans, Absolute 0.07 (H) 0.00 - 0.05 10*3/mm3    nRBC 0.0 0.0 - 0.2 /100 WBC   CBC Auto Differential   Result Value  Ref Range    WBC 10.05 3.40 - 10.80 10*3/mm3    RBC 3.30 (L) 3.77 - 5.28 10*6/mm3    Hemoglobin 10.8 (L) 12.0 - 15.9 g/dL    Hematocrit 30.6 (L) 34.0 - 46.6 %    MCV 92.7 79.0 - 97.0 fL    MCH 32.7 26.6 - 33.0 pg    MCHC 35.3 31.5 - 35.7 g/dL    RDW 13.2 12.3 - 15.4 %    RDW-SD 44.7 37.0 - 54.0 fl    MPV 10.5 6.0 - 12.0 fL    Platelets 148 140 - 450 10*3/mm3    Neutrophil % 76.1 (H) 42.7 - 76.0 %    Lymphocyte % 11.6 (L) 19.6 - 45.3 %    Monocyte % 10.7 5.0 - 12.0 %    Eosinophil % 0.5 0.3 - 6.2 %    Basophil % 0.2 0.0 - 1.5 %    Immature Grans % 0.9 (H) 0.0 - 0.5 %    Neutrophils, Absolute 7.64 (H) 1.70 - 7.00 10*3/mm3    Lymphocytes, Absolute 1.17 0.70 - 3.10 10*3/mm3    Monocytes, Absolute 1.08 (H) 0.10 - 0.90 10*3/mm3    Eosinophils, Absolute 0.05 0.00 - 0.40 10*3/mm3    Basophils, Absolute 0.02 0.00 - 0.20 10*3/mm3    Immature Grans, Absolute 0.09 (H) 0.00 - 0.05 10*3/mm3    nRBC 0.0 0.0 - 0.2 /100 WBC   POC PH Fluid   Result Value Ref Range    pH, Fluid 7.50     Lot Number [id number]     Nitrazine Expiration Date 3/1/22    Type & Screen   Result Value Ref Range    ABO Type A     RH type Positive     Antibody Screen Negative     T&S Expiration Date 2020 11:59:59 PM        IMAGING:  None reviewed    ASSESSMENT:  This is a 25 y.o. female with:  1. She has a history of left lower extremity DVT in the popliteal and calf vein that was provoked following a left knee arthroscopic procedure as well as concomitant oral estrogen contraceptive use.  In addition, she is a heterozygote for the factor V Leiden mutation.    Prophylactic anticoagulation with Lovenox and heparin later in pregnancy.    2.   section 2020.  Resumed Lovenox prophylactically after that.    3.  Mild postpartum anemia with a hemoglobin of 10.8 on 2020.  Should improve with time.    PLAN:  Continue Lovenox until she has exhausted her supply.  She states that she has about 11 injections left.  Therefore, she will have been  anticoagulated for approximately 8 weeks after delivery.  No follow-up will be scheduled with us at this time.  Should she again become pregnant in the future we can assist with her anticoagulation at that time.  We will otherwise see her back as needed.

## 2020-08-10 ENCOUNTER — TELEMEDICINE (OUTPATIENT)
Dept: ONCOLOGY | Facility: CLINIC | Age: 25
End: 2020-08-10

## 2020-08-10 ENCOUNTER — POSTPARTUM VISIT (OUTPATIENT)
Dept: OBSTETRICS AND GYNECOLOGY | Age: 25
End: 2020-08-10

## 2020-08-10 VITALS
WEIGHT: 208.2 LBS | BODY MASS INDEX: 36.89 KG/M2 | HEIGHT: 63 IN | DIASTOLIC BLOOD PRESSURE: 70 MMHG | SYSTOLIC BLOOD PRESSURE: 118 MMHG

## 2020-08-10 DIAGNOSIS — Z86.718 HISTORY OF DVT (DEEP VEIN THROMBOSIS): Primary | ICD-10-CM

## 2020-08-10 DIAGNOSIS — D68.51 FACTOR V LEIDEN CARRIER (HCC): ICD-10-CM

## 2020-08-10 DIAGNOSIS — Z86.718 HISTORY OF DVT (DEEP VEIN THROMBOSIS): ICD-10-CM

## 2020-08-10 PROCEDURE — 99213 OFFICE O/P EST LOW 20 MIN: CPT | Performed by: INTERNAL MEDICINE

## 2020-08-10 PROCEDURE — 0503F POSTPARTUM CARE VISIT: CPT | Performed by: OBSTETRICS & GYNECOLOGY

## 2020-08-10 NOTE — PROGRESS NOTES
"Subjective   Ines Padron is a 25 y.o. female who presents for a postpartum visit. She is 6 weeks postpartum following a low cervical transverse  section. I have fully reviewed the prenatal and intrapartum course. The delivery was at 38 gestational weeks. Outcome: primary  section, low transverse incision. Anesthesia: epidural. Postpartum course has been uncomplicated. Baby's course has been normal. Baby is feeding by breast. Bleeding no bleeding. Bowel function is normal. Bladder function is normal. Patient is not sexually active. Contraception method is condoms. Postpartum depression screening: negative.    The following portions of the patient's history were reviewed and updated as appropriate: allergies, current medications, past family history, past medical history, past social history, past surgical history and problem list.    Review of Systems  Pertinent items are noted in HPI.    Objective   /70   Ht 160 cm (63\")   Wt 94.4 kg (208 lb 3.2 oz)   LMP 10/02/2019 (Exact Date)   Breastfeeding Yes   BMI 36.88 kg/m²    General:  alert, appears stated age, cooperative and no distress    Breasts:  def , breastfeeding   Lungs: clear to auscultation bilaterally   Heart:  regular rate and rhythm   Abdomen: soft, non-tender; bowel sounds normal; no masses,  no organomegaly and incision well-healed    Vulva:  normal   Vagina: normal vagina, no discharge, exudate, lesion, or erythema   Cervix:  no lesions   Corpus: normal size, contour, position, consistency, mobility, non-tender   Adnexa:  no mass, fullness, tenderness   Rectal Exam: Not performed.     Assessment/Plan   Normal postpartum exam. Pap smear done at today's visit.    1. Contraception: condoms, then vas  2. Pt saw hematologist and will continue lovenox until her supply runs out which will be about 8 weeks postop  3. Follow up in: 1 year or as needed. Advised pt pto call if she does not receive results of pap within 2 " weeks

## 2020-08-12 ENCOUNTER — TELEPHONE (OUTPATIENT)
Dept: OBSTETRICS AND GYNECOLOGY | Age: 25
End: 2020-08-12

## 2020-08-12 LAB
CONV .: NORMAL
CYTOLOGIST CVX/VAG CYTO: NORMAL
CYTOLOGY CVX/VAG DOC CYTO: NORMAL
CYTOLOGY CVX/VAG DOC THIN PREP: NORMAL
DX ICD CODE: NORMAL
HIV 1 & 2 AB SER-IMP: NORMAL
OTHER STN SPEC: NORMAL
STAT OF ADQ CVX/VAG CYTO-IMP: NORMAL

## 2020-08-12 NOTE — TELEPHONE ENCOUNTER
----- Message from Becky Mendez MD sent at 8/12/2020  3:36 PM EDT -----  Please call Ines, her pap is negative

## 2021-05-10 ENCOUNTER — TELEPHONE (OUTPATIENT)
Dept: OBSTETRICS AND GYNECOLOGY | Age: 26
End: 2021-05-10

## 2021-05-10 NOTE — TELEPHONE ENCOUNTER
Called pt regarding her email. Recommended she take a pregnancy test regarding the irregular bleeding episode and call back if positive. She does note she took a test prior to initial menses in April and that it was negative.     Discussed her other symptoms and she notes she is still having some chest pain that is worse with deep breath. Advised pt to proceed with ER given known hx of factor V leiden and hx of DVT. She agrees with plan.

## 2021-05-12 NOTE — TELEPHONE ENCOUNTER
Pt states pregnancy test was negative.  She is no longer having chest pains or any other symptoms other than irregular cycle.  Contraception = none  Cycle 04/24/2021 - 04/29/2021  Then started again on May 8th    She wanted to know what the next step should be - she is aware you are out of the office until Friday.

## 2021-05-27 ENCOUNTER — OFFICE VISIT (OUTPATIENT)
Dept: OBSTETRICS AND GYNECOLOGY | Age: 26
End: 2021-05-27

## 2021-05-27 VITALS
HEIGHT: 63 IN | SYSTOLIC BLOOD PRESSURE: 110 MMHG | BODY MASS INDEX: 40.57 KG/M2 | WEIGHT: 229 LBS | DIASTOLIC BLOOD PRESSURE: 72 MMHG

## 2021-05-27 DIAGNOSIS — Z13.1 SCREENING FOR DIABETES MELLITUS: ICD-10-CM

## 2021-05-27 DIAGNOSIS — N92.6 IRREGULAR BLEEDING: Primary | ICD-10-CM

## 2021-05-27 DIAGNOSIS — Z13.29 SCREENING FOR THYROID DISORDER: ICD-10-CM

## 2021-05-27 DIAGNOSIS — R63.8 INCREASED BMI: ICD-10-CM

## 2021-05-27 PROCEDURE — 99213 OFFICE O/P EST LOW 20 MIN: CPT | Performed by: OBSTETRICS & GYNECOLOGY

## 2021-05-27 PROCEDURE — 81025 URINE PREGNANCY TEST: CPT | Performed by: OBSTETRICS & GYNECOLOGY

## 2021-05-27 NOTE — PROGRESS NOTES
"Chief complaint:irregular bleeding    HPI  Ines Padron is a 26 y.o. female comes in for evaluation of irregular menses. She notes her menses started back after she weaned breastfeeding in December. She then started having regular menses about every 5 weeks. She then had a menses April 24 through the 30th then started again on May 8. She took a urine pregnancy test around 5/11 when she had irregular bleeding and it was negative. She is open to pregnancy currently and taking MVI with folic acid. Her son is doing well.        The following portions of the patient's history were reviewed and updated as appropriate: allergies, current medications, past family history, past medical history, past social history, past surgical history and problem list.    Review of Systems  Constitutional: negative  Genitourinary:positive for irregular bleeding    /72   Ht 160 cm (63\")   Wt 104 kg (229 lb)   LMP 05/08/2021 (Exact Date)   Breastfeeding No   BMI 40.57 kg/m²         Physical Exam  Constitutional:       Appearance: Normal appearance.   Pulmonary:      Effort: Pulmonary effort is normal.   Genitourinary:     Comments: No pain with tv imaging of uterus and adnexa  Neurological:      General: No focal deficit present.      Mental Status: She is alert and oriented to person, place, and time.   Psychiatric:         Mood and Affect: Mood normal.         Behavior: Behavior normal.     tv u/s: normal uterus with 3 layer appearance, ovaries with multiple small follicles bilaterally, larger follicle 2 cm on right.        Diagnoses and all orders for this visit:    1. Irregular bleeding (Primary)  -     HCG, B-subunit, Quantitative ([facility] Only)  -     TSH  -     POC Pregnancy, Urine    2. Screening for thyroid disorder  -     TSH    3. Screening for diabetes mellitus  -     Hemoglobin A1c      Ultrasound imaging shows normal 3 layer appearance of endometrium. Will check TSH and serum HCG but urine test today " negative as were pt's at home so early SAB less likely. Discussed possible oligo-ovulation given polycystic appearance of ovaries. Pt notes understanding. Will call with results of blood work.     Recommend screening for diabetes given increased BMI and pt agrees. Discussed wt loss and exercise and recommended pt hold on trying for pregnancy at this time. Discussed risks associated with increased BMI and pregnancy. Pt notes understanding and will consider.     25 minutes spent with pt reviewing u/s and history and providing counseling.

## 2021-05-28 ENCOUNTER — TELEPHONE (OUTPATIENT)
Dept: OBSTETRICS AND GYNECOLOGY | Age: 26
End: 2021-05-28

## 2021-05-28 LAB
HBA1C MFR BLD: 5.1 % (ref 4.8–5.6)
HCG INTACT+B SERPL-ACNC: <0.5 MIU/ML
TSH SERPL DL<=0.005 MIU/L-ACNC: 1.75 UIU/ML (ref 0.27–4.2)

## 2021-07-30 ENCOUNTER — IMMUNIZATION (OUTPATIENT)
Dept: VACCINE CLINIC | Facility: HOSPITAL | Age: 26
End: 2021-07-30

## 2021-07-30 PROCEDURE — 0001A: CPT | Performed by: INTERNAL MEDICINE

## 2021-07-30 PROCEDURE — 91300 HC SARSCOV02 VAC 30MCG/0.3ML IM: CPT | Performed by: INTERNAL MEDICINE

## 2021-08-20 ENCOUNTER — IMMUNIZATION (OUTPATIENT)
Dept: VACCINE CLINIC | Facility: HOSPITAL | Age: 26
End: 2021-08-20

## 2021-08-20 PROCEDURE — 91300 HC SARSCOV02 VAC 30MCG/0.3ML IM: CPT | Performed by: INTERNAL MEDICINE

## 2021-08-20 PROCEDURE — 0002A: CPT | Performed by: INTERNAL MEDICINE

## 2021-08-23 ENCOUNTER — OFFICE VISIT (OUTPATIENT)
Dept: OBSTETRICS AND GYNECOLOGY | Age: 26
End: 2021-08-23

## 2021-08-23 VITALS
HEIGHT: 63 IN | BODY MASS INDEX: 39.76 KG/M2 | WEIGHT: 224.4 LBS | DIASTOLIC BLOOD PRESSURE: 70 MMHG | SYSTOLIC BLOOD PRESSURE: 112 MMHG

## 2021-08-23 DIAGNOSIS — Z01.419 ENCOUNTER FOR GYNECOLOGICAL EXAMINATION: Primary | ICD-10-CM

## 2021-08-23 PROBLEM — H52.13 MYOPIA OF BOTH EYES: Status: ACTIVE | Noted: 2021-06-18

## 2021-08-23 PROCEDURE — 99395 PREV VISIT EST AGE 18-39: CPT | Performed by: OBSTETRICS & GYNECOLOGY

## 2021-08-23 NOTE — PROGRESS NOTES
".  Subjective     Chief Complaint   Patient presents with   • Gynecologic Exam     AE   LAST PAP 8/10/20-       History of Present Illness    Ines Padron is a 26 y.o.  who presents for annual exam.  Her menses are regular every 28-30 days, lasting 4-7 days, dysmenorrhea none   She notes menses became regular after she starting exercising more  She had covid vaccine and they have been well. Her son is doing well.  Obstetric History:  OB History        1    Para   1    Term   1       0    AB   0    Living   1       SAB   0    TAB   0    Ectopic   0    Molar   0    Multiple   0    Live Births   1               Menstrual History:     Patient's last menstrual period was 2021 (exact date).         Current contraception: condoms  History of abnormal Pap smear: no  Received Gardasil immunization: yes  Perform regular self breast exam: yes - reg  Family history of uterine or ovarian cancer: no  Family History of colon cancer: yes - MGF  Family history of breast cancer: no    Mammogram: not indicated.  Colonoscopy: not indicated.  DEXA: not indicated.    Exercise: moderately active  Calcium/Vitamin D: adequate intake    The following portions of the patient's history were reviewed and updated as appropriate: allergies, current medications, past family history, past medical history, past social history, past surgical history and problem list.    Review of Systems    Review of Systems   Constitutional: Negative for fatigue.   Respiratory: Negative for shortness of breath.    Gastrointestinal: Negative for abdominal pain.   Genitourinary: Negative for dysuria. Negative for abnormal bleeding   Neurological: Negative for headaches.   Psychiatric/Behavioral: Negative for dysphoric mood.         Objective   Physical Exam    /70   Ht 160 cm (63\")   Wt 102 kg (224 lb 6.4 oz)   LMP 2021 (Exact Date)   Breastfeeding No   BMI 39.75 kg/m²   General:   alert and no distress   Heart: " regular rate and rhythm   Lungs: clear to auscultation bilaterally   Breast: Inspection negative; no masses, retractions, nipple discharge or axillary adenopathy   Neck: Supple, no thyromegaly   Abdomen: soft, non-tender. No masses,  no organomegaly   Pelvis: External genitalia: normal general appearance  Urinary system: urethral meatus normal  Vaginal: normal mucosa without prolapse or lesions  Cervix: normal appearance  Adnexa: no masses or tenderness  Uterus: normal, nontender   Extremities: Extremities normal, atraumatic, no cyanosis or edema   Neurologic: Alert and oriented   Psychiatric: Normal affect, judgement, and mood     Assessment/Plan   Diagnoses and all orders for this visit:    1. Encounter for gynecological examination (Primary)        All questions answered.  Breast self exam technique reviewed and patient encouraged to perform self-exam monthly.  Discussed healthy lifestyle modifications.  Recommended 30 minutes of aerobic exercise five times per week.  Pap deferred as up to date and pt agrees.   She is happy with condoms for contraception. Spouse may get vas later.   Plan f/u 1 year or prn

## 2022-05-09 ENCOUNTER — TELEPHONE (OUTPATIENT)
Dept: OBSTETRICS AND GYNECOLOGY | Age: 27
End: 2022-05-09

## 2022-05-09 DIAGNOSIS — N92.6 LATE MENSES: Primary | ICD-10-CM

## 2022-05-09 NOTE — TELEPHONE ENCOUNTER
----- Message from Jenn Santos MA sent at 5/9/2022 10:47 AM EDT -----  Regarding: FW: Late period, negative tests  See message below   ----- Message -----  From: Ines Padron  Sent: 5/9/2022  10:47 AM EDT  [facility]  Subject: Late period, negative tests                      Good morning Dr. Mendez, I just wanted to message you to see if there is anything that can be done test wise about my late period. I am now 2 full weeks late with 3 negative pregnancy tests. This is odd being that since I have had Paxon I’ve been having my period within about 3 days of my normal start date if I am late at all. Any help is always appreciated! Thanks so much!

## 2022-05-12 LAB
HCG INTACT+B SERPL-ACNC: <1 MIU/ML
TSH SERPL DL<=0.005 MIU/L-ACNC: 2.84 UIU/ML (ref 0.45–4.5)

## 2022-08-29 ENCOUNTER — OFFICE VISIT (OUTPATIENT)
Dept: OBSTETRICS AND GYNECOLOGY | Age: 27
End: 2022-08-29

## 2022-08-29 VITALS
DIASTOLIC BLOOD PRESSURE: 80 MMHG | WEIGHT: 214 LBS | BODY MASS INDEX: 37.92 KG/M2 | HEIGHT: 63 IN | SYSTOLIC BLOOD PRESSURE: 122 MMHG

## 2022-08-29 DIAGNOSIS — Z01.419 WELL WOMAN EXAM WITH ROUTINE GYNECOLOGICAL EXAM: Primary | ICD-10-CM

## 2022-08-29 DIAGNOSIS — Z86.32 H/O GESTATIONAL DIABETES MELLITUS, NOT CURRENTLY PREGNANT: ICD-10-CM

## 2022-08-29 PROCEDURE — 99395 PREV VISIT EST AGE 18-39: CPT | Performed by: PHYSICIAN ASSISTANT

## 2022-08-29 RX ORDER — LACTIC ACID, L-, CITRIC ACID MONOHYDRATE, AND POTASSIUM BITARTRATE 90; 50; 20 MG/5G; MG/5G; MG/5G
1 GEL VAGINAL AS NEEDED
Qty: 5 G | Refills: 0 | COMMUNITY
Start: 2022-08-29 | End: 2023-02-09

## 2022-08-29 NOTE — PROGRESS NOTES
Subjective     Chief Complaint   Patient presents with   • Gynecologic Exam     Annual exam, Last Pap 08/10/2020 NEG, pt has no complaints today        History of Present Illness    Ines Padron is a 27 y.o.  who presents for annual exam.    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         Current contraception: condoms  History of abnormal Pap smear: no  Received Gardasil immunization: yes  Perform regular self breast exam: yes - occl  Family history of uterine or ovarian cancer: no  Family History of colon cancer: no  Family history of breast cancer: no    Mammogram: not indicated.  Colonoscopy: not indicated.  DEXA: not indicated.    Exercise: moderately active  Calcium/Vitamin D: adequate intake    The following portions of the patient's history were reviewed and updated as appropriate: allergies, current medications, past family history, past medical history, past social history, past surgical history and problem list.    Review of Systems   All other systems reviewed and are negative.      Review of Systems   Constitutional: Negative for fatigue.   Respiratory: Negative for shortness of breath.    Gastrointestinal: Negative for abdominal pain.   Genitourinary: Negative for dysuria.   Neurological: Negative for headaches.  "  Psychiatric/Behavioral: Negative for dysphoric mood.         Objective   Physical Exam    /80   Ht 160 cm (63\")   Wt 97.1 kg (214 lb)   LMP 08/24/2022 (Exact Date)   Breastfeeding No   BMI 37.91 kg/m²   General:   alert, comfortable and no distress   Heart: regular rate and rhythm   Lungs: clear to auscultation bilaterally   Breast: normal appearance, no masses or tenderness, Inspection negative, No nipple retraction or dimpling, No nipple discharge or bleeding, No axillary or supraclavicular adenopathy, Normal to palpation without dominant masses   Neck: no adenopathy and no carotid bruit   Abdomen: normal findings: soft, non-tender   CVA: Not performed today   Pelvis: External genitalia: normal general appearance  Vaginal: normal mucosa without prolapse or lesions  Cervix: normal appearance  Adnexa: normal bimanual exam  Uterus: normal single, nontender   Extremities: Not performed today   Neurologic: negative   Psychiatric: Normal affect, judgement, and mood     Assessment & Plan   Diagnoses and all orders for this visit:    1. Well woman exam with routine gynecological exam (Primary)    2. H/O gestational diabetes mellitus, not currently pregnant    Other orders  -     Lactic Ac-Citric Ac-Pot Bitart (Phexxi) 1.8-1-0.4 % gel; Insert 1 applicator into the vagina As Needed (IC).  Dispense: 5 g; Refill: 0        All questions answered.  Breast self exam technique reviewed and patient encouraged to perform self-exam monthly.  Discussed healthy lifestyle modifications.  Recommended 30 minutes of aerobic exercise five times per week.  Discussed calcium needs to prevent osteoporosis.    Pap utd  Recommend f/u on blood sugars  Sample of Phexxi given, call if would like rx sent in               "

## 2023-02-09 ENCOUNTER — OFFICE VISIT (OUTPATIENT)
Dept: OBSTETRICS AND GYNECOLOGY | Age: 28
End: 2023-02-09
Payer: COMMERCIAL

## 2023-02-09 VITALS
BODY MASS INDEX: 34.73 KG/M2 | DIASTOLIC BLOOD PRESSURE: 70 MMHG | WEIGHT: 196 LBS | HEIGHT: 63 IN | SYSTOLIC BLOOD PRESSURE: 120 MMHG

## 2023-02-09 DIAGNOSIS — Z11.3 SCREEN FOR STD (SEXUALLY TRANSMITTED DISEASE): Primary | ICD-10-CM

## 2023-02-09 PROCEDURE — 99213 OFFICE O/P EST LOW 20 MIN: CPT | Performed by: PHYSICIAN ASSISTANT

## 2023-02-09 RX ORDER — CLOTRIMAZOLE AND BETAMETHASONE DIPROPIONATE 10; .64 MG/G; MG/G
CREAM TOPICAL EVERY 12 HOURS SCHEDULED
Qty: 15 G | Refills: 0 | Status: SHIPPED | OUTPATIENT
Start: 2023-02-09

## 2023-02-09 NOTE — PROGRESS NOTES
"Subjective     Chief Complaint   Patient presents with   • Gynecologic Exam     c/o std testing       Ines Padron is a 27 y.o.  whose LMP is Patient's last menstrual period was 2023 (exact date). presents for std testing  She found out her partner of 8 years was sleeping with other woman  He is the father of her child as well  She denies any sx's of infection other then some mild itching in the perineal region  It comes and goes  She uses a topical monistat to treat the sxs  Denies d/c or other sxs  Would like serum std testing as well      No Additional Complaints Reported    The following portions of the patient's history were reviewed and updated as appropriate:vital signs, allergies, current medications, past family history, past medical history, past social history, past surgical history and problem list      Review of Systems   Genitourinary:positive for vaginal itching and std testing     Objective      /70   Ht 160 cm (63\")   Wt 88.9 kg (196 lb)   LMP 2023 (Exact Date)   BMI 34.72 kg/m²     Physical Exam    General:   alert, comfortable and no distress   Heart: Not performed today   Lungs: Not performed today.   Breast: Not performed today   Neck: Not performed today   Abdomen: Not performed today   CVA: Not performed today   Pelvis: External genitalia: normal general appearance  Vaginal: normal mucosa without prolapse or lesions, inflamed mucosa and at the 6 o'clock area  Cervix: normal appearance and GC prep obtained   Extremities: Not performed today   Neurologic: negative   Psychiatric: Normal affect, judgement, and mood       Lab Review   Labs: No data reviewed    Imaging   No data reviewed    Assessment & Plan     ASSESSMENT  1. Screen for STD (sexually transmitted disease)          PLAN  1.   Orders Placed This Encounter   Procedures   • Chlamydia trachomatis, Neisseria gonorrhoeae, Trichomonas vaginalis, PCR - Swab, Cervix   • RPR, Rfx Qn RPR / Confirm TP   • " Hepatitis B Surface Antigen   • Hepatitis C Antibody   • HIV-1 / O / 2 Ag / Antibody 4th Generation       2. Medications prescribed this encounter:        New Medications Ordered This Visit   Medications   • clotrimazole-betamethasone (Lotrisone) 1-0.05 % cream     Sig: Apply  topically to the appropriate area as directed Every 12 (Twelve) Hours.     Dispense:  15 g     Refill:  0       3. Mild inflammation noted, will treat with lotrisone.      4. Serum and vaginal testing done today. Will call with results.     Follow up: GIANNI Tinoco  2/9/2023

## 2023-02-10 LAB
HBV SURFACE AG SERPL QL IA: NEGATIVE
HCV AB S/CO SERPL IA: 0.1 S/CO RATIO (ref 0–0.9)
HIV 1+2 AB+HIV1 P24 AG SERPL QL IA: NON REACTIVE
RPR SER QL: NON REACTIVE

## 2023-02-11 LAB
C TRACH RRNA SPEC QL NAA+PROBE: NEGATIVE
N GONORRHOEA RRNA SPEC QL NAA+PROBE: NEGATIVE
T VAGINALIS RRNA SPEC QL NAA+PROBE: NEGATIVE

## 2023-05-11 ENCOUNTER — APPOINTMENT (OUTPATIENT)
Dept: CT IMAGING | Facility: HOSPITAL | Age: 28
End: 2023-05-11
Payer: COMMERCIAL

## 2023-05-11 ENCOUNTER — APPOINTMENT (OUTPATIENT)
Dept: GENERAL RADIOLOGY | Facility: HOSPITAL | Age: 28
End: 2023-05-11
Payer: COMMERCIAL

## 2023-05-11 ENCOUNTER — HOSPITAL ENCOUNTER (EMERGENCY)
Facility: HOSPITAL | Age: 28
Discharge: HOME | End: 2023-05-11
Attending: EMERGENCY MEDICINE | Admitting: EMERGENCY MEDICINE
Payer: COMMERCIAL

## 2023-05-11 VITALS
RESPIRATION RATE: 16 BRPM | OXYGEN SATURATION: 99 % | DIASTOLIC BLOOD PRESSURE: 68 MMHG | HEIGHT: 63 IN | TEMPERATURE: 97.4 F | SYSTOLIC BLOOD PRESSURE: 110 MMHG | WEIGHT: 196 LBS | BODY MASS INDEX: 34.73 KG/M2 | HEART RATE: 87 BPM

## 2023-05-11 DIAGNOSIS — H53.9 VISUAL CHANGES: ICD-10-CM

## 2023-05-11 DIAGNOSIS — D68.51 FACTOR V LEIDEN: ICD-10-CM

## 2023-05-11 DIAGNOSIS — R51.9 ACUTE NONINTRACTABLE HEADACHE, UNSPECIFIED HEADACHE TYPE: Primary | ICD-10-CM

## 2023-05-11 LAB
ABO GROUP BLD: NORMAL
ALBUMIN SERPL-MCNC: 4 G/DL (ref 3.5–5.2)
ALBUMIN/GLOB SERPL: 1.3 G/DL
ALP SERPL-CCNC: 58 U/L (ref 39–117)
ALT SERPL W P-5'-P-CCNC: 13 U/L (ref 1–33)
ANION GAP SERPL CALCULATED.3IONS-SCNC: 8 MMOL/L (ref 5–15)
APTT PPP: 27.1 SECONDS (ref 22.7–35.4)
AST SERPL-CCNC: 15 U/L (ref 1–32)
BASOPHILS # BLD AUTO: 0.03 10*3/MM3 (ref 0–0.2)
BASOPHILS NFR BLD AUTO: 0.4 % (ref 0–1.5)
BILIRUB SERPL-MCNC: 0.4 MG/DL (ref 0–1.2)
BLD GP AB SCN SERPL QL: NEGATIVE
BUN SERPL-MCNC: 15 MG/DL (ref 6–20)
BUN/CREAT SERPL: 21.4 (ref 7–25)
CALCIUM SPEC-SCNC: 8.8 MG/DL (ref 8.6–10.5)
CHLORIDE SERPL-SCNC: 103 MMOL/L (ref 98–107)
CO2 SERPL-SCNC: 27 MMOL/L (ref 22–29)
CREAT SERPL-MCNC: 0.7 MG/DL (ref 0.57–1)
DEPRECATED RDW RBC AUTO: 40.3 FL (ref 37–54)
EGFRCR SERPLBLD CKD-EPI 2021: 120.2 ML/MIN/1.73
EOSINOPHIL # BLD AUTO: 0.18 10*3/MM3 (ref 0–0.4)
EOSINOPHIL NFR BLD AUTO: 2.6 % (ref 0.3–6.2)
ERYTHROCYTE [DISTWIDTH] IN BLOOD BY AUTOMATED COUNT: 12.2 % (ref 12.3–15.4)
GLOBULIN UR ELPH-MCNC: 3.1 GM/DL
GLUCOSE SERPL-MCNC: 97 MG/DL (ref 65–99)
HCT VFR BLD AUTO: 39.1 % (ref 34–46.6)
HGB BLD-MCNC: 13.6 G/DL (ref 12–15.9)
HOLD SPECIMEN: NORMAL
HOLD SPECIMEN: NORMAL
IMM GRANULOCYTES # BLD AUTO: 0.02 10*3/MM3 (ref 0–0.05)
IMM GRANULOCYTES NFR BLD AUTO: 0.3 % (ref 0–0.5)
INR PPP: 0.95 (ref 0.9–1.1)
LYMPHOCYTES # BLD AUTO: 2.4 10*3/MM3 (ref 0.7–3.1)
LYMPHOCYTES NFR BLD AUTO: 34 % (ref 19.6–45.3)
MCH RBC QN AUTO: 31.5 PG (ref 26.6–33)
MCHC RBC AUTO-ENTMCNC: 34.8 G/DL (ref 31.5–35.7)
MCV RBC AUTO: 90.5 FL (ref 79–97)
MONOCYTES # BLD AUTO: 0.51 10*3/MM3 (ref 0.1–0.9)
MONOCYTES NFR BLD AUTO: 7.2 % (ref 5–12)
NEUTROPHILS NFR BLD AUTO: 3.91 10*3/MM3 (ref 1.7–7)
NEUTROPHILS NFR BLD AUTO: 55.5 % (ref 42.7–76)
NRBC BLD AUTO-RTO: 0 /100 WBC (ref 0–0.2)
PLATELET # BLD AUTO: 269 10*3/MM3 (ref 140–450)
PMV BLD AUTO: 9.7 FL (ref 6–12)
POTASSIUM SERPL-SCNC: 3.9 MMOL/L (ref 3.5–5.2)
PROT SERPL-MCNC: 7.1 G/DL (ref 6–8.5)
PROTHROMBIN TIME: 12.7 SECONDS (ref 11.7–14.2)
RBC # BLD AUTO: 4.32 10*6/MM3 (ref 3.77–5.28)
RH BLD: POSITIVE
SODIUM SERPL-SCNC: 138 MMOL/L (ref 136–145)
T&S EXPIRATION DATE: NORMAL
TROPONIN T SERPL HS-MCNC: <6 NG/L
WBC NRBC COR # BLD: 7.05 10*3/MM3 (ref 3.4–10.8)
WHOLE BLOOD HOLD COAG: NORMAL
WHOLE BLOOD HOLD SPECIMEN: NORMAL

## 2023-05-11 PROCEDURE — 25510000001 IOPAMIDOL PER 1 ML: Performed by: EMERGENCY MEDICINE

## 2023-05-11 PROCEDURE — 86850 RBC ANTIBODY SCREEN: CPT | Performed by: EMERGENCY MEDICINE

## 2023-05-11 PROCEDURE — 85730 THROMBOPLASTIN TIME PARTIAL: CPT | Performed by: EMERGENCY MEDICINE

## 2023-05-11 PROCEDURE — 93005 ELECTROCARDIOGRAM TRACING: CPT | Performed by: EMERGENCY MEDICINE

## 2023-05-11 PROCEDURE — 99285 EMERGENCY DEPT VISIT HI MDM: CPT

## 2023-05-11 PROCEDURE — 70498 CT ANGIOGRAPHY NECK: CPT

## 2023-05-11 PROCEDURE — 85025 COMPLETE CBC W/AUTO DIFF WBC: CPT | Performed by: EMERGENCY MEDICINE

## 2023-05-11 PROCEDURE — 86901 BLOOD TYPING SEROLOGIC RH(D): CPT | Performed by: EMERGENCY MEDICINE

## 2023-05-11 PROCEDURE — 0042T HC CT CEREBRAL PERFUSION W/WO CONTRAST: CPT

## 2023-05-11 PROCEDURE — 84484 ASSAY OF TROPONIN QUANT: CPT | Performed by: EMERGENCY MEDICINE

## 2023-05-11 PROCEDURE — 99203 OFFICE O/P NEW LOW 30 MIN: CPT | Performed by: PSYCHIATRY & NEUROLOGY

## 2023-05-11 PROCEDURE — 70496 CT ANGIOGRAPHY HEAD: CPT

## 2023-05-11 PROCEDURE — 86900 BLOOD TYPING SEROLOGIC ABO: CPT | Performed by: EMERGENCY MEDICINE

## 2023-05-11 PROCEDURE — 96360 HYDRATION IV INFUSION INIT: CPT

## 2023-05-11 PROCEDURE — 96361 HYDRATE IV INFUSION ADD-ON: CPT

## 2023-05-11 PROCEDURE — 71045 X-RAY EXAM CHEST 1 VIEW: CPT

## 2023-05-11 PROCEDURE — 85610 PROTHROMBIN TIME: CPT | Performed by: EMERGENCY MEDICINE

## 2023-05-11 PROCEDURE — 80053 COMPREHEN METABOLIC PANEL: CPT | Performed by: EMERGENCY MEDICINE

## 2023-05-11 RX ORDER — SODIUM CHLORIDE 0.9 % (FLUSH) 0.9 %
10 SYRINGE (ML) INJECTION AS NEEDED
Status: DISCONTINUED | OUTPATIENT
Start: 2023-05-11 | End: 2023-05-11 | Stop reason: HOSPADM

## 2023-05-11 RX ORDER — SODIUM CHLORIDE 9 MG/ML
125 INJECTION, SOLUTION INTRAVENOUS CONTINUOUS
Status: DISCONTINUED | OUTPATIENT
Start: 2023-05-11 | End: 2023-05-11 | Stop reason: HOSPADM

## 2023-05-11 RX ADMIN — SODIUM CHLORIDE 125 ML/HR: 9 INJECTION, SOLUTION INTRAVENOUS at 10:29

## 2023-05-11 RX ADMIN — SODIUM CHLORIDE 500 ML: 9 INJECTION, SOLUTION INTRAVENOUS at 09:23

## 2023-05-11 RX ADMIN — IOPAMIDOL 150 ML: 755 INJECTION, SOLUTION INTRAVENOUS at 09:58

## 2023-05-11 NOTE — ED NOTES
States last night had HA, blurred vision in RT eye and difficulty speaking X 2 hours then resolved. Still C/O HA but denies other SX

## 2023-05-11 NOTE — DISCHARGE INSTRUCTIONS
I was we discussed, return if symptoms return, any new pain, fever, any new neurologic deficits such as speech change, vision change or any new weakness in arms or legs.

## 2023-05-11 NOTE — CONSULTS
Neurology Consult Note    Referring Provider: Dr. Salcedo  Reason for Consultation: stroke like symptoms    History of present illness:    The patient is 27 year old woman who developed right eye visual difficulty and headache with speech difficulty yesterday evening. She went to bed and this morning still has headache. Vision and speech are back to normal.    The vision difficulty is described as blurred vision, with no scotomatous symptoms.    She has a history of Factor V leiden heterozygosity, tested in  after post surgical DVT.    The patient is on no antiplatelet or anticoagulation therapy at home.    She does not have a history of migraines. Her mother has headaches but patient does not know if these are migraines.    Currently headache has resolved.    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    Medications  Scheduled Meds:   Continuous Infusions:sodium chloride, 125 mL/hr      PRN Meds:.•  sodium chloride    Vital Signs   Temp:  [97.4 °F (36.3 °C)] 97.4 °F (36.3 °C)  Heart Rate:  [89-90] 89  Resp:  [16] 16  BP: (127-134)/(81-84) 134/84    Examination:  General: Well-groomed, well-nourished  HEENT:  Neck supple, no rash   CVS:  Regular rate and rhythm. Good peripheral perfusion.   Resp: Unlabored breathing  Extremities:  No signs of  peripheral edema  Skin:  No rash  Neurologic:   Alert oriented and fluent   Funduscopic exam benign   VF intact to finger movement   No dysarthria   EOMF without nystagmus   Pupils symmetric and equally reactive   Face symmetric   Normal power in all extremities proximally and distally   Normal coordination   Reflexes symmetric and not hyperactive   Plantar responses flexor   Sensory exam grossly intact   Gait not tested      Psychiatric: no anxiety    Results Review:  Results from last 7 days   Lab Units 05/11/23  0918   WBC 10*3/mm3 7.05   HEMOGLOBIN g/dL 13.6   HEMATOCRIT % 39.1   PLATELETS 10*3/mm3 269        Results from last 7 days   Lab Units 05/11/23  0918   SODIUM mmol/L 138   POTASSIUM mmol/L 3.9   CHLORIDE mmol/L 103   CO2 mmol/L 27.0   BUN mg/dL 15   CREATININE mg/dL 0.70   CALCIUM mg/dL 8.8   BILIRUBIN mg/dL 0.4   ALK PHOS U/L 58   ALT (SGPT) U/L 13   AST (SGOT) U/L 15   GLUCOSE mg/dL 97     Radiology  images reviewed independently and discussed with Dr. Chin  Head CT shows no acute pathology     CTA/CTV  head/neck shows no LVO, no critical stenoses, no aneurysm, no venous thrombosis    CT perfusion shows no acute ischemia    Medical Decision Making and Recommendations  Transient neurologic symptoms  Resolved  Associated with headache  Possibly a migraine complex    Imaging benign including CT venogram of the head    Exam benign  No further imaging indicated.    Stable for discharge    Follow up with PCP  Follow up with neurology if develops recurrent headaches    I discussed these findings and my recommendations with patient and Dr. Salcedo.      Cinthia Paige MD  05/11/23  10:11 EDT

## 2023-05-11 NOTE — ED PROVIDER NOTES
EMERGENCY DEPARTMENT ENCOUNTER    Room Number:  40/40  Date of encounter:  5/11/2023  PCP: Provider, No Known  Historian: Patient  Relevant information and history provided by sources other than the patient will be included below and in the ED Course.  Review of pertinent past medical records may also be included in record below and ED Course.    HPI:  Chief Complaint: Trouble speaking, trouble seeing, headache  A complete HPI/ROS/PMH/PSH/SH/FH are unobtainable due to: Not applicable  Context: Ines Padron is a 27 y.o. female who presents to the ED c/o patient finished playing volleyball last night at about 8 AM.  Soon after finishing playing volleyball she noticed that she had abnormal vision in her right eye.  She felt that it was blurry.  She noticed that it was blurry in her right eye only.  Her headache initially started off as mild.  She felt as if this could have been due to hunger.  She then had something to eat and drove home and felt that the headache got progressively worse over several hours.  The headache again was in the occipital scalp.  Feels like an ache.  Felt deep down inside.  She also noticed at the same time over this few hours as if she had trouble speaking.  She felt like she had a hard time finding words and getting words out.  This is completely different than what is normal for her.  She denied any slurred speech or garbled speech.  She had no weakness to arms or legs and she had no facial droop that she was aware of.  She also had no sensory changes.  She was able to stand and walk.  After she ate there was no change in her symptoms.  She did end up going to bed and then when she awoke this morning the headache was still present and worse.  The vision changes almost normalized.  She felt like her speech had normalized as well.  She has had no trauma or injury that she is aware of.  Again this started after she played volleyball last night.  But she not aware of any injury  related to that.  Denies any chest pain or shortness of breath.  She has no history of previous headaches or migraines.  Denies any abdominal pain.  Denies the possibility of pregnancy.  She has had no fevers or chills or coughs or colds.  She has a diagnosis of factor V Leiden deficiency.  She is not supposed to be on anticoagulation long-term.  The clot she had in her left leg in the past occurred after surgery.  This was about 15 to 20 years ago.  She has had no other clot that she is aware of        Previous Episodes: No  Current Symptoms: Currently headache is mild.  Feels as if her vision is almost back to normal.  Feels as if her speech is back to normal.    MEDICAL HISTORY REVIEWED  Factor V Leiden deficiency.  Allergies      PAST MEDICAL HISTORY  Active Ambulatory Problems     Diagnosis Date Noted   • History of DVT (deep vein thrombosis) 2019   • Increased BMI 2019   • Factor V Leiden carrier 2020   • Myopia of both eyes 2021     Resolved Ambulatory Problems     Diagnosis Date Noted   • Factor V Leiden 10/30/2017   • Group B Streptococcus urinary tract infection affecting pregnancy, antepartum 2019   • 28 weeks gestation of pregnancy 2020   • Large for dates 2020   • Elevated glucose tolerance test 04/15/2020   • Diet controlled gestational diabetes mellitus (GDM), antepartum 2020   • Failure to progress in labor 2020   •  delivery delivered 2020   • Fetal intolerance to labor, delivered, current hospitalization 2020     Past Medical History:   Diagnosis Date   • Asthma    • DVT (deep venous thrombosis)    • Gestational diabetes    • IBS (irritable bowel syndrome)          PAST SURGICAL HISTORY  Past Surgical History:   Procedure Laterality Date   •  SECTION N/A 2020    Procedure:  SECTION PRIMARY;  Surgeon: Becky Mendez MD;  Location: [facility] LABOR DELIVERY;  Service: Obstetrics/Gynecology;  Laterality:  N/A;   • KNEE ARTHROSCOPY  05/2012   • KNEE SURGERY  09/2012         FAMILY HISTORY  Family History   Problem Relation Age of Onset   • Colon cancer Maternal Grandfather    • Hypertension Mother    • Hypertension Father    • Diabetes Father    • Breast cancer Neg Hx    • Ovarian cancer Neg Hx    • Uterine cancer Neg Hx          SOCIAL HISTORY  Social History     Socioeconomic History   • Marital status: Single   • Number of children: 0   • Years of education: College   Tobacco Use   • Smoking status: Never     Passive exposure: Never   • Smokeless tobacco: Never   Vaping Use   • Vaping Use: Never used   Substance and Sexual Activity   • Alcohol use: Not Currently     Comment: occasional   • Drug use: No   • Sexual activity: Yes     Partners: Male     Birth control/protection: Coitus interruptus         ALLERGIES  Patient has no known allergies.        REVIEW OF SYSTEMS  Review of Systems     All systems reviewed and negative except for those discussed in HPI.       PHYSICAL EXAM    I have reviewed the triage vital signs and nursing notes.    ED Triage Vitals [05/11/23 0859]   Temp Heart Rate Resp BP SpO2   97.4 °F (36.3 °C) 90 16 -- 100 %      Temp src Heart Rate Source Patient Position BP Location FiO2 (%)   Tympanic -- -- -- --       GENERAL: Young female no acute distress.Vital signs on my initial evaluation unremarkable.    HENT: nares patent  Head/neck/ face are symmetric without gross deformity, signs of trauma, or swelling  EYES: no scleral icterus, no conjunctival pallor.  NECK: Supple, no meningismus  CV: regular rhythm, regular rate with intact distal pulses.  No murmur rub  RESPIRATORY: normal effort and no respiratory distress.  Clear to auscultation bilateral  ABDOMEN: soft and nontender.  Obese  MUSCULOSKELETAL: no deformity.  Good strong intact distal pulses to upper and lower extremities are equal strong and symmetric  NEURO: alert and appropriate, moves all extremities, follows commands.  Please see  NIH stroke scale.  The partial hemianopsia in the left eye is very mild.  She just feels like her vision is a little bit impaired in the left eye in the nasal region  SKIN: warm, dry    Vital signs and nursing notes reviewed.        LAB RESULTS  Recent Results (from the past 24 hour(s))   Comprehensive Metabolic Panel    Collection Time: 05/11/23  9:18 AM    Specimen: Blood   Result Value Ref Range    Glucose 97 65 - 99 mg/dL    BUN 15 6 - 20 mg/dL    Creatinine 0.70 0.57 - 1.00 mg/dL    Sodium 138 136 - 145 mmol/L    Potassium 3.9 3.5 - 5.2 mmol/L    Chloride 103 98 - 107 mmol/L    CO2 27.0 22.0 - 29.0 mmol/L    Calcium 8.8 8.6 - 10.5 mg/dL    Total Protein 7.1 6.0 - 8.5 g/dL    Albumin 4.0 3.5 - 5.2 g/dL    ALT (SGPT) 13 1 - 33 U/L    AST (SGOT) 15 1 - 32 U/L    Alkaline Phosphatase 58 39 - 117 U/L    Total Bilirubin 0.4 0.0 - 1.2 mg/dL    Globulin 3.1 gm/dL    A/G Ratio 1.3 g/dL    BUN/Creatinine Ratio 21.4 7.0 - 25.0    Anion Gap 8.0 5.0 - 15.0 mmol/L    eGFR 120.2 >60.0 mL/min/1.73   Protime-INR    Collection Time: 05/11/23  9:18 AM    Specimen: Blood   Result Value Ref Range    Protime 12.7 11.7 - 14.2 Seconds    INR 0.95 0.90 - 1.10   aPTT    Collection Time: 05/11/23  9:18 AM    Specimen: Blood   Result Value Ref Range    PTT 27.1 22.7 - 35.4 seconds   Single High Sensitivity Troponin T    Collection Time: 05/11/23  9:18 AM    Specimen: Blood   Result Value Ref Range    HS Troponin T <6 <10 ng/L   Type & Screen    Collection Time: 05/11/23  9:18 AM    Specimen: Blood   Result Value Ref Range    ABO Type A     RH type Positive     Antibody Screen Negative     T&S Expiration Date 5/14/2023 11:59:59 PM    Green Top (Gel)    Collection Time: 05/11/23  9:18 AM   Result Value Ref Range    Extra Tube Hold for add-ons.    Lavender Top    Collection Time: 05/11/23  9:18 AM   Result Value Ref Range    Extra Tube hold for add-on    Gold Top - SST    Collection Time: 05/11/23  9:18 AM   Result Value Ref Range    Extra  Tube Hold for add-ons.    Light Blue Top    Collection Time: 05/11/23  9:18 AM   Result Value Ref Range    Extra Tube Hold for add-ons.    CBC Auto Differential    Collection Time: 05/11/23  9:18 AM    Specimen: Blood   Result Value Ref Range    WBC 7.05 3.40 - 10.80 10*3/mm3    RBC 4.32 3.77 - 5.28 10*6/mm3    Hemoglobin 13.6 12.0 - 15.9 g/dL    Hematocrit 39.1 34.0 - 46.6 %    MCV 90.5 79.0 - 97.0 fL    MCH 31.5 26.6 - 33.0 pg    MCHC 34.8 31.5 - 35.7 g/dL    RDW 12.2 (L) 12.3 - 15.4 %    RDW-SD 40.3 37.0 - 54.0 fl    MPV 9.7 6.0 - 12.0 fL    Platelets 269 140 - 450 10*3/mm3    Neutrophil % 55.5 42.7 - 76.0 %    Lymphocyte % 34.0 19.6 - 45.3 %    Monocyte % 7.2 5.0 - 12.0 %    Eosinophil % 2.6 0.3 - 6.2 %    Basophil % 0.4 0.0 - 1.5 %    Immature Grans % 0.3 0.0 - 0.5 %    Neutrophils, Absolute 3.91 1.70 - 7.00 10*3/mm3    Lymphocytes, Absolute 2.40 0.70 - 3.10 10*3/mm3    Monocytes, Absolute 0.51 0.10 - 0.90 10*3/mm3    Eosinophils, Absolute 0.18 0.00 - 0.40 10*3/mm3    Basophils, Absolute 0.03 0.00 - 0.20 10*3/mm3    Immature Grans, Absolute 0.02 0.00 - 0.05 10*3/mm3    nRBC 0.0 0.0 - 0.2 /100 WBC   ECG 12 Lead Stroke Evaluation    Collection Time: 05/11/23  9:24 AM   Result Value Ref Range    QT Interval 393 ms       Ordered the above labs and independently reviewed the results.        RADIOLOGY  XR Chest 1 View    Result Date: 5/11/2023  XR CHEST 1 VW-  HISTORY: Female who is 27 years-old,  stroke  TECHNIQUE: Frontal view of the chest  COMPARISON: 01/07/2020  FINDINGS: Heart, mediastinum and pulmonary vasculature are unremarkable. No focal pulmonary consolidation, pleural effusion, or pneumothorax. No acute osseous process.      No evidence for acute pulmonary process. Follow-up as clinical indications persist.  This report was finalized on 5/11/2023 10:18 AM by Dr. Xiang Nathan M.D.      CT Angiogram Head w AI Analysis of LVO, CT Angiogram Neck, CT CEREBRAL PERFUSION WITH & WITHOUT CONTRAST    Result  Date: 5/11/2023  EMERGENCY CONTRAST-ENHANCED CT ANGIOGRAM OF THE HEAD AND NECK AND CT PERFUSION STUDY OF THE BRAIN ON 05/11/2023  CLINICAL HISTORY: Patient had acute onset blurred vision, trouble speaking after playing volleyball last night lasted for 2 hours. The patient has a factor V Leiden clotting disorder.  HEAD CT TECHNIQUE: Spiral CT images were obtained from the base of the skull to the vertex without intravenous contrast. The images were reformatted and submitted in 2 mm thick axial sagittal and coronal CT sections with brain algorithm.  There are no prior head CTs for comparison.  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 Otherwise, the brain parenchyma is normal in attenuation. The ventricles are normal in size. I see no focal mass effect. There is no midline shift. No extra-axial fluid collections are identified. There is no evidence of acute intracranial hemorrhage. The calvarium and skull base are normal in appearance. The paranasal sinuses and mastoid air cells and middle ear cavities are clear.      No acute intracranial abnormality is identified. 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 The remainder of the head CT is normal with no acute completed infarct and no intracranial hemorrhage identified. The results were communicated to Dr. Lock the stroke neurologist while the patient was still on our scanner on 05/11/2023 at 9:43 AM and she requested a contrast-enhanced CT angiogram of the head and neck CT perfusion study of the brain and CT venogram of the head to further evaluate.  CONTRAST-ENHANCED CT ANGIOGRAM OF THE HEAD AND NECK AND CT PERFUSION STUDY OF THE BRAIN AND CT VENOGRAM OF THE HEAD TECHNIQUE: Spiral CT images were obtained from the mid cerebellum up through the cerebral hemispheres for a 10 cm vertical slab of brain imaging during arterial phase of contrast and images were reformatted and analyzed with rapid software analysis for CT  perfusion study brain. Subsequently spiral CT images were obtained from the top of the aortic arch up through the great vessels of the head and neck during arterial phase of contrast and images were reformatted and submitted in 1 mm thick axial sagittal and coronal CT sections with soft tissue algorithm and 3D reconstructions were performed to complete the CT angiogram of the head and neck and subsequently delayed postcontrast CT images were obtained from the vertex of the skull inferiorly to the C2 cervical level and these images were reformatted and submitted in 1 mm thick axial CT section 2 mm thick sagittal and coronal reconstructions were performed to complete the CT angiogram of the head. Delayed postcontrast images were obtained through the head and reformatted in 3 mm thick axial CT section with brain algorithm and 2 mm thick sagittal and coronal reconstructions were performed submitted in brain algorithm.  FINDINGS: CT perfusion study of the brain. The CT perfusion study of the brain is limited to 10 cm vertical slab of brain imaging that extends from the mid cerebellum up through the cerebral hemispheres and excludes the superior 1 to 2 cm of the cerebral hemispheres as well as inferior half of the mid cerebellum inferior jay jay and the entire medulla which are excluded from the field of imaging and not assessed. Within the 10 some vertical slab of brain imaging I see no areas of reduced cerebral blood flow to less than 30% of normal and no acute completed infarct. I see no areas of delayed time to maximal enhancement of greater than 6 seconds and thus no ishaan hypoperfused brain.  CT ANGIOGRAM OF THE NECK: The nasopharynx is normal. There is some mild prominence in size of the palatine tonsils likely hyperplastic otherwise the oropharynx, hypopharynx, true cords and subglottic airway are normal in appearance. Thyroid gland enhances homogeneously and is normal in appearance. The lung apices are clear. The  parotid,  parapharyngeal and submandibular spaces are symmetric and are normal in appearance. There are minimally prominent level 2 suprahyoid jugulodigastric chain lymph nodes lymph nodes measuring up to 15 x 14 x 25 mm bilaterally likely benign reactive nodes. The cervical spine is within normal limits. There is anatomic origin of great vessels off the aortic arch. The left subclavian artery origin is normal in appearance and no stenosis is seen in the left subclavian artery. The left vertebral artery origin is normal in appearance. No stenosis is seen in the left vertebral artery from its origin to the vertebrobasilar junction. The left common carotid origin is normal in appearance and no stenosis is seen in the left common carotid artery and its bifurcation in the left internal and external carotid arteries is normal in appearance and no stenosis is seen in the cervical segment of the left internal carotid artery using the NASCET criteria. The brachiocephalic artery origin is normal in appearance and no stenosis seen in the brachycephalic artery and its bifurcation into the right subclavian and common carotid arteries normal in appearance. No stenosis is seen in the right subclavian artery. The right vertebral artery origin is normal in appearance and no stenosis seen in the right vertebral artery from its origin to the vertebrobasilar junction. The right common carotid origin is normal in appearance and no stenosis seen in the right common carotid artery and its bifurcation into the right internal and external carotid arteries is normal in appearance and no stenosis is seen in the cervical segment of the right internal carotid artery using the NASCET criteria.  CT ANGIOGRAM OF THE HEAD: The intracranial V4 segments of the vertebral arteries are widely patent without stenosis to the vertebrobasilar junction. The posterior inferior cerebellar arteries are normal in appearance. The basilar artery and  basilar tips normal in appearance. The posterior cerebral and superior cerebellar arteries are normal in appearance. The upper cervical, petrous, cavernous and supracavernous segments of internal carotid arteries are normal in appearance. The A1 segments of the anterior cerebral arteries and the anterior communicating artery origin and A2 branches of the anterior cerebral arteries are normal in appearance. The M1 segments of middle cerebral arteries and middle cerebral artery bifurcations are visualized M2 branches and the sylvian fissure are normal in appearance.  CT VENOGRAM: The superior sagittal sinus, internal cerebral veins, vein of Mitch, straight sinus are normal in appearance. The right transverse and sigmoid sinus and upper cervical right internal jugular veins normal in appearance. The left transverse sinus is small in diameter but is widely patent and then there is meconium in attenuation moderately narrowing the distal left transverse sinus. This left sigmoid and sinus and upper cervical internal jugular veins normal in appearance.  DELAYED POSTCONTRAST HEAD CT: On the delayed postcontrast head CT images I see no abnormal enhancement in the head.  IMPRESSION: 1. CT of the head without contrast demonstrates no acute intracranial abnormality. Incidental note is made of a 12 x 8 x 8 mm benign-appearing pineal cyst felt to be of no significance. The remainder of the head CT is normal with no discernible acute completed infarct and no intracranial hemorrhage identified. The results of the noncontrast head CT were communicated to Cinthia Paige, the stroke neurologist taking care of the patient by telephone on 05/11/2023 at 9:43 AM and she requested a contrast-enhanced CT angiogram of the head and neck and CT perfusion study of the brain and head CT venogram of the head. 2. The CT perfusion study of the brain is limited to 10 cm vertical slab of brain imaging from the mid cerebellum up through the majority  of the cerebral hemispheres and excludes the superior 2 cm of the cerebral hemispheres as well as the inferior half of the cerebellum as well as the inferior jay jay and medulla which are excluded from the field of imaging and not evaluated. Within the 10 cm vertical slab of brain imaging I see no areas of reduced cerebral blood flow to less than 30% of normal and thus no acute completed infarct and no areas of delayed time to maximal enhancement of greater than 6 seconds and thus no ishaan hypoperfused brain. 3. The CT angiogram of the head and neck is normal with no stenosis seen in the great vessels of the neck. In particular no stenosis is seen in the vertebral arteries and no stenosis is seen in the cervical segments of internal carotid arteries using NASCET criteria. Furthermore, no intracranial vascular stenoses or occlusions are seen. 4. CT venogram of the head is within normal limits with no evidence of dural venous sinus thrombosis. 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  AI analysis of LVO was utilized.  Radiation dose reduction techniques were utilized, including automated exposure control and exposure modulation based on body size.         I ordered the above noted radiological studies. Reviewed by me and discussed with radiologist.  See dictation for official radiology interpretation.      PROCEDURES    Procedures      MEDICATIONS GIVEN IN ER    Medications   sodium chloride 0.9 % bolus 500 mL (0 mL Intravenous Stopped 5/11/23 1029)   iopamidol (ISOVUE-370) 76 % injection 150 mL (150 mL Intravenous Given 5/11/23 0958)         All labs have been independently reviewed by me.  All radiology studies have been reviewed by me and I discussed with radiologist dictating the report when indicated below.  All EKG's independently viewed and interpreted by me.  Discussion below represents my analysis of pertinent findings related  to patient's condition, differential diagnosis, treatment plan and final disposition.        PROGRESS, DATA ANALYSIS, CONSULTS, AND MEDICAL DECISION MAKING    This is a young female with history of clotting disorder that started last night with vision change, speech change and occipital headache.  Currently her headache is very mild.  Obviously given her past history and history of DVT we have to make sure that she has not had any stroke.  She is at increased risk factor for that.  She is not on any long-term anticoagulation and has only had 1 clotting episode in the past as mentioned above with a DVT.  We also have to make sure she does not have a venous sinus thrombosis so we will do a CT venogram as well.  Her headache is mild.  She does not want anything for the headache at this point.  I informed her of the initial treatment plan.  All questions answered.  I will consult stroke neurologist as well.    ED Course as of 05/11/23 1656   Thu May 11, 2023   0921 My stroke exam and NIH stroke scale was done right after entering the room within a couple minutes. [MM]   0922 I spoke with the stroke neurologist Dr. Arguello.  Informed of the patient's presenting symptoms.  She agrees with the treatment plan of doing a CT angiogram.  Also see if they can do a CT venogram given her history of factor V Leiden deficiency and history of DVT in the distant past.  She is not a thrombolytic candidate.  All questions answered at this time. [MM]   0928 My own independent interpretation of the EKG that was done at 9:24 AM reveals a rate of 82 it is sinus rhythm and is narrow complex with normal axis no acute injury pattern and QT appears normal  Compared to previous EKG that was done on January 7, 2020 and I do not appreciate any significant change. [MM]   1108 I discussed the case with with the radiologist Dr. Chin.  The CT angiogram and CT venogram and CT perfusion are all unremarkable.  There is no signs of any clot and no signs  of any perfusion deficit.  No hemorrhage.  Please see complete dictated report from radiologist. [MM]   1108 The stroke neurologist Dr. Rodriguez came and saw and evaluated the patient.  Her exam was normal with no obvious deficit.  She feels good that this patient is good to be discharged home.  Very unlikely that this patient has had any stroke or any acute emergent condition. [MM]   1108 Again I discussed the case with the stroke neurologist after evaluation. [MM]   1145 On my reevaluation of the patient her symptoms have all 100% resolved.  She has no visual change.  No headache, and no speech impairment. [MM]   1146 I think this is very unlikely any CVA or dural venous thrombosis given the results of the test and the exam.  I did give her 1 an NIH stroke scale which was very equivocal.  She has strange visual change.  And it was not consistent on repeat exam.  Patient's visual acuity was 20/25 and that was corrected with her glasses.  In discussion with the stroke neurologist and her evaluation the results of the test we feel very comfortable discharging the patient home.  Gave her strict instructions to return if the symptoms return or if she has any other concerns.  Patient agrees with that plan.  All questions answered. [MM]   1148 Discussion with the patient she is well aware that no test is 100% and mind in the neurologist clinical evaluation thinks this is very unlikely anything serious occurring.  Gave her strict instructions to return if she has any return of her symptoms, pain, fever or any other concerns.  Patient understands limitations and agrees with that plan. [MM]      ED Course User Index  [MM] Dom Salcedo MD       AS OF 16:56 EDT VITALS:    BP - 110/68  HR - 87  TEMP - 97.4 °F (36.3 °C) (Tympanic)  02 SATS - 99%    SOCIAL DETERMINANTS OF HEALTH THAT IMPACT OR LIMIT CARE (For example..Homelessness,safe discharge, inability to obtain care, follow up, or prescriptions):      DIAGNOSIS  Final  diagnoses:   Acute nonintractable headache, unspecified headache type   Visual changes: Transient   Factor V Leiden: Heterozygous         DISPOSITION  DISCHARGE    Patient discharged in stable condition.    Reviewed implications of results, diagnosis, meds, responsibility to follow up, warning signs and symptoms of possible worsening, potential complications and reasons to return to ER, including worsening of symptoms, return of symptoms, any new pain, fever, chest pain, shortness of breath, any new neurologic symptoms such as new speech change, any new weakness in arms or legs, or any other concerns..    Patient/Family voiced understanding of above instructions.    Discussed plan for discharge, as there is no emergent indication for admission. Pt/family is agreeable and understands need for follow up and repeat testing.  Pt is aware that discharge does not mean that nothing is wrong but it indicates no emergency is present that requires admission and they must continue care with follow-up as given below or physician of their choice.     FOLLOW-UP  PATIENT CONNECTION - [facility] [id number]  [phone]    Call today to arrange follow-up with a new primary care provider if you do not have 1.  Return if symptoms return, pain, fever any new weakness, any new speech change or visual change or any other concerns.         Medication List      No changes were made to your prescriptions during this visit.                 DICTATED UTILIZING DRAGON DICTATION    Note Disclaimer: At [facility], we believe that sharing information builds trust and better relationships. You are receiving this note because you recently visited [facility]. It is possible you will see health information before a provider has talked with you about it. This kind of information can be easy to misunderstand. To help you fully understand what it means for your health, we urge you to discuss this note with your provider.      Dom Salcedo MD  05/11/23 9914

## 2023-05-14 LAB — QT INTERVAL: 393 MS

## 2023-09-11 ENCOUNTER — OFFICE VISIT (OUTPATIENT)
Dept: OBSTETRICS AND GYNECOLOGY | Age: 28
End: 2023-09-11
Payer: COMMERCIAL

## 2023-09-11 VITALS
DIASTOLIC BLOOD PRESSURE: 74 MMHG | WEIGHT: 219.2 LBS | SYSTOLIC BLOOD PRESSURE: 126 MMHG | BODY MASS INDEX: 38.84 KG/M2 | HEIGHT: 63 IN

## 2023-09-11 DIAGNOSIS — Z01.419 ENCOUNTER FOR GYNECOLOGICAL EXAMINATION: Primary | ICD-10-CM

## 2023-09-11 DIAGNOSIS — Z11.51 ENCOUNTER FOR SCREENING FOR HUMAN PAPILLOMAVIRUS (HPV): ICD-10-CM

## 2023-09-11 NOTE — PROGRESS NOTES
".Subjective     Chief Complaint   Patient presents with    Gynecologic Exam     Annual exam, Last Pap 08/10/2020 NEG, Pt has no complaints today, Doing well        History of Present Illness    Ines Padron is a 28 y.o.  who presents for annual exam.  Her menses are regular every 28-30 days, lasting 4-7 days, dysmenorrhea none   She denies any gyn issues.     She is now single and doing well. She has started dating someone new. Her son is 3 yo and likes football and cars/dinosaurs.   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   Obstetric History:  OB History          1    Para   1    Term   1       0    AB   0    Living   1         SAB   0    IAB   0    Ectopic   0    Molar   0    Multiple   0    Live Births   1               Menstrual History:     Patient's last menstrual period was 2023 (exact date).         Current contraception: condoms  History of abnormal Pap smear: no  Received Gardasil immunization: yes  Perform regular self breast exam:  yes  Family history of uterine or ovarian cancer: no  Family History of colon cancer: yes - MGF  Family history of breast cancer: no    Mammogram: not indicated.  Colonoscopy: not indicated.  DEXA: not indicated.    Exercise: moderately active  Calcium/Vitamin D: adequate intake    The following portions of the patient's history were reviewed and updated as appropriate: allergies, current medications, past family history, past medical history, past social history, past surgical history, and problem list.    Review of Systems    Review of Systems   Constitutional: Negative for fatigue.   Respiratory: Negative for shortness of breath.    Gastrointestinal: Negative for abdominal pain.   Genitourinary: Negative for abnormal bleeding  Neurological: Negative for severe headaches.   Psychiatric/Behavioral: Negative for dysphoric mood.         Objective   Physical Exam    /74   Ht 160 cm (63\")   Wt 99.4 kg (219 lb 3.2 oz)   LMP " 08/21/2023 (Exact Date)   BMI 38.83 kg/m²   General:   Alert, in no distress   Heart: regular rate and rhythm   Lungs: clear to auscultation bilaterally   Breast: Inspection negative; no masses, retractions, nipple discharge or axillary adenopathy in either breast   Neck: Supple, no thyromegaly   Abdomen: Soft, no tenderness or guarding   Pelvis: External genitalia: normal general appearance  Urinary system: urethral meatus normal  Vaginal: normal mucosa without prolapse or lesions  Cervix: normal appearance  Adnexa: no masses or tenderness  Uterus: normal, nontender   Extremities: Normal without edema   Neurologic: Alert and oriented   Psychiatric: Normal affect, judgment and mood     Assessment & Plan   Diagnoses and all orders for this visit:    1. Encounter for gynecological examination (Primary)  -     IGP, Apt HPV,rfx 16 / 18,45    2. Encounter for screening for human papillomavirus (HPV)  -     IGP, Apt HPV,rfx 16 / 18,45        All questions answered.  Breast self exam technique reviewed and patient encouraged to perform self-exam monthly.  Discussed healthy lifestyle modifications.  Recommended 30 minutes of aerobic exercise five times per week.  Advised pt to call if she does not receive results of pap within 2 weeks

## 2023-09-13 LAB
CYTOLOGIST CVX/VAG CYTO: NORMAL
CYTOLOGY CVX/VAG DOC CYTO: NORMAL
CYTOLOGY CVX/VAG DOC THIN PREP: NORMAL
DX ICD CODE: NORMAL
HIV 1 & 2 AB SER-IMP: NORMAL
HPV I/H RISK 4 DNA CVX QL PROBE+SIG AMP: NEGATIVE
OTHER STN SPEC: NORMAL
STAT OF ADQ CVX/VAG CYTO-IMP: NORMAL

## 2024-09-20 ENCOUNTER — OFFICE VISIT (OUTPATIENT)
Dept: OBSTETRICS AND GYNECOLOGY | Age: 29
End: 2024-09-20
Payer: COMMERCIAL

## 2024-09-20 VITALS
DIASTOLIC BLOOD PRESSURE: 72 MMHG | HEIGHT: 63 IN | WEIGHT: 220.2 LBS | SYSTOLIC BLOOD PRESSURE: 120 MMHG | BODY MASS INDEX: 39.02 KG/M2

## 2024-09-20 DIAGNOSIS — Z11.51 ENCOUNTER FOR SCREENING FOR HUMAN PAPILLOMAVIRUS (HPV): ICD-10-CM

## 2024-09-20 DIAGNOSIS — Z01.419 ENCOUNTER FOR GYNECOLOGICAL EXAMINATION: Primary | ICD-10-CM

## 2024-09-20 RX ORDER — CLOTRIMAZOLE AND BETAMETHASONE DIPROPIONATE 10; .64 MG/G; MG/G
CREAM TOPICAL EVERY 12 HOURS SCHEDULED
Qty: 15 G | Refills: 0 | Status: SHIPPED | OUTPATIENT
Start: 2024-09-20

## 2024-09-20 RX ORDER — PNV NO.95/FERROUS FUM/FOLIC AC 28MG-0.8MG
1 TABLET ORAL DAILY
COMMUNITY

## 2024-09-25 LAB
CYTOLOGIST CVX/VAG CYTO: NORMAL
CYTOLOGY CVX/VAG DOC CYTO: NORMAL
CYTOLOGY CVX/VAG DOC THIN PREP: NORMAL
DX ICD CODE: NORMAL
HPV I/H RISK 4 DNA CVX QL PROBE+SIG AMP: NEGATIVE
Lab: NORMAL
OTHER STN SPEC: NORMAL
STAT OF ADQ CVX/VAG CYTO-IMP: NORMAL

## 2025-01-16 ENCOUNTER — APPOINTMENT (OUTPATIENT)
Dept: CT IMAGING | Facility: HOSPITAL | Age: 30
End: 2025-01-16
Payer: COMMERCIAL

## 2025-01-16 ENCOUNTER — HOSPITAL ENCOUNTER (EMERGENCY)
Facility: HOSPITAL | Age: 30
Discharge: HOME | End: 2025-01-17
Attending: EMERGENCY MEDICINE
Payer: COMMERCIAL

## 2025-01-16 DIAGNOSIS — J10.1 INFLUENZA A: ICD-10-CM

## 2025-01-16 DIAGNOSIS — N39.0 URINARY TRACT INFECTION WITHOUT HEMATURIA, SITE UNSPECIFIED: Primary | ICD-10-CM

## 2025-01-16 LAB
ALBUMIN SERPL-MCNC: 4.2 G/DL (ref 3.5–5.2)
ALBUMIN/GLOB SERPL: 1.2 G/DL
ALP SERPL-CCNC: 51 U/L (ref 39–117)
ALT SERPL W P-5'-P-CCNC: 15 U/L (ref 1–33)
ANION GAP SERPL CALCULATED.3IONS-SCNC: 13 MMOL/L (ref 5–15)
AST SERPL-CCNC: 17 U/L (ref 1–32)
B PARAPERT DNA SPEC QL NAA+PROBE: NOT DETECTED
B PERT DNA SPEC QL NAA+PROBE: NOT DETECTED
BACTERIA UR QL AUTO: ABNORMAL /HPF
BASOPHILS # BLD AUTO: 0.01 10*3/MM3 (ref 0–0.2)
BASOPHILS NFR BLD AUTO: 0.1 % (ref 0–1.5)
BILIRUB SERPL-MCNC: 0.3 MG/DL (ref 0–1.2)
BILIRUB UR QL STRIP: NEGATIVE
BUN SERPL-MCNC: 12 MG/DL (ref 6–20)
BUN/CREAT SERPL: 19.7 (ref 7–25)
C PNEUM DNA NPH QL NAA+NON-PROBE: NOT DETECTED
CALCIUM SPEC-SCNC: 9.6 MG/DL (ref 8.6–10.5)
CHLORIDE SERPL-SCNC: 102 MMOL/L (ref 98–107)
CLARITY UR: ABNORMAL
CO2 SERPL-SCNC: 22 MMOL/L (ref 22–29)
COLOR UR: YELLOW
CREAT SERPL-MCNC: 0.61 MG/DL (ref 0.57–1)
D-LACTATE SERPL-SCNC: 0.7 MMOL/L (ref 0.5–2)
DEPRECATED RDW RBC AUTO: 39.6 FL (ref 37–54)
EGFRCR SERPLBLD CKD-EPI 2021: 122.8 ML/MIN/1.73
EOSINOPHIL # BLD AUTO: 0.02 10*3/MM3 (ref 0–0.4)
EOSINOPHIL NFR BLD AUTO: 0.3 % (ref 0.3–6.2)
ERYTHROCYTE [DISTWIDTH] IN BLOOD BY AUTOMATED COUNT: 12.2 % (ref 12.3–15.4)
FLUAV H3 RNA NPH QL NAA+PROBE: DETECTED
FLUBV RNA ISLT QL NAA+PROBE: NOT DETECTED
GLOBULIN UR ELPH-MCNC: 3.5 GM/DL
GLUCOSE SERPL-MCNC: 104 MG/DL (ref 65–99)
GLUCOSE UR STRIP-MCNC: NEGATIVE MG/DL
HADV DNA SPEC NAA+PROBE: NOT DETECTED
HCG SERPL QL: NEGATIVE
HCOV 229E RNA SPEC QL NAA+PROBE: NOT DETECTED
HCOV HKU1 RNA SPEC QL NAA+PROBE: NOT DETECTED
HCOV NL63 RNA SPEC QL NAA+PROBE: NOT DETECTED
HCOV OC43 RNA SPEC QL NAA+PROBE: NOT DETECTED
HCT VFR BLD AUTO: 39.8 % (ref 34–46.6)
HGB BLD-MCNC: 14.2 G/DL (ref 12–15.9)
HGB UR QL STRIP.AUTO: NEGATIVE
HMPV RNA NPH QL NAA+NON-PROBE: NOT DETECTED
HOLD SPECIMEN: NORMAL
HPIV1 RNA ISLT QL NAA+PROBE: NOT DETECTED
HPIV2 RNA SPEC QL NAA+PROBE: NOT DETECTED
HPIV3 RNA NPH QL NAA+PROBE: NOT DETECTED
HPIV4 P GENE NPH QL NAA+PROBE: NOT DETECTED
HYALINE CASTS UR QL AUTO: ABNORMAL /LPF
IMM GRANULOCYTES # BLD AUTO: 0.02 10*3/MM3 (ref 0–0.05)
IMM GRANULOCYTES NFR BLD AUTO: 0.3 % (ref 0–0.5)
KETONES UR QL STRIP: ABNORMAL
LEUKOCYTE ESTERASE UR QL STRIP.AUTO: ABNORMAL
LIPASE SERPL-CCNC: 26 U/L (ref 13–60)
LYMPHOCYTES # BLD AUTO: 0.35 10*3/MM3 (ref 0.7–3.1)
LYMPHOCYTES NFR BLD AUTO: 5.2 % (ref 19.6–45.3)
M PNEUMO IGG SER IA-ACNC: NOT DETECTED
MCH RBC QN AUTO: 32 PG (ref 26.6–33)
MCHC RBC AUTO-ENTMCNC: 35.7 G/DL (ref 31.5–35.7)
MCV RBC AUTO: 89.6 FL (ref 79–97)
MONOCYTES # BLD AUTO: 0.71 10*3/MM3 (ref 0.1–0.9)
MONOCYTES NFR BLD AUTO: 10.6 % (ref 5–12)
NEUTROPHILS NFR BLD AUTO: 5.6 10*3/MM3 (ref 1.7–7)
NEUTROPHILS NFR BLD AUTO: 83.5 % (ref 42.7–76)
NITRITE UR QL STRIP: NEGATIVE
NRBC BLD AUTO-RTO: 0 /100 WBC (ref 0–0.2)
PH UR STRIP.AUTO: 6 [PH] (ref 5–8)
PLATELET # BLD AUTO: 251 10*3/MM3 (ref 140–450)
PMV BLD AUTO: 9.8 FL (ref 6–12)
POTASSIUM SERPL-SCNC: 3.8 MMOL/L (ref 3.5–5.2)
PROT SERPL-MCNC: 7.7 G/DL (ref 6–8.5)
PROT UR QL STRIP: ABNORMAL
RBC # BLD AUTO: 4.44 10*6/MM3 (ref 3.77–5.28)
RBC # UR STRIP: ABNORMAL /HPF
REF LAB TEST METHOD: ABNORMAL
RHINOVIRUS RNA SPEC NAA+PROBE: NOT DETECTED
RSV RNA NPH QL NAA+NON-PROBE: NOT DETECTED
SARS-COV-2 RNA NPH QL NAA+NON-PROBE: NOT DETECTED
SODIUM SERPL-SCNC: 137 MMOL/L (ref 136–145)
SP GR UR STRIP: >1.03 (ref 1–1.03)
SQUAMOUS #/AREA URNS HPF: ABNORMAL /HPF
UROBILINOGEN UR QL STRIP: ABNORMAL
WBC # UR STRIP: ABNORMAL /HPF
WBC NRBC COR # BLD AUTO: 6.71 10*3/MM3 (ref 3.4–10.8)
WHOLE BLOOD HOLD COAG: YES
WHOLE BLOOD HOLD SPECIMEN: NORMAL

## 2025-01-16 PROCEDURE — 96375 TX/PRO/DX INJ NEW DRUG ADDON: CPT

## 2025-01-16 PROCEDURE — 74176 CT ABD & PELVIS W/O CONTRAST: CPT

## 2025-01-16 PROCEDURE — 83605 ASSAY OF LACTIC ACID: CPT | Performed by: EMERGENCY MEDICINE

## 2025-01-16 PROCEDURE — 36415 COLL VENOUS BLD VENIPUNCTURE: CPT

## 2025-01-16 PROCEDURE — 85025 COMPLETE CBC W/AUTO DIFF WBC: CPT

## 2025-01-16 PROCEDURE — 80053 COMPREHEN METABOLIC PANEL: CPT

## 2025-01-16 PROCEDURE — 25810000003 SODIUM CHLORIDE 0.9 % SOLUTION: Performed by: EMERGENCY MEDICINE

## 2025-01-16 PROCEDURE — 25010000002 CEFTRIAXONE PER 250 MG: Performed by: EMERGENCY MEDICINE

## 2025-01-16 PROCEDURE — 25010000002 ONDANSETRON PER 1 MG: Performed by: EMERGENCY MEDICINE

## 2025-01-16 PROCEDURE — 96365 THER/PROPH/DIAG IV INF INIT: CPT

## 2025-01-16 PROCEDURE — 84703 CHORIONIC GONADOTROPIN ASSAY: CPT

## 2025-01-16 PROCEDURE — 0202U NFCT DS 22 TRGT SARS-COV-2: CPT | Performed by: EMERGENCY MEDICINE

## 2025-01-16 PROCEDURE — 87086 URINE CULTURE/COLONY COUNT: CPT | Performed by: EMERGENCY MEDICINE

## 2025-01-16 PROCEDURE — 81001 URINALYSIS AUTO W/SCOPE: CPT | Performed by: EMERGENCY MEDICINE

## 2025-01-16 PROCEDURE — 83690 ASSAY OF LIPASE: CPT

## 2025-01-16 PROCEDURE — 99284 EMERGENCY DEPT VISIT MOD MDM: CPT

## 2025-01-16 RX ORDER — ACETAMINOPHEN 500 MG
1000 TABLET ORAL ONCE
Status: COMPLETED | OUTPATIENT
Start: 2025-01-16 | End: 2025-01-16

## 2025-01-16 RX ORDER — SODIUM CHLORIDE 0.9 % (FLUSH) 0.9 %
10 SYRINGE (ML) INJECTION AS NEEDED
Status: DISCONTINUED | OUTPATIENT
Start: 2025-01-16 | End: 2025-01-17 | Stop reason: HOSPADM

## 2025-01-16 RX ORDER — ONDANSETRON 2 MG/ML
4 INJECTION INTRAMUSCULAR; INTRAVENOUS ONCE
Status: COMPLETED | OUTPATIENT
Start: 2025-01-16 | End: 2025-01-16

## 2025-01-16 RX ADMIN — ONDANSETRON 4 MG: 2 INJECTION, SOLUTION INTRAMUSCULAR; INTRAVENOUS at 20:33

## 2025-01-16 RX ADMIN — CEFTRIAXONE 2000 MG: 2 INJECTION, POWDER, FOR SOLUTION INTRAMUSCULAR; INTRAVENOUS at 22:21

## 2025-01-16 RX ADMIN — SODIUM CHLORIDE 1000 ML: 9 INJECTION, SOLUTION INTRAVENOUS at 23:23

## 2025-01-16 RX ADMIN — ACETAMINOPHEN 1000 MG: 500 TABLET, FILM COATED ORAL at 20:34

## 2025-01-16 RX ADMIN — SODIUM CHLORIDE 1000 ML: 9 INJECTION, SOLUTION INTRAVENOUS at 20:50

## 2025-01-17 VITALS
OXYGEN SATURATION: 100 % | HEART RATE: 87 BPM | DIASTOLIC BLOOD PRESSURE: 80 MMHG | RESPIRATION RATE: 24 BRPM | SYSTOLIC BLOOD PRESSURE: 137 MMHG | TEMPERATURE: 98.4 F

## 2025-01-17 RX ORDER — CEPHALEXIN 500 MG/1
500 CAPSULE ORAL 2 TIMES DAILY
Qty: 10 CAPSULE | Refills: 0 | Status: SHIPPED | OUTPATIENT
Start: 2025-01-17 | End: 2025-01-22

## 2025-01-17 RX ORDER — ONDANSETRON 4 MG/1
4 TABLET, ORALLY DISINTEGRATING ORAL EVERY 8 HOURS PRN
Qty: 9 TABLET | Refills: 0 | Status: SHIPPED | OUTPATIENT
Start: 2025-01-17

## 2025-01-17 NOTE — ED PROVIDER NOTES
EMERGENCY DEPARTMENT ENCOUNTER    Room Number:    PCP: Provider, No Known  Patient Care Team:  Provider, No Known as PCP - Dajuan Martinez MD as Consulting Physician (Hematology and Oncology)  Becky Mendez MD as Referring Physician (Obstetrics and Gynecology)  Dom Moreira MD as Consulting Physician (Family Medicine)   Independent Historians: Patient, family    HPI:  Chief Complaint: Fever, low back pain    A complete HPI/ROS/PMH/PSH/SH/FH are unobtainable due to: None    Chronic or social conditions impacting patient care (Social Determinants of Health): None  (Financial Resource Strain / Food Insecurity / Transportation Needs / Physical Activity / Stress / Social Connections / Intimate Partner Violence / Housing Stability)    Context: Ines Padron is a 29 y.o. female who presents to the ED c/o acute fever and low back pain that started today while at work.  Sports of nausea vomiting.  Patient denies any shortness of breath no cough or significant congestion.  No numbness or tingling.  No chest pain.    Review of prior external notes (non-ED) -and- Review of prior external test results outside of this encounter: I reviewed patient's OB/GYN note from 2024    Prescription drug monitoring program review:         PAST MEDICAL HISTORY  Active Ambulatory Problems     Diagnosis Date Noted    History of DVT (deep vein thrombosis) 2019    Increased BMI 2019    Factor V Leiden carrier 2020    Myopia of both eyes 2021     Resolved Ambulatory Problems     Diagnosis Date Noted    Factor V Leiden 10/30/2017    Group B Streptococcus urinary tract infection affecting pregnancy, antepartum 2019    28 weeks gestation of pregnancy 2020    Large for dates 2020    Elevated glucose tolerance test 04/15/2020    Diet controlled gestational diabetes mellitus (GDM), antepartum 2020    Failure to progress in labor 2020     delivery delivered  2020    Fetal intolerance to labor, delivered, current hospitalization 2020     Past Medical History:   Diagnosis Date    Asthma     DVT (deep venous thrombosis)     Gestational diabetes     IBS (irritable bowel syndrome)          PAST SURGICAL HISTORY  Past Surgical History:   Procedure Laterality Date     SECTION N/A 2020    Procedure:  SECTION PRIMARY;  Surgeon: Becky Mendez MD;  Location: [facility] LABOR DELIVERY;  Service: Obstetrics/Gynecology;  Laterality: N/A;    KNEE ARTHROSCOPY  2012    KNEE SURGERY  2012         FAMILY HISTORY  Family History   Problem Relation Age of Onset    Hypertension Father     Diabetes Father     Hypertension Mother     Colon cancer Maternal Grandfather     Colon cancer Maternal Uncle     Breast cancer Neg Hx     Ovarian cancer Neg Hx     Uterine cancer Neg Hx          SOCIAL HISTORY  Social History     Socioeconomic History    Marital status: Single    Number of children: 0    Years of education: College   Tobacco Use    Smoking status: Never     Passive exposure: Never    Smokeless tobacco: Never   Vaping Use    Vaping status: Never Used   Substance and Sexual Activity    Alcohol use: Not Currently     Comment: occasional    Drug use: No    Sexual activity: Yes     Partners: Male     Birth control/protection: Coitus interruptus         ALLERGIES  Patient has no known allergies.        REVIEW OF SYSTEMS  Review of Systems  Included in HPI  All systems reviewed and negative except for those discussed in HPI.      PHYSICAL EXAM    I have reviewed the triage vital signs and nursing notes.    ED Triage Vitals   Temp Heart Rate Resp BP SpO2   25   100.3 °F (37.9 °C) (!) 133 16 129/83 100 %      Temp src Heart Rate Source Patient Position BP Location FiO2 (%)   25 -- --   Tympanic Monitor Sitting         Physical Exam  GENERAL: alert, mild  acute distress  SKIN: Warm, dry  HENT: Normocephalic, atraumatic  EYES: no scleral icterus  CV: regular rhythm, regular rate  RESPIRATORY: normal effort, lungs clear  ABDOMEN: soft, nontender, nondistended  MUSCULOSKELETAL: no deformity  NEURO: alert, moves all extremities, follows commands                                                               LAB RESULTS  Recent Results (from the past 24 hours)   Comprehensive Metabolic Panel    Collection Time: 01/16/25  7:15 PM    Specimen: Blood   Result Value Ref Range    Glucose 104 (H) 65 - 99 mg/dL    BUN 12 6 - 20 mg/dL    Creatinine 0.61 0.57 - 1.00 mg/dL    Sodium 137 136 - 145 mmol/L    Potassium 3.8 3.5 - 5.2 mmol/L    Chloride 102 98 - 107 mmol/L    CO2 22.0 22.0 - 29.0 mmol/L    Calcium 9.6 8.6 - 10.5 mg/dL    Total Protein 7.7 6.0 - 8.5 g/dL    Albumin 4.2 3.5 - 5.2 g/dL    ALT (SGPT) 15 1 - 33 U/L    AST (SGOT) 17 1 - 32 U/L    Alkaline Phosphatase 51 39 - 117 U/L    Total Bilirubin 0.3 0.0 - 1.2 mg/dL    Globulin 3.5 gm/dL    A/G Ratio 1.2 g/dL    BUN/Creatinine Ratio 19.7 7.0 - 25.0    Anion Gap 13.0 5.0 - 15.0 mmol/L    eGFR 122.8 >60.0 mL/min/1.73   Lipase    Collection Time: 01/16/25  7:15 PM    Specimen: Blood   Result Value Ref Range    Lipase 26 13 - 60 U/L   hCG, Serum, Qualitative    Collection Time: 01/16/25  7:15 PM    Specimen: Blood   Result Value Ref Range    HCG Qualitative Negative Negative   Green Top (Gel)    Collection Time: 01/16/25  7:15 PM   Result Value Ref Range    Extra Tube Hold for add-ons.    Lavender Top    Collection Time: 01/16/25  7:15 PM   Result Value Ref Range    Extra Tube hold for add-on    Light Blue Top    Collection Time: 01/16/25  7:15 PM   Result Value Ref Range    Extra Tube YES    CBC Auto Differential    Collection Time: 01/16/25  7:15 PM    Specimen: Blood   Result Value Ref Range    WBC 6.71 3.40 - 10.80 10*3/mm3    RBC 4.44 3.77 - 5.28 10*6/mm3    Hemoglobin 14.2 12.0 - 15.9 g/dL    Hematocrit 39.8 34.0 - 46.6  %    MCV 89.6 79.0 - 97.0 fL    MCH 32.0 26.6 - 33.0 pg    MCHC 35.7 31.5 - 35.7 g/dL    RDW 12.2 (L) 12.3 - 15.4 %    RDW-SD 39.6 37.0 - 54.0 fl    MPV 9.8 6.0 - 12.0 fL    Platelets 251 140 - 450 10*3/mm3    Neutrophil % 83.5 (H) 42.7 - 76.0 %    Lymphocyte % 5.2 (L) 19.6 - 45.3 %    Monocyte % 10.6 5.0 - 12.0 %    Eosinophil % 0.3 0.3 - 6.2 %    Basophil % 0.1 0.0 - 1.5 %    Immature Grans % 0.3 0.0 - 0.5 %    Neutrophils, Absolute 5.60 1.70 - 7.00 10*3/mm3    Lymphocytes, Absolute 0.35 (L) 0.70 - 3.10 10*3/mm3    Monocytes, Absolute 0.71 0.10 - 0.90 10*3/mm3    Eosinophils, Absolute 0.02 0.00 - 0.40 10*3/mm3    Basophils, Absolute 0.01 0.00 - 0.20 10*3/mm3    Immature Grans, Absolute 0.02 0.00 - 0.05 10*3/mm3    nRBC 0.0 0.0 - 0.2 /100 WBC   Respiratory Panel PCR w/COVID-19(SARS-CoV-2) SHARMILA/ZOIE/JALEESA/PAD/COR/ZACHARY In-House, NP Swab in UTM/VTM, 2 HR TAT - Swab, Nasopharynx    Collection Time: 01/16/25  8:33 PM    Specimen: Nasopharynx; Swab   Result Value Ref Range    ADENOVIRUS, PCR Not Detected Not Detected    Coronavirus 229E Not Detected Not Detected    Coronavirus HKU1 Not Detected Not Detected    Coronavirus NL63 Not Detected Not Detected    Coronavirus OC43 Not Detected Not Detected    COVID19 Not Detected Not Detected - Ref. Range    Human Metapneumovirus Not Detected Not Detected    Human Rhinovirus/Enterovirus Not Detected Not Detected    Influenza A H3 Detected (A) Not Detected    Influenza B PCR Not Detected Not Detected    Parainfluenza Virus 1 Not Detected Not Detected    Parainfluenza Virus 2 Not Detected Not Detected    Parainfluenza Virus 3 Not Detected Not Detected    Parainfluenza Virus 4 Not Detected Not Detected    RSV, PCR Not Detected Not Detected    Bordetella pertussis pcr Not Detected Not Detected    Bordetella parapertussis PCR Not Detected Not Detected    Chlamydophila pneumoniae PCR Not Detected Not Detected    Mycoplasma pneumo by PCR Not Detected Not Detected   Urinalysis With  Microscopic If Indicated (No Culture) - Urine, Clean Catch    Collection Time: 01/16/25  8:50 PM    Specimen: Urine, Clean Catch   Result Value Ref Range    Color, UA Yellow Yellow, Straw    Appearance, UA Cloudy (A) Clear    pH, UA 6.0 5.0 - 8.0    Specific Gravity, UA >1.030 (H) 1.005 - 1.030    Glucose, UA Negative Negative    Ketones, UA 80 mg/dL (3+) (A) Negative    Bilirubin, UA Negative Negative    Blood, UA Negative Negative    Protein, UA Trace (A) Negative    Leuk Esterase, UA Trace (A) Negative    Nitrite, UA Negative Negative    Urobilinogen, UA 1.0 E.U./dL 0.2 - 1.0 E.U./dL   Urinalysis, Microscopic Only - Urine, Clean Catch    Collection Time: 01/16/25  8:50 PM    Specimen: Urine, Clean Catch   Result Value Ref Range    RBC, UA 0-2 None Seen, 0-2 /HPF    WBC, UA 21-50 (A) None Seen, 0-2 /HPF    Bacteria, UA 4+ (A) None Seen /HPF    Squamous Epithelial Cells, UA 13-20 (A) None Seen, 0-2 /HPF    Hyaline Casts, UA None Seen None Seen /LPF    Methodology Automated Microscopy    Lactic Acid, Plasma    Collection Time: 01/16/25 10:20 PM    Specimen: Blood   Result Value Ref Range    Lactate 0.7 0.5 - 2.0 mmol/L       I ordered the above labs and independently reviewed the results.        RADIOLOGY  CT Abdomen Pelvis Without Contrast    Result Date: 1/16/2025  CT ABDOMEN AND PELVIS WITHOUT IV CONTRAST  HISTORY: 29-year-old female with right flank pain.  TECHNIQUE: Radiation dose reduction techniques were utilized, including automated exposure control and exposure modulation based on body size. 3 mm images were obtained through the abdomen and pelvis without the administration of IV contrast. No priors for comparison.  FINDINGS: 1. There are no renal or ureteral stones and there is no hydronephrosis bilaterally. No stones within the urinary bladder and the bladder is nearly collapsed. Noncontrasted uterus and adnexa appear unremarkable. The largest follicles on the right measuring 1.3 cm. There is no free  fluid.  2. Noncontrasted liver, gallbladder, spleen, pancreas, and adrenals appear unremarkable. There is no acute bowel abnormality. There is no colitis or appendicitis. There is a very small umbilical hernia containing fat.  3. No evidence for pneumonia or pleural effusions at the visualized lung bases.        I ordered the above noted radiological studies. Reviewed by me and discussed with radiologist.  See dictation for official radiology interpretation.      PROCEDURES    Procedures      MEDICATIONS GIVEN IN ER    Medications   sodium chloride 0.9 % flush 10 mL (has no administration in time range)   sodium chloride 0.9 % bolus 1,000 mL (0 mL Intravenous Stopped 1/17/25 0036)   acetaminophen (TYLENOL) tablet 1,000 mg (1,000 mg Oral Given 1/16/25 2034)   ondansetron (ZOFRAN) injection 4 mg (4 mg Intravenous Given 1/16/25 2033)   cefTRIAXone (ROCEPHIN) 2,000 mg in sodium chloride 0.9 % 100 mL MBP (0 mg Intravenous Stopped 1/16/25 2323)   sodium chloride 0.9 % bolus 1,000 mL (0 mL Intravenous Stopped 1/17/25 0027)         ORDERS PLACED DURING THIS VISIT:  Orders Placed This Encounter   Procedures    Respiratory Panel PCR w/COVID-19(SARS-CoV-2) SHARMILA/ZOIE/JALEESA/PAD/COR/ZACHARY In-House, NP Swab in UTM/VTM, 2 HR TAT - Swab, Nasopharynx    Urine Culture - Urine,    CT Abdomen Pelvis Without Contrast    [place] Draw    Comprehensive Metabolic Panel    Lipase    Urinalysis With Microscopic If Indicated (No Culture) - Urine, Clean Catch    hCG, Serum, Qualitative    CBC Auto Differential    Urinalysis, Microscopic Only - Urine, Clean Catch    Lactic Acid, Plasma    NPO Diet NPO Type: Strict NPO    Undress & Gown    Monitor Blood Pressure    Continuous Pulse Oximetry    Po challenge  Misc Nursing Order (Specify)    Insert Peripheral IV    CBC & Differential    Green Top (Gel)    Lavender Top    Light Blue Top         PROGRESS, DATA ANALYSIS, CONSULTS, AND MEDICAL DECISION MAKING    All labs have been independently interpreted by  me.  All radiology studies have been reviewed by me and discussed with radiologist dictating the report.   EKG's independently viewed and interpreted by me.  Discussion below represents my analysis of pertinent findings related to patient's condition, differential diagnosis, treatment plan and final disposition.    Differential diagnosis includes but is not limited to sepsis, UTI, pyelonephritis, URI    Clinical Scores:              ED Course as of 01/17/25 0042   Thu Jan 16, 2025 2018 HCG Qualitative: Negative [TJ]   2018 Lipase: 26 [TJ]   2018 WBC: 6.71 [TJ]   2018 Hemoglobin: 14.2 [TJ]   2018 Platelets: 251 [TJ]   2018 Creatinine: 0.61 [TJ]   2018 Potassium: 3.8 [TJ]   2126 WBC, UA(!): 21-50 [TJ]   2322 Reassessment: Updated patient regarding diagnostic results.  Patient is still feeling fairly dry.  We will bolus her an additional liter of fluid and give her p.o. challenge.  Patient was treated with Rocephin and Zofran. [TJ]   Fri Jan 17, 2025   0018 Patient is feeling better.  Tolerating p.o.  Will discharge home. [TJ]      ED Course User Index  [TJ] Atilio Peñaloza MD               PPE: The patient wore a mask and I wore an N95 mask throughout the entire patient encounter.       AS OF 00:42 EST VITALS:    BP - 137/80  HR - 87  TEMP - 98.4 °F (36.9 °C) (Oral)  O2 SATS - 100%        DIAGNOSIS  Final diagnoses:   Urinary tract infection without hematuria, site unspecified   Influenza A         DISPOSITION  ED Disposition       ED Disposition   Discharge    Condition   Stable    Comment   --                  Note Disclaimer: At [facility], we believe that sharing information builds trust and better relationships. You are receiving this note because you recently visited [facility]. It is possible you will see health information before a provider has talked with you about it. This kind of information can be easy to misunderstand. To help you fully understand what it means for your health, we urge  you to discuss this note with your provider.         Atilio Peñaloza MD  01/17/25 0042

## 2025-01-18 LAB — BACTERIA SPEC AEROBE CULT: NORMAL

## 2025-02-18 ENCOUNTER — OFFICE VISIT (OUTPATIENT)
Dept: FAMILY MEDICINE CLINIC | Facility: CLINIC | Age: 30
End: 2025-02-18
Payer: COMMERCIAL

## 2025-02-18 VITALS
BODY MASS INDEX: 41.11 KG/M2 | OXYGEN SATURATION: 97 % | WEIGHT: 232 LBS | HEART RATE: 91 BPM | TEMPERATURE: 96.6 F | DIASTOLIC BLOOD PRESSURE: 70 MMHG | SYSTOLIC BLOOD PRESSURE: 126 MMHG | HEIGHT: 63 IN

## 2025-02-18 DIAGNOSIS — D68.51 FACTOR V LEIDEN CARRIER: ICD-10-CM

## 2025-02-18 DIAGNOSIS — Z09 HOSPITAL DISCHARGE FOLLOW-UP: Primary | ICD-10-CM

## 2025-02-18 DIAGNOSIS — G43.009 MIGRAINE WITHOUT AURA AND WITHOUT STATUS MIGRAINOSUS, NOT INTRACTABLE: ICD-10-CM

## 2025-02-18 PROCEDURE — 99214 OFFICE O/P EST MOD 30 MIN: CPT | Performed by: NURSE PRACTITIONER

## 2025-02-18 NOTE — ASSESSMENT & PLAN NOTE
Not currently on any blood thinners only when pregnant  Orders:    Comprehensive metabolic panel    Lipid panel    CBC and Differential    TSH    T4, Free

## 2025-02-18 NOTE — PROGRESS NOTES
Chief Complaint  Establish Care and Transitional Care Management    Subjective        HPI   Ines Padron presents to [facility] as a 29 year old female to establish care and to follow up after being seen in the ED and UC over the past month.      Note from ED visit   HPI:  Chief Complaint: Fever, low back pain     A complete HPI/ROS/PMH/PSH/SH/FH are unobtainable due to: None     Chronic or social conditions impacting patient care (Social Determinants of Health): None  (Financial Resource Strain / Food Insecurity / Transportation Needs / Physical Activity / Stress / Social Connections / Intimate Partner Violence / Housing Stability)     Context: Ines Padron is a 29 y.o. female who presents to the ED c/o acute fever and low back pain that started today while at work.  Sports of nausea vomiting.  Patient denies any shortness of breath no cough or significant congestion.  No numbness or tingling.  No chest pain.     Review of prior external notes (non-ED) -and- Review of prior external test results outside of this encounter: I reviewed patient's OB/GYN note from 9/20/2024     She did return to the UC on 2/6/2025 and was treated for strep throat and ongoing urinary symptoms/back pain  Note from that visit     Expand All Collapse All       Subjective     History provided by:  Patient   used: No    Urinary Tract Infection  Pain severity:  No pain  Recent urinary tract infections: yes    Ineffective treatments:  None tried  Urinary symptoms: no frequent urination    Associated symptoms: no abdominal pain, no fever, no flank pain, no nausea and no vomiting    Associated symptoms comment:  Low back pain only  Sore Throat  Associated symptoms: no abdominal pain, no ear pain and no fever    Pt experiencing ST since yesterday      ED Course        Medical Decision Making  Problems Addressed:  Acute UTI: complicated acute illness or injury  Sore throat:  "complicated acute illness or injury     Amount and/or Complexity of Data Reviewed  Labs: ordered.     Risk  Prescription drug management.           Final diagnoses:   Sore throat   Acute UTI        She does have a couple of days left on her Augmentin.  Overall she is doing much better.  She has no symptoms in office today    She has a history of DVT and factor V-not currently on any blood thinners  She has seasonal allergies and takes Zyrtec    History of migraines-has been well-controlled with over-the-counter medication.  She has about 3 to 4/month occasionally has some nausea sensitivity to light and sound  No increase or changes    She denies any other significant personal or family medical history    She is fasting and agreeable to labs today    No other acute complaints    The following portions of the patient's history were reviewed and updated as appropriate: allergies, current medications, past family history, past medical history, past social history, past surgical history, and problem list     Review of Systems   Constitutional:  Negative for chills, fatigue and fever.   Eyes:  Negative for visual disturbance.   Respiratory:  Negative for cough, shortness of breath, wheezing and stridor.    Cardiovascular:  Negative for chest pain, palpitations and leg swelling.   Gastrointestinal:  Negative for abdominal pain, diarrhea, nausea and vomiting.   Endocrine: Negative for polydipsia, polyphagia and polyuria.   Skin:  Negative for rash.   Neurological:  Negative for dizziness.   Hematological:  Negative for adenopathy. Does not bruise/bleed easily.   Psychiatric/Behavioral:  Negative for self-injury, sleep disturbance and suicidal ideas. The patient is not nervous/anxious.         Objective   Vital Signs:   Vitals:    02/18/25 1107   BP: 126/70   Pulse: 91   Temp: 96.6 °F (35.9 °C)   SpO2: 97%   Weight: 105 kg (232 lb)   Height: 160 cm (62.99\")   PainSc: 0-No pain            2/18/2025    11:09 AM   PHQ-2/PHQ-9 " Depression Screening   Little interest or pleasure in doing things Not at all   Feeling down, depressed, or hopeless Not at all   How difficult have these problems made it for you to do your work, take care of things at home, or get along with other people? Not difficult at all               Physical Exam  Vitals reviewed.   Constitutional:       General: She is not in acute distress.  HENT:      Nose: No congestion.      Mouth/Throat:      Mouth: Mucous membranes are moist.      Pharynx: Oropharynx is clear. No oropharyngeal exudate or posterior oropharyngeal erythema.   Eyes:      Conjunctiva/sclera: Conjunctivae normal.   Neck:      Thyroid: No thyromegaly.      Vascular: No carotid bruit.   Cardiovascular:      Rate and Rhythm: Normal rate and regular rhythm.      Heart sounds: Normal heart sounds. Murmur heard.   Pulmonary:      Effort: Pulmonary effort is normal. No respiratory distress.      Breath sounds: Normal breath sounds. No stridor. No wheezing, rhonchi or rales.   Chest:      Chest wall: No tenderness.   Abdominal:      Tenderness: There is no right CVA tenderness or left CVA tenderness.   Lymphadenopathy:      Cervical: No cervical adenopathy.   Neurological:      Mental Status: She is alert and oriented to person, place, and time.   Psychiatric:         Attention and Perception: Attention normal.         Mood and Affect: Mood normal.          Result Review :     The following data was reviewed by: MARYAN Saul on 02/18/2025:           Assessment and Plan       Hospital discharge follow-up  [facility] ED 1/16/2025-influenza A/UTI    Urgent care 2/6/2025-strep throat/UTI    Overall doing well  Orders:    Comprehensive metabolic panel    Lipid panel    CBC and Differential    TSH    T4, Free    Factor V Leiden carrier  Not currently on any blood thinners only when pregnant  Orders:    Comprehensive metabolic panel    Lipid panel    CBC and Differential    TSH    T4,  Free    Migraine without aura and without status migrainosus, not intractable  Headaches are stable.    Plan:  Continue same medication/s without change.     Discussed medication dosage, use, side effects, and goals of treatment in detail.    Discussed monitoring symptoms and use of quick-relief medications and maintenance medication.    General Treatment Goals:   symptom prevention  minimize work absence  minimizing limitation in activity  prevention of exacerbations  decrease use of ER/inpatient care  minimization of adverse effects of treatment    Followup in 6 months        Orders:    Comprehensive metabolic panel    Lipid panel    CBC and Differential    TSH    T4, Free          Follow Up   Return in about 6 months (around 8/18/2025), or if symptoms worsen or fail to improve, for Annual physical.  Patient was given instructions and counseling regarding her condition or for health maintenance advice. Please see specific information pulled into the AVS if appropriate.

## 2025-02-19 ENCOUNTER — PATIENT ROUNDING (BHMG ONLY) (OUTPATIENT)
Dept: FAMILY MEDICINE CLINIC | Facility: CLINIC | Age: 30
End: 2025-02-19
Payer: COMMERCIAL

## 2025-02-19 LAB
ALBUMIN SERPL-MCNC: 4.1 G/DL (ref 3.5–5.2)
ALBUMIN/GLOB SERPL: 1.2 G/DL
ALP SERPL-CCNC: 62 U/L (ref 39–117)
ALT SERPL-CCNC: 15 U/L (ref 1–33)
AST SERPL-CCNC: 14 U/L (ref 1–32)
BASOPHILS # BLD AUTO: 0.05 10*3/MM3 (ref 0–0.2)
BASOPHILS NFR BLD AUTO: 0.7 % (ref 0–1.5)
BILIRUB SERPL-MCNC: 0.2 MG/DL (ref 0–1.2)
BUN SERPL-MCNC: 12 MG/DL (ref 6–20)
BUN/CREAT SERPL: 18.2 (ref 7–25)
CALCIUM SERPL-MCNC: 9.9 MG/DL (ref 8.6–10.5)
CHLORIDE SERPL-SCNC: 99 MMOL/L (ref 98–107)
CHOLEST SERPL-MCNC: 214 MG/DL (ref 0–200)
CO2 SERPL-SCNC: 26.4 MMOL/L (ref 22–29)
CREAT SERPL-MCNC: 0.66 MG/DL (ref 0.57–1)
EGFRCR SERPLBLD CKD-EPI 2021: 120.4 ML/MIN/1.73
EOSINOPHIL # BLD AUTO: 0.26 10*3/MM3 (ref 0–0.4)
EOSINOPHIL NFR BLD AUTO: 3.4 % (ref 0.3–6.2)
ERYTHROCYTE [DISTWIDTH] IN BLOOD BY AUTOMATED COUNT: 12.1 % (ref 12.3–15.4)
GLOBULIN SER CALC-MCNC: 3.3 GM/DL
GLUCOSE SERPL-MCNC: 81 MG/DL (ref 65–99)
HCT VFR BLD AUTO: 40.5 % (ref 34–46.6)
HDLC SERPL-MCNC: 47 MG/DL (ref 40–60)
HGB BLD-MCNC: 13.1 G/DL (ref 12–15.9)
IMM GRANULOCYTES # BLD AUTO: 0.03 10*3/MM3 (ref 0–0.05)
IMM GRANULOCYTES NFR BLD AUTO: 0.4 % (ref 0–0.5)
LDLC SERPL CALC-MCNC: 153 MG/DL (ref 0–100)
LYMPHOCYTES # BLD AUTO: 1.71 10*3/MM3 (ref 0.7–3.1)
LYMPHOCYTES NFR BLD AUTO: 22.3 % (ref 19.6–45.3)
MCH RBC QN AUTO: 30 PG (ref 26.6–33)
MCHC RBC AUTO-ENTMCNC: 32.3 G/DL (ref 31.5–35.7)
MCV RBC AUTO: 92.7 FL (ref 79–97)
MONOCYTES # BLD AUTO: 0.43 10*3/MM3 (ref 0.1–0.9)
MONOCYTES NFR BLD AUTO: 5.6 % (ref 5–12)
NEUTROPHILS # BLD AUTO: 5.2 10*3/MM3 (ref 1.7–7)
NEUTROPHILS NFR BLD AUTO: 67.6 % (ref 42.7–76)
NRBC BLD AUTO-RTO: 0 /100 WBC (ref 0–0.2)
PLATELET # BLD AUTO: 350 10*3/MM3 (ref 140–450)
POTASSIUM SERPL-SCNC: 4.3 MMOL/L (ref 3.5–5.2)
PROT SERPL-MCNC: 7.4 G/DL (ref 6–8.5)
RBC # BLD AUTO: 4.37 10*6/MM3 (ref 3.77–5.28)
SODIUM SERPL-SCNC: 136 MMOL/L (ref 136–145)
T4 FREE SERPL-MCNC: 1.19 NG/DL (ref 0.92–1.68)
TRIGL SERPL-MCNC: 81 MG/DL (ref 0–150)
TSH SERPL DL<=0.005 MIU/L-ACNC: 1.24 UIU/ML (ref 0.27–4.2)
VLDLC SERPL CALC-MCNC: 14 MG/DL (ref 5–40)
WBC # BLD AUTO: 7.68 10*3/MM3 (ref 3.4–10.8)

## 2025-02-19 NOTE — PROGRESS NOTES
A My-Chart message has been sent to the patient for PATIENT ROUNDING with [facility]

## 2025-03-20 PROBLEM — Z86.32 HISTORY OF GESTATIONAL DIABETES: Status: ACTIVE | Noted: 2025-03-20

## 2025-03-20 NOTE — PROGRESS NOTES
"Chief Complaint   Patient presents with    Possible Pregnancy     Pregnancy Confirmation With U/S Today. LMP - 02/08/2025  Patient has no concerns.         HPI  Ines Padron is a 29 y.o. female is scheduled for f/u visit with viability u/s. She notes she believes she ovulated later than expected for her LMP. She believes ovulation was on March 4. She took pregnancy tests when she would have been due for menses by LMP and they were negative. She notes she then took a pregnancy test last Friday and it was faintly positive. She notes the test has become more clearly positive over this week. She denies pelvic pain or vaginal bleeding.        The following portions of the patient's history were reviewed and updated as appropriate: allergies, current medications, past family history, past medical history, past social history, past surgical history, and problem list.    Review of Systems  Pertinent items are noted in HPI.    /86 (BP Location: Left arm, Patient Position: Sitting, Cuff Size: Adult)   Ht 159.8 cm (62.9\")   Wt 101 kg (221 lb 9.6 oz)   LMP 02/08/2025   BMI 39.38 kg/m²         Physical Exam  Constitutional:       Appearance: Normal appearance.   Pulmonary:      Effort: Pulmonary effort is normal.   Neurological:      General: No focal deficit present.      Mental Status: She is alert and oriented to person, place, and time.   Psychiatric:         Mood and Affect: Mood normal.         Behavior: Behavior normal.         Tv u/s: No intrauterine gestational sac noted, endometrium prominent at 25 mm. Normal ovaries. No free fluid or adnexal masses noted.       Diagnoses and all orders for this visit:    1. Screening for hematuria or proteinuria (Primary)    2. Pregnancy with uncertain fetal viability, single or unspecified fetus  -     Urine Culture - Urine, Urine, Clean Catch  -     HCG, Quantitative, Pregnancy (Hospital Lab Only); Future  -     Chlamydia trachomatis, Neisseria gonorrhoeae, " Trichomonas vaginalis, PCR - Urine, Urine, Clean Catch    3. History of DVT (deep vein thrombosis)    4. Factor V Leiden carrier    5. Pregnancy of unknown anatomic location      Urine HCG obtained and faintly positive. Discussed u/s does not confirm IUP. Recommend quant HCG today and will need to trend. Reviewed ectopic and SAB warnings however pt hx, suspected ovulation and home urine HCG tests support late ovulation. Will obtain Quant [facility]CG at [facility] today and repeat in 3 days ( Monday ) with f/u visit.     Discussed hx DVT and factor V leiden: recommend prophylactic dose lovenox but recommend hold if vag bleeding or abdominal pain as viable pregnancy not yet confirmed. Pt notes understanding.

## 2025-03-21 ENCOUNTER — OFFICE VISIT (OUTPATIENT)
Dept: OBSTETRICS AND GYNECOLOGY | Age: 30
End: 2025-03-21
Payer: COMMERCIAL

## 2025-03-21 ENCOUNTER — RESULTS FOLLOW-UP (OUTPATIENT)
Dept: OBSTETRICS AND GYNECOLOGY | Age: 30
End: 2025-03-21

## 2025-03-21 ENCOUNTER — LAB (OUTPATIENT)
Dept: LAB | Facility: HOSPITAL | Age: 30
End: 2025-03-21
Payer: COMMERCIAL

## 2025-03-21 VITALS
SYSTOLIC BLOOD PRESSURE: 124 MMHG | BODY MASS INDEX: 39.27 KG/M2 | HEIGHT: 63 IN | DIASTOLIC BLOOD PRESSURE: 86 MMHG | WEIGHT: 221.6 LBS

## 2025-03-21 DIAGNOSIS — O36.80X0 PREGNANCY WITH UNCERTAIN FETAL VIABILITY, SINGLE OR UNSPECIFIED FETUS: ICD-10-CM

## 2025-03-21 DIAGNOSIS — Z86.718 HISTORY OF DVT (DEEP VEIN THROMBOSIS): ICD-10-CM

## 2025-03-21 DIAGNOSIS — O36.80X0 PREGNANCY OF UNKNOWN ANATOMIC LOCATION: ICD-10-CM

## 2025-03-21 DIAGNOSIS — D68.51 FACTOR V LEIDEN CARRIER: ICD-10-CM

## 2025-03-21 DIAGNOSIS — O36.80X0 PREGNANCY OF UNKNOWN ANATOMIC LOCATION: Primary | ICD-10-CM

## 2025-03-21 DIAGNOSIS — Z13.89 SCREENING FOR HEMATURIA OR PROTEINURIA: Primary | ICD-10-CM

## 2025-03-21 LAB
BILIRUB BLD-MCNC: NEGATIVE MG/DL
CLARITY, POC: CLEAR
COLOR UR: YELLOW
GLUCOSE UR STRIP-MCNC: NEGATIVE MG/DL
HCG INTACT+B SERPL-ACNC: 415 MIU/ML
KETONES UR QL: NEGATIVE
LEUKOCYTE EST, POC: NEGATIVE
NITRITE UR-MCNC: NEGATIVE MG/ML
PH UR: 6 [PH] (ref 5–8)
PROT UR STRIP-MCNC: NEGATIVE MG/DL
RBC # UR STRIP: NEGATIVE /UL
SP GR UR: 1.02 (ref 1–1.03)
UROBILINOGEN UR QL: NORMAL

## 2025-03-21 PROCEDURE — 36415 COLL VENOUS BLD VENIPUNCTURE: CPT

## 2025-03-21 PROCEDURE — 84702 CHORIONIC GONADOTROPIN TEST: CPT

## 2025-03-21 RX ORDER — CHOLECALCIFEROL (VITAMIN D3) 50 MCG
1 TABLET ORAL DAILY
Qty: 30 EACH | Refills: 11 | Status: SHIPPED | OUTPATIENT
Start: 2025-03-21

## 2025-03-21 RX ORDER — ENOXAPARIN SODIUM 100 MG/ML
40 INJECTION SUBCUTANEOUS
Qty: 12 ML | Refills: 9 | Status: SHIPPED | OUTPATIENT
Start: 2025-03-21 | End: 2025-04-20

## 2025-03-21 NOTE — TELEPHONE ENCOUNTER
Called Ines and reviewed HCG is only 415. Recommend repeat level Monday AM but hold on f/u u/s as will not likely see IUP yet. Pt notes understanding. Will repeat u/s later next week. Reviewed ectopic and SAB warnings.

## 2025-03-23 LAB
BACTERIA UR CULT: NORMAL
BACTERIA UR CULT: NORMAL

## 2025-03-24 ENCOUNTER — TELEPHONE (OUTPATIENT)
Dept: OBSTETRICS AND GYNECOLOGY | Age: 30
End: 2025-03-24

## 2025-03-24 ENCOUNTER — LAB (OUTPATIENT)
Dept: LAB | Facility: HOSPITAL | Age: 30
End: 2025-03-24
Payer: COMMERCIAL

## 2025-03-24 DIAGNOSIS — O36.80X0 PREGNANCY OF UNKNOWN ANATOMIC LOCATION: ICD-10-CM

## 2025-03-24 LAB
C TRACH RRNA SPEC QL NAA+PROBE: NEGATIVE
HCG INTACT+B SERPL-ACNC: 1624 MIU/ML
N GONORRHOEA RRNA SPEC QL NAA+PROBE: NEGATIVE
T VAGINALIS RRNA SPEC QL NAA+PROBE: NEGATIVE

## 2025-03-24 PROCEDURE — 36415 COLL VENOUS BLD VENIPUNCTURE: CPT

## 2025-03-24 PROCEDURE — 84702 CHORIONIC GONADOTROPIN TEST: CPT

## 2025-03-27 ENCOUNTER — OFFICE VISIT (OUTPATIENT)
Dept: OBSTETRICS AND GYNECOLOGY | Age: 30
End: 2025-03-27
Payer: COMMERCIAL

## 2025-03-27 VITALS — DIASTOLIC BLOOD PRESSURE: 82 MMHG | WEIGHT: 223.8 LBS | SYSTOLIC BLOOD PRESSURE: 120 MMHG | BODY MASS INDEX: 39.77 KG/M2

## 2025-03-27 DIAGNOSIS — D68.51 FACTOR V LEIDEN CARRIER: ICD-10-CM

## 2025-03-27 DIAGNOSIS — O36.80X0 PREGNANCY, LOCATION UNKNOWN: Primary | ICD-10-CM

## 2025-03-27 DIAGNOSIS — Z86.718 HISTORY OF DVT (DEEP VEIN THROMBOSIS): ICD-10-CM

## 2025-03-27 NOTE — PROGRESS NOTES
No chief complaint on file.       HPI  Ines Padron is a 29 y.o. female is scheduled for f/u visit with u/s. Serial HCG levels showed appropriate rise. She denies pelvic pain or vaginal bleeding.        The following portions of the patient's history were reviewed and updated as appropriate: allergies, current medications, past family history, past medical history, past social history, past surgical history, and problem list.    Review of Systems  Pertinent items are noted in HPI.    /82 (BP Location: Left arm, Patient Position: Sitting, Cuff Size: Adult)   Wt 102 kg (223 lb 12.8 oz)   LMP 02/08/2025   BMI 39.77 kg/m²         Physical Exam  Constitutional:       Appearance: Normal appearance.   Pulmonary:      Effort: Pulmonary effort is normal.   Neurological:      Mental Status: She is alert.   Psychiatric:         Mood and Affect: Mood normal.         Behavior: Behavior normal.     Tv u/s: intrauterine fluid collection with double sac sign suggestive for gestational sac        Diagnoses and all orders for this visit:    1. Pregnancy, location unknown (Primary)    2. Factor V Leiden carrier    3. History of DVT (deep vein thrombosis)      U/S today with probable intrauterine gestational sac. Plan f/u u/s 2 weeks or prn     Factor V leiden/hx DVT: pt started lovenox

## 2025-04-15 ENCOUNTER — OFFICE VISIT (OUTPATIENT)
Dept: OBSTETRICS AND GYNECOLOGY | Age: 30
End: 2025-04-15
Payer: COMMERCIAL

## 2025-04-15 VITALS — WEIGHT: 224.6 LBS | DIASTOLIC BLOOD PRESSURE: 72 MMHG | SYSTOLIC BLOOD PRESSURE: 118 MMHG | BODY MASS INDEX: 39.91 KG/M2

## 2025-04-15 DIAGNOSIS — R63.8 INCREASED BMI: ICD-10-CM

## 2025-04-15 DIAGNOSIS — D68.51 FACTOR V LEIDEN CARRIER: ICD-10-CM

## 2025-04-15 DIAGNOSIS — Z98.891 HISTORY OF CESAREAN DELIVERY: ICD-10-CM

## 2025-04-15 DIAGNOSIS — Z3A.08 8 WEEKS GESTATION OF PREGNANCY: ICD-10-CM

## 2025-04-15 DIAGNOSIS — Z13.89 SCREENING FOR HEMATURIA OR PROTEINURIA: Primary | ICD-10-CM

## 2025-04-15 DIAGNOSIS — O36.80X0 PREGNANCY WITH UNCERTAIN FETAL VIABILITY, SINGLE OR UNSPECIFIED FETUS: ICD-10-CM

## 2025-04-15 DIAGNOSIS — Z86.32 HISTORY OF GESTATIONAL DIABETES: ICD-10-CM

## 2025-04-15 RX ORDER — ENOXAPARIN SODIUM 100 MG/ML
40 INJECTION SUBCUTANEOUS
Qty: 12 ML | Refills: 9 | Status: SHIPPED | OUTPATIENT
Start: 2025-04-15 | End: 2025-05-15

## 2025-04-15 RX ORDER — ASPIRIN 81 MG/1
81 TABLET, CHEWABLE ORAL DAILY
Qty: 30 TABLET | Refills: 8 | Status: SHIPPED | OUTPATIENT
Start: 2025-04-15

## 2025-04-15 NOTE — PROGRESS NOTES
Chief Complaint   Patient presents with    Possible Pregnancy     Patient here for a Follow Up Today. Patient has no concerns.         HPI  Ines Padron is a 29 y.o. female is scheduled for f/u visit with u/s to determine fetal viability. She denies pain/bleeding.         The following portions of the patient's history were reviewed and updated as appropriate: allergies, current medications, past family history, past medical history, past social history, past surgical history, and problem list.    Review of Systems  Pertinent items are noted in HPI.    /72 (BP Location: Left arm, Patient Position: Sitting, Cuff Size: Adult)   Wt 102 kg (224 lb 9.6 oz)   LMP 2025   BMI 39.91 kg/m²         Physical Exam  Constitutional:       Appearance: Normal appearance.   Pulmonary:      Effort: Pulmonary effort is normal.   Neurological:      General: No focal deficit present.      Mental Status: She is alert and oriented to person, place, and time.   Psychiatric:         Mood and Affect: Mood normal.         Behavior: Behavior normal.     Tv u/s: viable IUP, 8 weeks        Diagnoses and all orders for this visit:    1. Screening for hematuria or proteinuria (Primary)  -     POC Urinalysis Dipstick    2. Pregnancy with uncertain fetal viability, single or unspecified fetus  -     POC Urinalysis Dipstick    3. Factor V Leiden carrier    4. 8 weeks gestation of pregnancy  -     OB Panel With HIV and RPR  -     Hemoglobin A1c  -     Hemoglobinopathy Fractionation Cascade    5. History of  delivery    6. History of gestational diabetes    7. Increased BMI    Other orders  -     enoxaparin sodium (LOVENOX) 40 MG/0.4ML solution prefilled syringe syringe; Inject 0.4 mL under the skin into the appropriate area as directed Daily for 30 days.  Dispense: 12 mL; Refill: 9  -     aspirin 81 MG chewable tablet; Chew 1 tablet Daily.  Dispense: 30 tablet; Refill: 8    Prenatal guidelines were reviewed with patient  to include activity restrictions to avoid listeria, mercury and toxoplasmosis exposures. Group coverage reviewed with patient. Over the counter medication list provided and reviewed.  Pt declines aneuploidy or carrier testing. F/u 4 wks for OB intake.     Hx c/s: pt interested in . Discussed concerns regarding indication for delivery at 38 wks due to anticoagulation and hx GDM. Provided consent forms and discussed risks. Pt will consider.    Increased BMI: recommend baby asa 12+ wks.     Factor V leiden with hx DVT: continue lovenox 40 mg SC daily with plan to change to heparin near term.     8 wks: check OB labs today    Hx GDM: hgbA1c today, plan early one hour    32 min spent with pt reviewing history and providing counseling

## 2025-04-16 PROBLEM — O09.899 RUBELLA NON-IMMUNE STATUS, ANTEPARTUM: Status: ACTIVE | Noted: 2025-04-16

## 2025-04-16 PROBLEM — Z28.39 RUBELLA NON-IMMUNE STATUS, ANTEPARTUM: Status: ACTIVE | Noted: 2025-04-16

## 2025-04-16 LAB
ABO GROUP BLD: ABNORMAL
BASOPHILS # BLD AUTO: 0 X10E3/UL (ref 0–0.2)
BASOPHILS NFR BLD AUTO: 1 %
BLD GP AB SCN SERPL QL: NEGATIVE
EOSINOPHIL # BLD AUTO: 0.1 X10E3/UL (ref 0–0.4)
EOSINOPHIL NFR BLD AUTO: 2 %
ERYTHROCYTE [DISTWIDTH] IN BLOOD BY AUTOMATED COUNT: 12.9 % (ref 11.7–15.4)
HBA1C MFR BLD: 5.1 % (ref 4.8–5.6)
HBV SURFACE AG SERPL QL IA: NEGATIVE
HCT VFR BLD AUTO: 40.2 % (ref 34–46.6)
HCV AB SERPL QL IA: NON REACTIVE
HCV AB SERPL QL IA: NORMAL
HGB A MFR BLD ELPH: 97.2 % (ref 96.4–98.8)
HGB A2 MFR BLD ELPH: 2.8 % (ref 1.8–3.2)
HGB BLD-MCNC: 13.5 G/DL (ref 11.1–15.9)
HGB F MFR BLD ELPH: 0 % (ref 0–2)
HGB FRACT BLD-IMP: NORMAL
HGB S MFR BLD ELPH: 0 %
HIV 1+2 AB+HIV1 P24 AG SERPL QL IA: NON REACTIVE
IMM GRANULOCYTES # BLD AUTO: 0 X10E3/UL (ref 0–0.1)
IMM GRANULOCYTES NFR BLD AUTO: 0 %
LYMPHOCYTES # BLD AUTO: 1.5 X10E3/UL (ref 0.7–3.1)
LYMPHOCYTES NFR BLD AUTO: 27 %
MCH RBC QN AUTO: 31.5 PG (ref 26.6–33)
MCHC RBC AUTO-ENTMCNC: 33.6 G/DL (ref 31.5–35.7)
MCV RBC AUTO: 94 FL (ref 79–97)
MONOCYTES # BLD AUTO: 0.4 X10E3/UL (ref 0.1–0.9)
MONOCYTES NFR BLD AUTO: 7 %
NEUTROPHILS # BLD AUTO: 3.5 X10E3/UL (ref 1.4–7)
NEUTROPHILS NFR BLD AUTO: 63 %
PLATELET # BLD AUTO: 316 X10E3/UL (ref 150–450)
RBC # BLD AUTO: 4.28 X10E6/UL (ref 3.77–5.28)
RH BLD: POSITIVE
RPR SER QL: NON REACTIVE
RUBV IGG SERPL IA-ACNC: <0.9 INDEX
WBC # BLD AUTO: 5.5 X10E3/UL (ref 3.4–10.8)

## 2025-05-13 ENCOUNTER — INITIAL PRENATAL (OUTPATIENT)
Dept: OBSTETRICS AND GYNECOLOGY | Age: 30
End: 2025-05-13
Payer: COMMERCIAL

## 2025-05-13 VITALS — DIASTOLIC BLOOD PRESSURE: 70 MMHG | WEIGHT: 230 LBS | SYSTOLIC BLOOD PRESSURE: 118 MMHG | BODY MASS INDEX: 40.87 KG/M2

## 2025-05-13 DIAGNOSIS — Z86.718 HISTORY OF DVT (DEEP VEIN THROMBOSIS): Primary | ICD-10-CM

## 2025-05-13 DIAGNOSIS — Z3A.12 12 WEEKS GESTATION OF PREGNANCY: ICD-10-CM

## 2025-05-13 DIAGNOSIS — Z86.32 HISTORY OF GESTATIONAL DIABETES: ICD-10-CM

## 2025-05-13 DIAGNOSIS — D68.51 FACTOR V LEIDEN CARRIER: ICD-10-CM

## 2025-05-13 DIAGNOSIS — Z98.891 HISTORY OF CESAREAN DELIVERY: ICD-10-CM

## 2025-05-13 LAB
EXTERNAL NIPT: NORMAL
GLUCOSE UR STRIP-MCNC: NEGATIVE MG/DL
NTRA CYSTIC FIBROSIS: NORMAL
NTRA SPINAL MUSCULAR ATROPHY: NORMAL
PROT UR STRIP-MCNC: NEGATIVE MG/DL

## 2025-05-13 NOTE — PROGRESS NOTES
Chief Complaint   Patient presents with    Routine Prenatal Visit     New OB, 12 weeks, declined genetic testing  CC;  no problems        HPI  Ines Padron is a 29 y.o. female presents for scheduled visit. She has mild nausea but no other issues.        The following portions of the patient's history were reviewed and updated as appropriate: allergies, current medications, past family history, past medical history, past social history, past surgical history, and problem list.    Review of Systems  : neg for bleeding/pain    /70   Wt 104 kg (230 lb)   LMP 2025   BMI 40.87 kg/m²         Physical Exam  Constitutional:       Appearance: Normal appearance.   Neurological:      Mental Status: She is alert.   Psychiatric:         Mood and Affect: Mood normal.         Behavior: Behavior normal.       FH c/w 12 weeks  FHT's not heard, confirmed cardiac activity and fetal movement with bedside u/s  Ext: no edema or cords      Diagnoses and all orders for this visit:    1. History of DVT (deep vein thrombosis) (Primary)    2. Factor V Leiden carrier    3. History of gestational diabetes    4. History of  delivery    5. 12 weeks gestation of pregnancy  -     POC Urinalysis Dipstick      Hx DVT/factor V leiden: on lovenox daily    Hx GDM: early one hour next visit    Hx c/s: pt has considered risks/benefits and desires repeat c/s. She declines tubal.     12 weeks: pt declines aneuploidy or carrier screening

## 2025-05-21 ENCOUNTER — ROUTINE PRENATAL (OUTPATIENT)
Dept: OBSTETRICS AND GYNECOLOGY | Age: 30
End: 2025-05-21
Payer: COMMERCIAL

## 2025-05-21 VITALS — SYSTOLIC BLOOD PRESSURE: 124 MMHG | DIASTOLIC BLOOD PRESSURE: 74 MMHG | BODY MASS INDEX: 40.69 KG/M2 | WEIGHT: 229 LBS

## 2025-05-21 DIAGNOSIS — Z98.891 HISTORY OF CESAREAN DELIVERY: Primary | ICD-10-CM

## 2025-05-21 DIAGNOSIS — Z3A.13 13 WEEKS GESTATION OF PREGNANCY: ICD-10-CM

## 2025-05-21 DIAGNOSIS — O99.891 BACK PAIN AFFECTING PREGNANCY IN SECOND TRIMESTER: ICD-10-CM

## 2025-05-21 DIAGNOSIS — D68.51 FACTOR V LEIDEN CARRIER: ICD-10-CM

## 2025-05-21 DIAGNOSIS — M54.9 BACK PAIN AFFECTING PREGNANCY IN SECOND TRIMESTER: ICD-10-CM

## 2025-05-21 DIAGNOSIS — Z28.39 RUBELLA NON-IMMUNE STATUS, ANTEPARTUM: ICD-10-CM

## 2025-05-21 DIAGNOSIS — Z86.718 HISTORY OF DVT (DEEP VEIN THROMBOSIS): ICD-10-CM

## 2025-05-21 DIAGNOSIS — Z86.32 HISTORY OF GESTATIONAL DIABETES: ICD-10-CM

## 2025-05-21 DIAGNOSIS — O09.899 RUBELLA NON-IMMUNE STATUS, ANTEPARTUM: ICD-10-CM

## 2025-05-21 LAB
BILIRUB BLD-MCNC: NEGATIVE MG/DL
CLARITY, POC: CLEAR
COLOR UR: YELLOW
GLUCOSE UR STRIP-MCNC: NEGATIVE MG/DL
KETONES UR QL: ABNORMAL
LEUKOCYTE EST, POC: NEGATIVE
NITRITE UR-MCNC: NEGATIVE MG/ML
PH UR: 5.5 [PH] (ref 5–8)
PROT UR STRIP-MCNC: NEGATIVE MG/DL
RBC # UR STRIP: NEGATIVE /UL
SP GR UR: 1.03 (ref 1–1.03)
UROBILINOGEN UR QL: NORMAL

## 2025-05-21 RX ORDER — ENOXAPARIN SODIUM 100 MG/ML
INJECTION SUBCUTANEOUS
COMMUNITY
Start: 2025-05-15

## 2025-05-21 NOTE — PROGRESS NOTES
Chief Complaint   Patient presents with    Pregnancy Ultrasound     Ob u/s Low back pin,anxiety       HPI: 30 y.o.  at 13w1d gestation  She is experiencing LBP and lack of pregnancy symptoms  Has no other c/o  U/s has been done and was reassuring  She feels much better now  We did discuss back pain  I'll send urine culture and would recommend back stretches/exercises in the meantime  Call if sxs worsen  Plan f/u as scheduled on the     Vitals:    25 1444   BP: 124/74   Weight: 104 kg (229 lb)       ROS:  GI:  Negative  : na  Pulmonary: Negative     A/P  1. Intrauterine pregnancy at 13w1d   2. Pregnancy Risk:  HIGH RISK    Diagnoses and all orders for this visit:    1. History of  delivery (Primary)  -     POC Urinalysis Dipstick    2. 13 weeks gestation of pregnancy  -     POC Urinalysis Dipstick    3. Rubella non-immune status, antepartum  -     POC Urinalysis Dipstick    4. History of gestational diabetes  -     POC Urinalysis Dipstick    5. Factor V Leiden carrier  -     POC Urinalysis Dipstick    6. History of DVT (deep vein thrombosis)  -     POC Urinalysis Dipstick    7. Back pain affecting pregnancy in second trimester  -     Urine Culture - Urine, Urine, Random Void        -----------------------  PLAN:   Return for as scheduled on the .      GIANNI Valdivia  2025 14:57 EDT

## 2025-05-24 LAB
BACTERIA UR CULT: ABNORMAL
BACTERIA UR CULT: ABNORMAL
OTHER ANTIBIOTIC SUSC ISLT: ABNORMAL

## 2025-06-12 ENCOUNTER — ROUTINE PRENATAL (OUTPATIENT)
Dept: OBSTETRICS AND GYNECOLOGY | Age: 30
End: 2025-06-12
Payer: COMMERCIAL

## 2025-06-12 VITALS — DIASTOLIC BLOOD PRESSURE: 88 MMHG | BODY MASS INDEX: 41.23 KG/M2 | SYSTOLIC BLOOD PRESSURE: 116 MMHG | WEIGHT: 232 LBS

## 2025-06-12 DIAGNOSIS — Z28.39 RUBELLA NON-IMMUNE STATUS, ANTEPARTUM: ICD-10-CM

## 2025-06-12 DIAGNOSIS — Z13.1 SCREENING FOR DIABETES MELLITUS: Primary | ICD-10-CM

## 2025-06-12 DIAGNOSIS — Z36.1 ANTENATAL SCREENING FOR RAISED ALPHAFETOPROTEIN LEVEL: ICD-10-CM

## 2025-06-12 DIAGNOSIS — Z3A.16 16 WEEKS GESTATION OF PREGNANCY: ICD-10-CM

## 2025-06-12 DIAGNOSIS — D68.51 FACTOR V LEIDEN CARRIER: ICD-10-CM

## 2025-06-12 DIAGNOSIS — Z86.718 HISTORY OF DVT (DEEP VEIN THROMBOSIS): ICD-10-CM

## 2025-06-12 DIAGNOSIS — Z86.32 HISTORY OF GESTATIONAL DIABETES: ICD-10-CM

## 2025-06-12 DIAGNOSIS — Z13.89 SCREENING FOR HEMATURIA OR PROTEINURIA: ICD-10-CM

## 2025-06-12 DIAGNOSIS — Z98.891 HISTORY OF CESAREAN DELIVERY: ICD-10-CM

## 2025-06-12 DIAGNOSIS — O09.899 RUBELLA NON-IMMUNE STATUS, ANTEPARTUM: ICD-10-CM

## 2025-06-12 NOTE — PROGRESS NOTES
Chief Complaint   Patient presents with    Routine Prenatal Visit     16 Week's, 2 Day's With 1 Hour GTT Today. Patient has no concerns.         HPI  Ines Padron is a 30 y.o. female presents for ob visit. She denies any issues.         The following portions of the patient's history were reviewed and updated as appropriate: allergies, current medications, past family history, past medical history, past social history, past surgical history, and problem list.    Review of Systems  Gu: neg for bleeding/leaking fluid    /88   Wt 105 kg (232 lb)   LMP 2025   BMI 41.23 kg/m²         Physical Exam  Constitutional:       Appearance: Normal appearance.   Pulmonary:      Effort: Pulmonary effort is normal.   Neurological:      Mental Status: She is alert.   Psychiatric:         Mood and Affect: Mood normal.         Behavior: Behavior normal.       FH= 16 cm  FHT's 141 bpm  Ext: no cords or tenderness        Diagnoses and all orders for this visit:    1. Screening for diabetes mellitus (Primary)  -     Gestational Screen 1 Hr ([facility])    2. 16 weeks gestation of pregnancy  -     POC Urinalysis Dipstick  -     Alpha Fetoprotein, Maternal    3. Screening for hematuria or proteinuria  -     POC Urinalysis Dipstick    4.  screening for raised alphafetoprotein level  -     Alpha Fetoprotein, Maternal    5. History of DVT (deep vein thrombosis)    6. Factor V Leiden carrier    7. History of gestational diabetes    8. History of  delivery    9. Rubella non-immune status, antepartum      16 wks: afp today, u/s for anatomy at f/u    Hx GDM: early one hour today    Hx c/s: plans repeat    Hx DVT/factor V leiden: on lovenox

## 2025-06-14 LAB
AFP INTERP SERPL-IMP: NORMAL
AFP INTERP SERPL-IMP: NORMAL
AFP MOM SERPL: 0.83
AFP SERPL QL: NORMAL
AFP SERPL-MCNC: 21.9 NG/ML
AGE AT DELIVERY: 32.5 YR
GA METHOD: NORMAL
GA: 16.3 WEEKS
GLUCOSE 1H P 50 G GLC PO SERPL-MCNC: 103 MG/DL (ref 70–139)
IDDM PATIENT QL: NO
MULTIPLE PREGNANCY: NO
NEURAL TUBE DEFECT RISK FETUS: NORMAL %
SERVICE CMNT-IMP: NORMAL

## 2025-07-11 ENCOUNTER — ROUTINE PRENATAL (OUTPATIENT)
Dept: OBSTETRICS AND GYNECOLOGY | Age: 30
End: 2025-07-11
Payer: COMMERCIAL

## 2025-07-11 ENCOUNTER — PREP FOR SURGERY (OUTPATIENT)
Dept: OTHER | Facility: HOSPITAL | Age: 30
End: 2025-07-11
Payer: COMMERCIAL

## 2025-07-11 VITALS — DIASTOLIC BLOOD PRESSURE: 84 MMHG | WEIGHT: 234.8 LBS | BODY MASS INDEX: 41.73 KG/M2 | SYSTOLIC BLOOD PRESSURE: 118 MMHG

## 2025-07-11 DIAGNOSIS — Z79.01 CURRENT USE OF ANTICOAGULANT THERAPY: ICD-10-CM

## 2025-07-11 DIAGNOSIS — Z3A.20 20 WEEKS GESTATION OF PREGNANCY: Primary | ICD-10-CM

## 2025-07-11 DIAGNOSIS — Z86.718 HISTORY OF DVT (DEEP VEIN THROMBOSIS): ICD-10-CM

## 2025-07-11 DIAGNOSIS — O23.42 URINARY TRACT INFECTION IN MOTHER DURING SECOND TRIMESTER OF PREGNANCY: ICD-10-CM

## 2025-07-11 DIAGNOSIS — Z86.718 HISTORY OF DVT (DEEP VEIN THROMBOSIS): Primary | ICD-10-CM

## 2025-07-11 DIAGNOSIS — Z98.891 HISTORY OF CESAREAN DELIVERY: ICD-10-CM

## 2025-07-11 DIAGNOSIS — Z13.89 SCREENING FOR HEMATURIA OR PROTEINURIA: ICD-10-CM

## 2025-07-11 DIAGNOSIS — Z86.32 HISTORY OF GESTATIONAL DIABETES: ICD-10-CM

## 2025-07-11 DIAGNOSIS — Z87.440 HISTORY OF UTI: ICD-10-CM

## 2025-07-11 PROBLEM — O99.891 ASYMPTOMATIC BACTERIURIA DURING PREGNANCY: Status: ACTIVE | Noted: 2025-07-11

## 2025-07-11 PROBLEM — R82.71 ASYMPTOMATIC BACTERIURIA DURING PREGNANCY: Status: ACTIVE | Noted: 2025-07-11

## 2025-07-11 LAB
BILIRUB BLD-MCNC: NEGATIVE MG/DL
CLARITY, POC: ABNORMAL
COLOR UR: YELLOW
GLUCOSE UR STRIP-MCNC: NEGATIVE MG/DL
KETONES UR QL: NEGATIVE
LEUKOCYTE EST, POC: NEGATIVE
NITRITE UR-MCNC: NEGATIVE MG/ML
PH UR: 6 [PH] (ref 5–8)
PROT UR STRIP-MCNC: NEGATIVE MG/DL
RBC # UR STRIP: NEGATIVE /UL
SP GR UR: 1.03 (ref 1–1.03)
UROBILINOGEN UR QL: ABNORMAL

## 2025-07-11 NOTE — PROGRESS NOTES
Chief Complaint   Patient presents with    Routine Prenatal Visit     20 Week's, 3 Day's With Anatomy U/S Today. Patient has no concerns.         HPI  Ines Padron is a 30 y.o. female presents for scheduled visit. She is feeling fetal movement. She denies bleeding/ctx.         The following portions of the patient's history were reviewed and updated as appropriate: allergies, current medications, past family history, past medical history, past social history, past surgical history, and problem list.    Review of Systems  Pertinent items are noted in HPI.    /84   Wt 107 kg (234 lb 12.8 oz)   LMP 2025   BMI 41.73 kg/m²         Physical Exam  Constitutional:       Appearance: Normal appearance.   Pulmonary:      Effort: Pulmonary effort is normal.   Neurological:      Mental Status: She is alert.   Psychiatric:         Behavior: Behavior normal.       U/s: limited anatomic survey, viewed anatomy appears normal. Cervical length 4.4 cm. Placenta posterior.      Diagnoses and all orders for this visit:    1. 20 weeks gestation of pregnancy (Primary)  -     POC Urinalysis Dipstick    2. Screening for hematuria or proteinuria  -     POC Urinalysis Dipstick    3. History of DVT (deep vein thrombosis)  Comments:  on lovenox prophylaxis    4. History of gestational diabetes  Comments:  early one hour normal    5. History of  delivery    6. History of UTI  -     Urine Culture - Urine, Urine, Clean Catch    7. Urinary tract infection in mother during second trimester of pregnancy    8. Current use of anticoagulant therapy      20 wks: reviewed PTL and preeclamptic warnings. F/u anatomy 4 wks.     Hx c/s: pt desires repeat, plan at 38 wks due to anticoagulant use. Pt requests Friday and order placed.     Hx UTI: fernanda done

## 2025-07-13 LAB
BACTERIA UR CULT: NORMAL
BACTERIA UR CULT: NORMAL

## 2025-08-05 ENCOUNTER — ROUTINE PRENATAL (OUTPATIENT)
Dept: OBSTETRICS AND GYNECOLOGY | Age: 30
End: 2025-08-05
Payer: COMMERCIAL

## 2025-08-05 VITALS — WEIGHT: 239 LBS | BODY MASS INDEX: 42.47 KG/M2 | SYSTOLIC BLOOD PRESSURE: 118 MMHG | DIASTOLIC BLOOD PRESSURE: 84 MMHG

## 2025-08-05 DIAGNOSIS — D68.51 FACTOR V LEIDEN CARRIER: ICD-10-CM

## 2025-08-05 DIAGNOSIS — Z98.891 HISTORY OF CESAREAN DELIVERY: ICD-10-CM

## 2025-08-05 DIAGNOSIS — Z13.89 SCREENING FOR HEMATURIA OR PROTEINURIA: ICD-10-CM

## 2025-08-05 DIAGNOSIS — Z86.32 HISTORY OF GESTATIONAL DIABETES: ICD-10-CM

## 2025-08-05 DIAGNOSIS — Z3A.24 24 WEEKS GESTATION OF PREGNANCY: Primary | ICD-10-CM

## 2025-08-12 RX ORDER — ENOXAPARIN SODIUM 100 MG/ML
INJECTION SUBCUTANEOUS
OUTPATIENT
Start: 2025-08-12

## 2025-08-13 ENCOUNTER — TELEPHONE (OUTPATIENT)
Dept: OBSTETRICS AND GYNECOLOGY | Age: 30
End: 2025-08-13
Payer: COMMERCIAL

## 2025-08-13 RX ORDER — ENOXAPARIN SODIUM 100 MG/ML
40 INJECTION SUBCUTANEOUS
Qty: 12 ML | Refills: 8 | Status: SHIPPED | OUTPATIENT
Start: 2025-08-13

## 2025-08-13 RX ORDER — ASPIRIN 81 MG/1
81 TABLET, CHEWABLE ORAL DAILY
Qty: 30 TABLET | Refills: 8 | Status: SHIPPED | OUTPATIENT
Start: 2025-08-13

## (undated) DEVICE — GLV SURG BIOGEL LTX PF 6 1/2

## (undated) DEVICE — SOL IRR H2O BTL 1000ML STRL

## (undated) DEVICE — 3M(TM) TEGADERM(TM) TRANSPARENT FILM DRESSING FRAME STYLE 1627: Brand: 3M™ TEGADERM™

## (undated) DEVICE — ANTIBACTERIAL UNDYED BRAIDED (POLYGLACTIN 910), SYNTHETIC ABSORBABLE SUTURE: Brand: COATED VICRYL

## (undated) DEVICE — SUT MNCRYL PLS ANTIB UD 4/0 PS2 18IN